# Patient Record
Sex: MALE | Race: WHITE | Employment: OTHER | RURAL
[De-identification: names, ages, dates, MRNs, and addresses within clinical notes are randomized per-mention and may not be internally consistent; named-entity substitution may affect disease eponyms.]

---

## 2017-05-02 ENCOUNTER — OFFICE VISIT (OUTPATIENT)
Dept: FAMILY MEDICINE CLINIC | Age: 62
End: 2017-05-02

## 2017-05-02 VITALS
HEART RATE: 83 BPM | TEMPERATURE: 98.2 F | DIASTOLIC BLOOD PRESSURE: 80 MMHG | RESPIRATION RATE: 16 BRPM | WEIGHT: 227.4 LBS | SYSTOLIC BLOOD PRESSURE: 120 MMHG | BODY MASS INDEX: 32.56 KG/M2 | OXYGEN SATURATION: 97 % | HEIGHT: 70 IN

## 2017-05-02 DIAGNOSIS — I25.83 CORONARY ARTERY DISEASE DUE TO LIPID RICH PLAQUE: Primary | ICD-10-CM

## 2017-05-02 DIAGNOSIS — E78.49 FAMILIAL HYPERLIPIDEMIA, HIGH LDL: ICD-10-CM

## 2017-05-02 DIAGNOSIS — I25.10 CORONARY ARTERY DISEASE DUE TO LIPID RICH PLAQUE: Primary | ICD-10-CM

## 2017-05-02 DIAGNOSIS — I10 ESSENTIAL HYPERTENSION: ICD-10-CM

## 2017-05-02 DIAGNOSIS — E78.00 HYPERCHOLESTEROLEMIA: ICD-10-CM

## 2017-05-02 PROBLEM — I21.4 NON-ST ELEVATION (NSTEMI) MYOCARDIAL INFARCTION (HCC): Status: ACTIVE | Noted: 2017-05-02

## 2017-05-02 RX ORDER — TRIAMCINOLONE ACETONIDE 1 MG/G
CREAM TOPICAL
Qty: 30 G | Refills: 1 | Status: SHIPPED | OUTPATIENT
Start: 2017-05-02 | End: 2018-09-28 | Stop reason: SDUPTHER

## 2017-05-02 RX ORDER — EZETIMIBE 10 MG/1
10 TABLET ORAL DAILY
Qty: 30 TAB | Refills: 6 | Status: SHIPPED | OUTPATIENT
Start: 2017-05-02 | End: 2018-04-13

## 2017-05-02 RX ORDER — FLUVASTATIN 20 MG/1
20 CAPSULE ORAL
Qty: 15 CAP | Refills: 3 | Status: SHIPPED | OUTPATIENT
Start: 2017-05-02 | End: 2017-06-19 | Stop reason: SINTOL

## 2017-05-02 NOTE — PROGRESS NOTES
Garrick Parks is a 64 y.o. male presenting for/with:    Labs (Fasting) and Leg Pain      HPI:  Familial Hyperlipidemia and CHD  On zetia 10mg qd. Sergey well. No myalgias, arthralgias, unusual weakness. Has hx of failing pravastatin 40mg every day in past due to cramps, Prev tried lovastatin, lipitor, zocor, crestor, had bad leg cramps. Never tried lescol, livalo. Lab Results   Component Value Date/Time    Cholesterol, total 582 05/13/2015 08:01 AM    HDL Cholesterol 38 05/13/2015 08:01 AM    LDL, calculated Comment 05/13/2015 08:01 AM    VLDL, calculated Comment 05/13/2015 08:01 AM    Triglyceride 1688 05/13/2015 08:01 AM       Lab Results   Component Value Date/Time    ALT (SGPT) 24 05/13/2015 08:01 AM    AST (SGOT) 28 05/13/2015 08:01 AM    Alk. phosphatase 70 05/13/2015 08:01 AM    Bilirubin, total 0.2 05/13/2015 08:01 AM       Hypertension. Blood pressures have been improving. Management at last visit included changing regimen to boost losartan to 100mg qd with cardio DR. Romero. Current regimen: angiotensin II receptor antagonist and beta-blocker. Symptoms include occ chest pain, dyspnea on exertion in stable angina pattern, same since last fall. Patient denies palpitations, peripheral edema. Lab review:   Lab Results   Component Value Date/Time    Sodium 137 05/13/2015 08:01 AM    Potassium 4.4 05/13/2015 08:01 AM    Chloride 103 05/13/2015 08:01 AM    CO2 17 05/13/2015 08:01 AM    Glucose 109 05/13/2015 08:01 AM    BUN 16 05/13/2015 08:01 AM    Creatinine 0.85 05/13/2015 08:01 AM    BUN/Creatinine ratio 19 05/13/2015 08:01 AM    GFR est  05/13/2015 08:01 AM    GFR est non-AA 95 05/13/2015 08:01 AM    Calcium 9.7 05/13/2015 08:01 AM     PMH, SH, Medications/Allergies: reviewed, on chart.     ROS:  Constitutional: No fever, chills or weight loss  Respiratory: No cough, SOB   CV: No chest pain or Palpitations    Visit Vitals    /80 (BP 1 Location: Left leg, BP Patient Position: Sitting)  Pulse 83    Temp 98.2 °F (36.8 °C) (Oral)    Resp 16    Ht 5' 10\" (1.778 m)    Wt 227 lb 6.4 oz (103.1 kg)    SpO2 97%    BMI 32.63 kg/m2     Wt Readings from Last 3 Encounters:   05/02/17 227 lb 6.4 oz (103.1 kg)   10/20/16 220 lb (99.8 kg)   08/30/16 223 lb (101.2 kg)     Physical Examination: General appearance - alert, well appearing, and in no distress  Mental status - alert, oriented to person, place, and time  Eyes - pupils equal and reactive, extraocular eye movements intact  ENT - bilateral external ears and nose normal. Normal lips  Neck - supple, no significant adenopathy, no thyromegaly or mass  Lymphatics - no palpable lymphadenopathy, no hepatosplenomegaly  Chest - clear to auscultation, no wheezes, rales or rhonchi, symmetric air entry  Heart - normal rate, regular rhythm, normal S1, S2, no murmurs, rubs, clicks or gallops  Extremities - peripheral pulses normal, no pedal edema, no clubbing or cyanosis. Tendon xanthomas palpated to wrist on L side. A/p:  Familial hyperlipidemia with CHD and past MI. Some sx of angina. Reminded to use his SLNG PRN for typical anginal sx. Consistent with heterozygous type given the high levels of total cholesterol and strong FHx of early coronary disease. Didn't adriana 5 statins, but hasn't tried fluvastatin or pitavastatin. Probably will need triple therapy anyway. Doing ok on zetia, con't that. Try adding fluvastatin 20mg every day and start on paperwork for repatha tx. Samples repatha given, 140mg Q2wk. F/U 2 mo/PRN.

## 2017-05-02 NOTE — PATIENT INSTRUCTIONS
We're working on starting a new shot for cholesterol and heart called repatha. Please drop by my office in [place] to get the shot.

## 2017-05-02 NOTE — MR AVS SNAPSHOT
Visit Information Date & Time Provider Department Dept. Phone Encounter #  
 5/2/2017  3:40 PM Debbie Gross, [address] [phone] [id number] Your Appointments 7/11/2017  7:30 AM  
ESTABLISHED PATIENT with Debbie Gross MD  
[address] ([address]) Appt Note: 2 mo F/U  
 [address] [facility] [address]  
[address] [place] [address] [facility] [address] Upcoming Health Maintenance Date Due Hepatitis C Screening 1955 DTaP/Tdap/Td series (1 - Tdap) 6/19/1976 FOBT Q 1 YEAR AGE 50-75 6/19/2005 ZOSTER VACCINE AGE 60> 6/19/2015 INFLUENZA AGE 9 TO ADULT 8/1/2017 Allergies as of 5/2/2017  Review Complete On: 5/2/2017 By: Debbie Gross MD  
  
 Severity Noted Reaction Type Reactions Pcn [Penicillins]  10/20/2016    Other (comments) Therapy ineffective Current Immunizations  Never Reviewed Name Date Influenza Vaccine (Quad) PF 10/20/2016  7:55 PM  
  
 Not reviewed this visit You Were Diagnosed With   
  
 Codes Comments Coronary artery disease due to lipid rich plaque    -  Primary ICD-10-CM: I25.10, I25.83 ICD-9-CM: 414.00, 414.3 Hypercholesterolemia     ICD-10-CM: E78.00 ICD-9-CM: 272.0 Essential hypertension     ICD-10-CM: I10 
ICD-9-CM: 401.9 Familial hyperlipidemia, high LDL     ICD-10-CM: E78.4 ICD-9-CM: 272.4 Vitals BP Pulse Temp Resp Height(growth percentile) Weight(growth percentile) 120/80 (BP 1 Location: Left leg, BP Patient Position: Sitting) 83 98.2 °F (36.8 °C) (Oral) 16 5' 10\" (1.778 m) 227 lb 6.4 oz (103.1 kg) SpO2 BMI Smoking Status 97% 32.63 kg/m2 Former Smoker BMI and BSA Data Body Mass Index Body Surface Area  
 32.63 kg/m 2 2.26 m 2 Preferred Pharmacy Pharmacy Name Phone [facility] [place] [address], [place] - [address] [phone] Your Updated Medication List  
  
   
This list is accurate as of: 17  4:53 PM.  Always use your most recent med list.  
  
  
  
  
 aspirin delayed-release 81 mg tablet Take  by mouth daily. esomeprazole 40 mg capsule Commonly known as:  NexIUM Take 1 Cap by mouth daily. evolocumab pen injection Commonly known as:  REPATHA SURECLICK  
1 ml SQ every 2 wk for cholesterol and heart  Indications: cholesterol and heart  
  
 ezetimibe 10 mg tablet Commonly known as:  Re[place] Floor Take 1 Tab by mouth daily. fluvastatin 20 mg capsule Commonly known as:  LESCOL Take 1 Cap by mouth nightly. Indications: trial RX, can get bigger bottle if tolerates ok. losartan 50 mg tablet Commonly known as:  COZAAR Take  by mouth nightly. metoprolol succinate 25 mg XL tablet Commonly known as:  TOPROL-XL  
TAKE ONE TABLET BY MOUTH EVERY DAY  
  
 nitroglycerin 0.4 mg SL tablet Commonly known as:  NITROSTAT  
1 Tab by SubLINGual route every five (5) minutes as needed for Chest Pain.  
  
 triamcinolone acetonide 0.1 % topical cream  
Commonly known as:  KENALOG  
use thin layer bid prn 10 -14 days. on face Prescriptions Printed Refills  
 evolocumab (REPATHA SURECLICK) pen injection 11 Si ml SQ every 2 wk for cholesterol and heart  Indications: cholesterol and heart Class: Print Prescriptions Sent to Pharmacy Refills  
 fluvastatin (LESCOL) 20 mg capsule 3 Sig: Take 1 Cap by mouth nightly. Indications: trial RX, can get bigger bottle if tolerates ok. Class: Normal  
 Pharmacy: [address] [facility], [address] [place] [address] Alejandro Mena Ph #: [phone] Route: Oral  
 ezetimibe (ZETIA) 10 mg tablet 6 Sig: Take 1 Tab by mouth daily.   
 Class: Normal  
 Pharmacy: [address] [facility], [address] [facility] [place] Ph #: [phone] Route: Oral  
 triamcinolone acetonide (KENALOG) 0.1 % topical cream 1 Sig: use thin layer bid prn 10 -14 days. on face Class: Normal  
 Pharmacy: [address], [address] Ph #: [phone] Introducing [facility]! Catherine Araujo introduces [employer] patient portal. Now you can access parts of your medical record, email your doctor's office, and request medication refills online. 1. In your internet browser, go to [email or web address] 2. Click on the First Time User? Click Here link in the Sign In box. You will see the New Member Sign Up page. 3. Enter your LeCab Access Code exactly as it appears below. You will not need to use this code after youve completed the sign-up process. If you do not sign up before the expiration date, you must request a new code. · [employer] Access Code: [email or web address] Expires: 7/31/2017  4:53 PM 
 
4. Enter the last four digits of your Social Security Number (xxxx) and Date of Birth (mm/dd/yyyy) as indicated and click Submit. You will be taken to the next sign-up page. 5. Create a LeCab ID. This will be your LeCab login ID and cannot be changed, so think of one that is secure and easy to remember. 6. Create a LeCab password. You can change your password at any time. 7. Enter your Password Reset Question and Answer. This can be used at a later time if you forget your password. 8. Enter your e-mail address. You will receive e-mail notification when new information is available in [address] 9. Click Sign Up. You can now view and download portions of your medical record. 10. Click the Download Summary menu link to download a portable copy of your medical information. If you have questions, please visit the Frequently Asked Questions section of the Le[employer] website. Remember, LeC[employer] is NOT to be used for urgent needs. For medical emergencies, dial 911. Now available from your iPhone and Android! Please provide this summary of care documentation to your next provider. Your primary care clinician is listed as Jethro Camarena. If you have any questions after today's visit, please call [phone].

## 2017-05-02 NOTE — Clinical Note
I read your last note and I agree that he has indication for PSK-9. I'm pretty sure repatha is the preferred PSK-9 on anthem, I'll have my nurses work on that. I also tried him on lescol 20mg, I figured we should try _all_ of the statins too, he'll probably benefit from triple therapy if we can manage it.  Sal

## 2017-05-08 ENCOUNTER — TELEPHONE (OUTPATIENT)
Dept: FAMILY MEDICINE CLINIC | Age: 62
End: 2017-05-08

## 2017-05-08 NOTE — TELEPHONE ENCOUNTER
Patient picked up the vials at the [facility] office but needs instruction on how to administer.

## 2017-05-08 NOTE — TELEPHONE ENCOUNTER
Spoke drug rep for product patient will need to call to pre register for a training session. [phone]. I have completed all forms and asked Dr Garth Flores to sign. Zenaida Mullins has been advised.

## 2017-05-16 ENCOUNTER — TELEPHONE (OUTPATIENT)
Dept: FAMILY MEDICINE CLINIC | Age: 62
End: 2017-05-16

## 2017-05-24 ENCOUNTER — TELEPHONE (OUTPATIENT)
Dept: FAMILY MEDICINE CLINIC | Age: 62
End: 2017-05-24

## 2017-05-25 NOTE — TELEPHONE ENCOUNTER
Spoke with Daphnie Moon. Patient is having problem with pain and tingling in feet. I have advised Daphnie Moon orthopedic specialist would probably be best.  She stated patient stated has seen Dr Zachery Leone in past will give his office a call.

## 2017-05-30 ENCOUNTER — TELEPHONE (OUTPATIENT)
Dept: FAMILY MEDICINE CLINIC | Age: 62
End: 2017-05-30

## 2017-05-30 NOTE — TELEPHONE ENCOUNTER
Patient wife called concerning [address],was denied per insurance company. Dr Louann Becerra is aware spoke to patient sent new(Fluvastatin  20 mg ) prescription to Pharmacy .

## 2017-05-30 NOTE — TELEPHONE ENCOUNTER
I only wanted to see him if he was going to be on the repatha, to troubleshoot.  Since he's not, we can just see him in August.

## 2017-05-30 NOTE — TELEPHONE ENCOUNTER
He just needs to stay on the fluvastatin + zetia until 3 months pass, and we'll recheck the cholesterol then, and go from there. May be able to get it covered later if cholesterol still really high.

## 2017-05-30 NOTE — TELEPHONE ENCOUNTER
Pt was denied a medication repatha and wants to know what dr Cornell Clark wants him to do now that is he not getting that med please advise

## 2017-05-30 NOTE — TELEPHONE ENCOUNTER
Patient's wife has been advised of dr Agnes Tran instructions. Per wife has appointment on 07-11-17. Does he need to keep that appointment ?   Per Note needs to be on meds for 3 month so that would be in August.

## 2017-06-16 DIAGNOSIS — I25.83 CORONARY ARTERY DISEASE DUE TO LIPID RICH PLAQUE: Primary | ICD-10-CM

## 2017-06-16 DIAGNOSIS — I25.10 CORONARY ARTERY DISEASE DUE TO LIPID RICH PLAQUE: Primary | ICD-10-CM

## 2017-06-16 NOTE — TELEPHONE ENCOUNTER
Advised wife that provider was out of office today but would send note to him in [facility] practice.

## 2017-06-16 NOTE — TELEPHONE ENCOUNTER
Spoke with patient's wife Abran. They are asking for the name of cardiologist to see for his situation with cholesterol. He has seen Dr Artur Odom in past,but would like a second opinion and wonders who you would recommend ( they were very pleased with the care that was given them by you Dr Shantell Garcia ) and value your opinion on who to see. Saw Dr Basilio Fothergill but was not comfortable while in his care. She also wanted me to advised you patient mentioned today that Lescol is causing muscle cramp in legs.

## 2017-06-16 NOTE — TELEPHONE ENCOUNTER
If the cramps aren't particularly bothersome, I'd keep up the the fluvastatin/lescol. Otherwise, I can try ordering pitavastatin/livalo, it's more gentle than fluvastatin. For cardiology, I recommend Dr. Angely Ely in [place], he's sharp and personable.

## 2017-06-19 NOTE — TELEPHONE ENCOUNTER
Spoke with wife Abran. Advised would be best to see Dr Marcelle Walden per Dr Charmayne Pore recommendation. office phone number given to patient she stated she would give office a call to set up own appointment.

## 2017-06-19 NOTE — TELEPHONE ENCOUNTER
Already been seen by Dr Dillon Garcia. 05/02/17  Which is out office this week. Sol Christine has agreed to call in med.

## 2017-06-19 NOTE — TELEPHONE ENCOUNTER
DR Meliton Llanes : would you mind calling in medication Dr Margarita Bryan suggested Pitavastatin to [facility] in [place]. He did not mention strength so I could not call it in Thanks.

## 2017-08-08 ENCOUNTER — OFFICE VISIT (OUTPATIENT)
Dept: FAMILY MEDICINE CLINIC | Age: 62
End: 2017-08-08

## 2017-08-08 VITALS
RESPIRATION RATE: 16 BRPM | HEART RATE: 79 BPM | TEMPERATURE: 97.5 F | WEIGHT: 224 LBS | OXYGEN SATURATION: 98 % | SYSTOLIC BLOOD PRESSURE: 120 MMHG | BODY MASS INDEX: 32.07 KG/M2 | DIASTOLIC BLOOD PRESSURE: 80 MMHG | HEIGHT: 70 IN

## 2017-08-08 DIAGNOSIS — I25.83 CORONARY ARTERY DISEASE DUE TO LIPID RICH PLAQUE: Primary | ICD-10-CM

## 2017-08-08 DIAGNOSIS — I10 ESSENTIAL HYPERTENSION: ICD-10-CM

## 2017-08-08 DIAGNOSIS — I25.10 CORONARY ARTERY DISEASE DUE TO LIPID RICH PLAQUE: Primary | ICD-10-CM

## 2017-08-08 DIAGNOSIS — E78.49 FAMILIAL HYPERLIPIDEMIA, HIGH LDL: ICD-10-CM

## 2017-08-08 RX ORDER — METOPROLOL SUCCINATE 50 MG/1
50 TABLET, EXTENDED RELEASE ORAL DAILY
Qty: 90 TAB | Refills: 3 | Status: SHIPPED | OUTPATIENT
Start: 2017-08-08 | End: 2018-04-30 | Stop reason: ALTCHOICE

## 2017-08-08 NOTE — PROGRESS NOTES
Gloria Dorsey is a 58 y.o. male presenting for/with:    Cholesterol Problem and Follow-up    HPI:  Familial Hyperlipidemia and CHD  On zetia 10mg every day and fenofibrate. Sergey well. No myalgias, arthralgias, unusual weakness. Has hx of failing pravastatin 40mg, lovastatin, lipitor, zocor, crestor, fluvastatin. Had bad leg cramps. Ins denied repatha last visit, said had to try fluvastatin first. Had spell of angina in June, was wrestling with a large dog, felt chest pain. Went to [facility] ER, had eval, told no heart damage. OIf note, had dramatic improvement in his lipid levels though, as below:    Cholesterol Total  Triglycerides  High Density Lipoprotein  Low Density Lipoprotein (calculation)Cholesterol/HDL Ratio  (From Care everywhere)  6/17 137 585 (H)   389 (H) >1000 (H)   27 (L) 29 (L)   32 see note   5.1 (H) 20.2 (H)            Lab Results   Component Value Date/Time    Cholesterol, total 582 05/13/2015 08:01 AM    HDL Cholesterol 38 05/13/2015 08:01 AM    LDL, calculated Comment 05/13/2015 08:01 AM    VLDL, calculated Comment 05/13/2015 08:01 AM    Triglyceride 1688 05/13/2015 08:01 AM       Lab Results   Component Value Date/Time    ALT (SGPT) 24 05/13/2015 08:01 AM    AST (SGOT) 28 05/13/2015 08:01 AM    Alk. phosphatase 70 05/13/2015 08:01 AM    Bilirubin, total 0.2 05/13/2015 08:01 AM       Hypertension. Blood pressures have been improving. Management at last visit included con't current tx. Current regimen: angiotensin II receptor antagonist and beta-blocker. Symptoms include occ chest pain, dyspnea on exertion in stable angina pattern, same since last fall. Patient denies palpitations, peripheral edema.   Lab review:   Lab Results   Component Value Date/Time    Sodium 137 05/13/2015 08:01 AM    Potassium 4.4 05/13/2015 08:01 AM    Chloride 103 05/13/2015 08:01 AM    CO2 17 05/13/2015 08:01 AM    Glucose 109 05/13/2015 08:01 AM    BUN 16 05/13/2015 08:01 AM    Creatinine 0.85 05/13/2015 08:01 AM BUN/Creatinine ratio 19 05/13/2015 08:01 AM    GFR est  05/13/2015 08:01 AM    GFR est non-AA 95 05/13/2015 08:01 AM    Calcium 9.7 05/13/2015 08:01 AM     PMH, SH, Medications/Allergies: reviewed, on chart. ROS:  Constitutional: No fever, chills or weight loss  Respiratory: No cough, SOB   CV: No chest pain or Palpitations    Visit Vitals    /80    Pulse 79    Temp 97.5 °F (36.4 °C) (Oral)    Resp 16    Ht 5' 10\" (1.778 m)    Wt 224 lb (101.6 kg)    SpO2 98%    BMI 32.14 kg/m2     Wt Readings from Last 3 Encounters:   08/08/17 224 lb (101.6 kg)   05/02/17 227 lb 6.4 oz (103.1 kg)   10/20/16 220 lb (99.8 kg)     BP Readings from Last 3 Encounters:   08/08/17 120/80   05/02/17 120/80   10/20/16 156/83     Physical Examination: General appearance - alert, well appearing, and in no distress  Mental status - alert, oriented to person, place, and time  Eyes - pupils equal and reactive, extraocular eye movements intact  ENT - bilateral external ears and nose normal. Normal lips  Neck - supple, no significant adenopathy, no thyromegaly or mass  Lymphatics - no palpable lymphadenopathy, no hepatosplenomegaly  Chest - clear to auscultation, no wheezes, rales or rhonchi, symmetric air entry  Heart - normal rate, regular rhythm, normal S1, S2, no murmurs, rubs, clicks or gallops  Extremities - peripheral pulses normal, no pedal edema, no clubbing or cyanosis. Tendon xanthomas palpated to wrist on L side. A/p:  Familial hyperlipidemia with CHD and past MI. Had sx of angina. Reminded to use his SLNG PRN for typical anginal sx. Consistent with heterozygous type given the high levels of total cholesterol and strong FHx of early coronary disease. Didn't adriana 6 statins, never got pitavastatin. Doing ok on zetia, con't that. May need to try again to get repatha tx with cardiology. HTN  good control on boosted dose metoprolol. con't. F/U 2 mo/PRN.

## 2017-08-08 NOTE — MR AVS SNAPSHOT
Visit Information Date & Time Provider Department Dept. Phone Encounter #  
 8/8/2017  8:10 AM Sera RamanDave 72 [phone] [id number] Follow-up Instructions Return in about 3 months (around 11/8/2017). Follow-up and Disposition History Upcoming Health Maintenance Date Due Hepatitis C Screening 1955 DTaP/Tdap/Td series (1 - Tdap) 6/19/1976 FOBT Q 1 YEAR AGE 50-75 6/19/2005 ZOSTER VACCINE AGE 60> 4/19/2015 INFLUENZA AGE 9 TO ADULT 8/1/2017 Allergies as of 8/8/2017  Review Complete On: 8/8/2017 By: Sera Raman MD  
  
 Severity Noted Reaction Type Reactions Pcn [Penicillins]  10/20/2016    Other (comments) Therapy ineffective Current Immunizations  Never Reviewed Name Date Influenza Vaccine (Quad) PF 10/20/2016  7:55 PM  
  
 Not reviewed this visit You Were Diagnosed With   
  
 Codes Comments Coronary artery disease due to lipid rich plaque    -  Primary ICD-10-CM: I25.10, I25.83 ICD-9-CM: 414.00, 414.3 Familial hyperlipidemia, high LDL     ICD-10-CM: E78.4 ICD-9-CM: 272.4 Essential hypertension     ICD-10-CM: I10 
ICD-9-CM: 401.9 Vitals BP Pulse Temp Resp Height(growth percentile) Weight(growth percentile) 120/80 79 97.5 °F (36.4 °C) (Oral) 16 5' 10\" (1.778 m) 224 lb (101.6 kg) SpO2 BMI Smoking Status 98% 32.14 kg/m2 Former Smoker BMI and BSA Data Body Mass Index Body Surface Area  
 32.14 kg/m 2 2.24 m 2 Preferred Pharmacy Pharmacy Name Phone [facility] [place] [address] [place] - [address] [phone] Your Updated Medication List  
  
   
This list is accurate as of: 8/8/17  8:50 AM.  Always use your most recent med list.  
  
  
  
  
 aspirin delayed-release 81 mg tablet Take  by mouth daily. esomeprazole 40 mg capsule Commonly known as:  NexIUM Take 1 Cap by mouth daily. evolocumab pen injection Commonly known as:  REPATHA SURECLICK  
1 ml SQ every 2 wk for cholesterol and heart  Indications: cholesterol and heart  
  
 ezetimibe 10 mg tablet Commonly known as:  [place] Take 1 Tab by mouth daily. losartan 50 mg tablet Commonly known as:  COZAAR Take  by mouth nightly. metoprolol succinate 50 mg XL tablet Commonly known as:  TOPROL-XL Take 1 Tab by mouth daily. Indications: heart and pressure, from [facility]  
  
 nitroglycerin 0.4 mg SL tablet Commonly known as:  NITROSTAT  
1 Tab by SubLINGual route every five (5) minutes as needed for Chest Pain.  
  
 pitavastatin calcium 1 mg Tab tablet Commonly known as:  LIVALO Take 1 Tab by mouth daily. triamcinolone acetonide 0.1 % topical cream  
Commonly known as:  KENALOG  
use thin layer bid prn 10 -14 days. on face Prescriptions Sent to Pharmacy Refills  
 pitavastatin calcium (LIVALO) 1 mg tab tablet 3 Sig: Take 1 Tab by mouth daily. Class: Normal  
 Pharmacy: [address] [place] [address] Ph #: [phone] Route: Oral  
 metoprolol succinate (TOPROL-XL) 50 mg XL tablet 3 Sig: Take 1 Tab by mouth daily. Indications: heart and pressure, from [facility] Class: Normal  
 Pharmacy: [address] [place] [address] Ph #: [phone] Route: Oral  
  
Follow-up Instructions Return in about 3 months (around 11/8/2017). To-Do List   
 08/08/2017 Lab:  CBC WITH AUTOMATED DIFF   
  
 08/08/2017 Procedures:  COLLECTION VENOUS BLOOD,VENIPUNCTURE   
  
 08/08/2017 Lab:  LIPID PANEL   
  
 08/08/2017 Lab:  METABOLIC PANEL, COMPREHENSIVE   
  
 08/08/2017 Lab:  TSH RFX ON ABNORMAL TO FREE T4 Introducing [facility]!    
 [facility] introduces [employer] patient portal. Now you can access parts of your medical record, email your doctor's office, and request medication refills online. 1. In your internet browser, go to [email or web address] [place] [email or web address] [place] 2. Click on the First Time User? Click Here link in the Sign In box. You will see the New Member Sign Up page. 3. Enter your prettysecrets Access Code exactly as it appears below. You will not need to use this code after youve completed the sign-up process. If you do not sign up before the expiration date, you must request a new code. · prettysecrets Access Code: [email or web address] Expires: 11/6/2017  8:50 AM 
 
4. Enter the last four digits of your Social Security Number (xxxx) and Date of Birth (mm/dd/yyyy) as indicated and click Submit. You will be taken to the next sign-up page. 5. Create a prettysecrets ID. This will be your prettysecrets login ID and cannot be changed, so think of one that is secure and easy to remember. 6. Create a prettysecrets password. You can change your password at any time. 7. Enter your Password Reset Question and Answer. This can be used at a later time if you forget your password. 8. Enter your e-mail address. You will receive e-mail notification when new information is available in [address] 9. Click Sign Up. You can now view and download portions of your medical record. 10. Click the Download Summary menu link to download a portable copy of your medical information. If you have questions, please visit the Frequently Asked Questions section of the prettysecrets website. Remember, prettysecrets is NOT to be used for urgent needs. For medical emergencies, dial 911. Now available from your iPhone and Android! Please provide this summary of care documentation to your next provider. Your primary care clinician is listed as Melissa Leon. If you have any questions after today's visit, please call [phone].

## 2017-08-09 ENCOUNTER — TELEPHONE (OUTPATIENT)
Dept: FAMILY MEDICINE CLINIC | Age: 62
End: 2017-08-09

## 2017-08-09 NOTE — TELEPHONE ENCOUNTER
Wife advised pharmacy did receive Rx but insurance is not covering med. I have already started prior authorization for it and waiting for a decision which may take 48-72 hours or more.

## 2017-08-09 NOTE — TELEPHONE ENCOUNTER
Mrs Willam Britt says Shantell Reeves did not get his Pitavastatin yesterday. Please call pharmacy and get this straight so it can be picked up today.

## 2017-08-11 ENCOUNTER — TELEPHONE (OUTPATIENT)
Dept: FAMILY MEDICINE CLINIC | Age: 62
End: 2017-08-11

## 2017-08-11 NOTE — TELEPHONE ENCOUNTER
Checked online. Insurance company has denied coverage for Livalo (pitavastatin ). Would you like me to start an appeal ?. Wife advised that coverage was denied. Wife asking about having blood drawn to check the enzymes in blood for a condition that makes patient intolerant to statins. .  Patient sees Dr Edda Mayes for cardio.

## 2017-08-11 NOTE — TELEPHONE ENCOUNTER
Letter typed and faxed to [facility] along with progress note from Dr. Barrington Silva. Copy of letter mailed to patient. Fax confirmation scanned.

## 2017-08-15 ENCOUNTER — TELEPHONE (OUTPATIENT)
Dept: FAMILY MEDICINE CLINIC | Age: 62
End: 2017-08-15

## 2017-09-19 ENCOUNTER — TELEPHONE (OUTPATIENT)
Dept: FAMILY MEDICINE CLINIC | Age: 62
End: 2017-09-19

## 2017-09-19 NOTE — TELEPHONE ENCOUNTER
Dr Alma Ballard:  Mrs Henna Muniz called to say Mr Henna Muniz could not take the Zetia. Dr Padmini Armendariz will write a letter to the insurance company about the [address] [place] [address]. Any suggestions about what he should do?   Edson Lombardo [phone]

## 2017-11-13 ENCOUNTER — TELEPHONE (OUTPATIENT)
Dept: FAMILY MEDICINE CLINIC | Age: 62
End: 2017-11-13

## 2017-11-13 NOTE — TELEPHONE ENCOUNTER
Wife called has questions about his statins,I offered to help but has requested to speak to you cyn,no hurry can wait untiltomorrow.

## 2017-11-24 DIAGNOSIS — I25.83 CORONARY ARTERY DISEASE DUE TO LIPID RICH PLAQUE: Primary | ICD-10-CM

## 2017-11-24 DIAGNOSIS — E78.49 FAMILIAL HYPERLIPIDEMIA, HIGH LDL: ICD-10-CM

## 2017-11-24 DIAGNOSIS — I10 ESSENTIAL HYPERTENSION: ICD-10-CM

## 2017-11-24 DIAGNOSIS — M62.82 NON-TRAUMATIC RHABDOMYOLYSIS: ICD-10-CM

## 2017-11-24 DIAGNOSIS — I25.10 CORONARY ARTERY DISEASE DUE TO LIPID RICH PLAQUE: Primary | ICD-10-CM

## 2018-04-13 ENCOUNTER — OFFICE VISIT (OUTPATIENT)
Dept: FAMILY MEDICINE CLINIC | Age: 63
End: 2018-04-13

## 2018-04-13 VITALS
TEMPERATURE: 98.1 F | WEIGHT: 232 LBS | DIASTOLIC BLOOD PRESSURE: 90 MMHG | RESPIRATION RATE: 22 BRPM | BODY MASS INDEX: 33.21 KG/M2 | SYSTOLIC BLOOD PRESSURE: 145 MMHG | HEIGHT: 70 IN | HEART RATE: 94 BPM | OXYGEN SATURATION: 98 %

## 2018-04-13 DIAGNOSIS — M25.475 SWELLING OF FIRST METATARSOPHALANGEAL (MTP) JOINT OF LEFT FOOT: Primary | ICD-10-CM

## 2018-04-13 RX ORDER — PREDNISONE 10 MG/1
TABLET ORAL
Qty: 21 TAB | Refills: 0 | Status: SHIPPED | OUTPATIENT
Start: 2018-04-13 | End: 2018-09-28 | Stop reason: ALTCHOICE

## 2018-04-13 NOTE — MR AVS SNAPSHOT
[facility] 
 
 
 [address] [address] [phone] Patient: Pauly Zayas MRN: [id number] VAU:[birth year]80 Visit Information Date & Time Provider Department Dept. Phone Encounter #  
 4/13/2018  1:20 PM Nigel Hutchins NP [address] [id number] Your Appointments 4/30/2018  3:40 PM  
New Patient with Christine Sales MD  
[facility] ([address]) Appt Note: dx second opinion [address] [place]  
[address] [address] Upcoming Health Maintenance Date Due Hepatitis C Screening 1955 DTaP/Tdap/Td series (1 - Tdap) 6/19/1976 FOBT Q 1 YEAR AGE 50-75 6/19/2005 ZOSTER VACCINE AGE 60> 4/19/2015 Influenza Age 5 to Adult 8/1/2017 Allergies as of 4/13/2018  Review Complete On: 4/13/2018 By: Nigel Hutchins NP Severity Noted Reaction Type Reactions Pcn [Penicillins]  10/20/2016    Other (comments) Therapy ineffective Current Immunizations  Never Reviewed Name Date Influenza Vaccine (Quad) PF 10/20/2016  7:55 PM  
  
 Not reviewed this visit You Were Diagnosed With   
  
 Codes Comments Swelling of first metatarsophalangeal (MTP) joint of left foot    -  Primary ICD-10-CM: M25.475 ICD-9-CM: 719.07 Vitals BP Pulse Temp Resp Height(growth percentile) Weight(growth percentile) 145/90 (BP 1 Location: Right arm, BP Patient Position: Sitting) 94 98.1 °F (36.7 °C) (Oral) 22 5' 10\" (1.778 m) 232 lb (105.2 kg) SpO2 BMI Smoking Status 98% 33.29 kg/m2 Former Smoker Vitals History BMI and BSA Data Body Mass Index Body Surface Area  
 33.29 kg/m 2 2.28 m 2 Preferred Pharmacy Pharmacy Name Phone [address] [phone] Your Updated Medication List  
  
   
This list is accurate as of 4/13/18  2:00 PM.  Always use your most recent med list.  
  
  
  
  
 aspirin delayed-release 81 mg tablet Take  by mouth daily. esomeprazole 40 mg capsule Commonly known as:  NexIUM Take 1 Cap by mouth daily. losartan 50 mg tablet Commonly known as:  COZAAR Take  by mouth nightly. metoprolol succinate 50 mg XL tablet Commonly known as:  TOPROL-XL Take 1 Tab by mouth daily. Indications: heart and pressure, from [place]  
  
 nitroglycerin 0.4 mg SL tablet Commonly known as:  NITROSTAT  
1 Tab by SubLINGual route every five (5) minutes as needed for Chest Pain.  
  
 pitavastatin calcium 1 mg Tab tablet Commonly known as:  LIVALO Take 1 Tab by mouth daily. predniSONE 10 mg dose pack Commonly known as:  STERAPRED DS See administration instruction per 10mg dose pack  
  
 triamcinolone acetonide 0.1 % topical cream  
Commonly known as:  KENALOG  
use thin layer bid prn 10 -14 days. on face Prescriptions Sent to Pharmacy Refills  
 predniSONE (STERAPRED DS) 10 mg dose pack 0 Sig: See administration instruction per 10mg dose pack Class: Normal  
 Pharmacy: [facility] [address] Ph #: [phone] We Performed the Following C REACTIVE PROTEIN, QT [81827 CPT(R)] CBC WITH AUTOMATED DIFF [54394 CPT(R)] METABOLIC PANEL, COMPREHENSIVE [86199 CPT(R)] SED RATE (ESR) [id number] CPT(R)] URIC ACID [id number] CPT(R)] Introducing [facility]! Maris Ceja introduces "[employer] patient portal. Now you can access parts of your medical record, email your doctor's office, and request medication refills online. 1. In your internet browser, go to [email or web address]/[email or web address] 2. Click on the First Time User? Click Here link in the Sign In box. You will see the New Member Sign Up page. 3. Enter your [employer] Access Code exactly as it appears below. You will not need to use this code after youve completed the sign-up process. If you do not sign up before the expiration date, you must request a new code. · [employer] Access Code: [email or web address] Expires: 7/12/2018  2:00 PM 
 
4. Enter the last four digits of your Social Security Number (xxxx) and Date of Birth (mm/dd/yyyy) as indicated and click Submit. You will be taken to the next sign-up page. 5. Create a [employer]ell ID. This will be your [employer]ell login ID and cannot be changed, so think of one that is secure and easy to remember. 6. Create a [employer] password. You can change your password at any time. 7. Enter your Password Reset Question and Answer. This can be used at a later time if you forget your password. 8. Enter your e-mail address. You will receive e-mail notification when new information is available in [address] 9. Click Sign Up. You can now view and download portions of your medical record. 10. Click the Download Summary menu link to download a portable copy of your medical information. If you have questions, please visit the Frequently Asked Questions section of the [employer] website. Remember, [employer] is NOT to be used for urgent needs. For medical emergencies, dial 911. Now available from your iPhone and Android! Please provide this summary of care documentation to your next provider. Your primary care clinician is listed as Elana Carty. If you have any questions after today's visit, please call [phone].

## 2018-04-13 NOTE — PROGRESS NOTES
Chief Complaint   Patient presents with    Foot Swelling     left foot swelling since last saturday. HPI:      Lj Emery is a 58 y.o. male. New Issues:  He has had left foot swelling near his big toe since Saturday. Denies injury. Has never had gout before. No wounds on foot. Has a history of septic bursitis in the left leg 10 years ago from an unknown cause. Denies fever. Denies ETOH use or recent shellfish in diet. Allergies   Allergen Reactions    Pcn [Penicillins] Other (comments)     Therapy ineffective         Current Outpatient Prescriptions   Medication Sig    pitavastatin calcium (LIVALO) 1 mg tab tablet Take 1 Tab by mouth daily.  metoprolol succinate (TOPROL-XL) 50 mg XL tablet Take 1 Tab by mouth daily. Indications: heart and pressure, from [place]    triamcinolone acetonide (KENALOG) 0.1 % topical cream use thin layer bid prn 10 -14 days. on face    aspirin delayed-release 81 mg tablet Take  by mouth daily.  esomeprazole (NEXIUM) 40 mg capsule Take 1 Cap by mouth daily. (Patient taking differently: Take 40 mg by mouth as needed.)    losartan (COZAAR) 50 mg tablet Take  by mouth nightly.  nitroglycerin (NITROSTAT) 0.4 mg SL tablet 1 Tab by SubLINGual route every five (5) minutes as needed for Chest Pain. No current facility-administered medications for this visit.         Past Medical History:   Diagnosis Date    CAD (coronary artery disease)     GERD (gastroesophageal reflux disease)     Hypercholesterolemia     Hypertension     Ill-defined condition     Deisi       Past Surgical History:   Procedure Laterality Date    HX COLONOSCOPY  2012    est, neg    HX HEART CATHETERIZATION  2012    X2, no stents       Social History     Social History    Marital status:      Spouse name: N/A    Number of children: N/A    Years of education: N/A     Social History Main Topics    Smoking status: Former Smoker    Smokeless tobacco: Never Used    Alcohol use Yes    Drug use: No    Sexual activity: Not Asked     Other Topics Concern    None     Social History Narrative       Family History   Problem Relation Age of Onset    Heart Disease Father     Hypertension Father     Elevated Lipids Father     Hypertension Mother     Cancer Maternal Grandfather      Throat    Liver Disease Paternal Grandmother     Heart Disease Paternal Grandmother     Heart Disease Paternal Grandfather        Above history reviewed. ROS:  Denies fever, chills, cough, chest pain, SOB,  nausea, vomiting, or diarrhea. Denies wt loss, wt gain, hemoptysis, hematochezia or melena. Physical Examination:    /90 (BP 1 Location: Right arm, BP Patient Position: Sitting)  Pulse 94  Temp 98.1 °F (36.7 °C) (Oral)   Resp 22  Ht 5' 10\" (1.778 m)  Wt 232 lb (105.2 kg)  SpO2 98%  BMI 33.29 kg/m2    General: Alert and Ox3, Fluent speech  Neck:  Supple, no adenopathy, JVD, mass or bruit  Chest:  Clear to Ausculation, without wheezes, rales, rubs or ronchi  Cardiac: RRR  Extremities:  Right 1st MTP with erythema, swelling and tenderness. Neurologic:  Ambulatory without assist, CN 2-12 grossly intact. Moves all extremities. Lymphadenopathy: no cervical or supraclavicular nodes    ASSESSMENT AND PLAN:     1. Swelling of first metatarsophalangeal (MTP) joint of left foot  Gout vs cellulitis  - URIC ACID  - CBC WITH AUTOMATED DIFF  - METABOLIC PANEL, COMPREHENSIVE  - SED RATE (ESR)  - C REACTIVE PROTEIN, QT  - predniSONE (STERAPRED DS) 10 mg dose pack; See administration instruction per 10mg dose pack  Dispense: 21 Tab;  Refill: 0     RTC PRN    Shun Arias NP

## 2018-04-14 LAB
ALBUMIN SERPL-MCNC: 4.4 G/DL (ref 3.6–4.8)
ALBUMIN/GLOB SERPL: 1.6 {RATIO} (ref 1.2–2.2)
ALP SERPL-CCNC: 80 IU/L (ref 39–117)
ALT SERPL-CCNC: 22 IU/L (ref 0–44)
AST SERPL-CCNC: 20 IU/L (ref 0–40)
BASOPHILS # BLD AUTO: 0 X10E3/UL (ref 0–0.2)
BASOPHILS NFR BLD AUTO: 0 %
BILIRUB SERPL-MCNC: 0.3 MG/DL (ref 0–1.2)
BUN SERPL-MCNC: 15 MG/DL (ref 8–27)
BUN/CREAT SERPL: 16 (ref 10–24)
CALCIUM SERPL-MCNC: 9.1 MG/DL (ref 8.6–10.2)
CHLORIDE SERPL-SCNC: 101 MMOL/L (ref 96–106)
CO2 SERPL-SCNC: 20 MMOL/L (ref 18–29)
CREAT SERPL-MCNC: 0.91 MG/DL (ref 0.76–1.27)
CRP SERPL-MCNC: 21 MG/L (ref 0–4.9)
EOSINOPHIL # BLD AUTO: 0.2 X10E3/UL (ref 0–0.4)
EOSINOPHIL NFR BLD AUTO: 2 %
ERYTHROCYTE [DISTWIDTH] IN BLOOD BY AUTOMATED COUNT: 14.7 % (ref 12.3–15.4)
ERYTHROCYTE [SEDIMENTATION RATE] IN BLOOD BY WESTERGREN METHOD: 33 MM/HR (ref 0–30)
GFR SERPLBLD CREATININE-BSD FMLA CKD-EPI: 104 ML/MIN/1.73
GFR SERPLBLD CREATININE-BSD FMLA CKD-EPI: 90 ML/MIN/1.73
GLOBULIN SER CALC-MCNC: 2.7 G/DL (ref 1.5–4.5)
GLUCOSE SERPL-MCNC: 108 MG/DL (ref 65–99)
HCT VFR BLD AUTO: 38.4 % (ref 37.5–51)
HGB BLD-MCNC: 13.4 G/DL (ref 13–17.7)
IMM GRANULOCYTES # BLD: 0 X10E3/UL (ref 0–0.1)
IMM GRANULOCYTES NFR BLD: 0 %
LYMPHOCYTES # BLD AUTO: 2.2 X10E3/UL (ref 0.7–3.1)
LYMPHOCYTES NFR BLD AUTO: 22 %
MCH RBC QN AUTO: 29.6 PG (ref 26.6–33)
MCHC RBC AUTO-ENTMCNC: 34.9 G/DL (ref 31.5–35.7)
MCV RBC AUTO: 85 FL (ref 79–97)
MONOCYTES # BLD AUTO: 0.7 X10E3/UL (ref 0.1–0.9)
MONOCYTES NFR BLD AUTO: 7 %
NEUTROPHILS # BLD AUTO: 6.8 X10E3/UL (ref 1.4–7)
NEUTROPHILS NFR BLD AUTO: 69 %
PLATELET # BLD AUTO: 259 X10E3/UL (ref 150–379)
POTASSIUM SERPL-SCNC: 3.9 MMOL/L (ref 3.5–5.2)
PROT SERPL-MCNC: 7.1 G/DL (ref 6–8.5)
RBC # BLD AUTO: 4.53 X10E6/UL (ref 4.14–5.8)
SODIUM SERPL-SCNC: 139 MMOL/L (ref 134–144)
URATE SERPL-MCNC: 6.9 MG/DL (ref 3.7–8.6)
WBC # BLD AUTO: 9.9 X10E3/UL (ref 3.4–10.8)

## 2018-04-16 ENCOUNTER — TELEPHONE (OUTPATIENT)
Dept: FAMILY MEDICINE CLINIC | Age: 63
End: 2018-04-16

## 2018-04-16 NOTE — TELEPHONE ENCOUNTER
----- Message from Jorden Bazan NP sent at 4/16/2018  9:05 AM EDT -----  Will you ask how he is feeling? Uric acid level is only mildly out of goal range for gout. However, your inflammatory factors are elevated which could indicate RA. We should check a CCP and DAVIS to work this up (I will add them to the last blood work that we checked. Blood count is normal, no sign of infection. Liver, kidneys and electrolytes look OK.

## 2018-04-16 NOTE — PROGRESS NOTES
Will you ask how he is feeling? Uric acid level is only mildly out of goal range for gout. However, your inflammatory factors are elevated which could indicate RA. We should check a CCP and DAVIS to work this up (I will add them to the last blood work that we checked. Blood count is normal, no sign of infection. Liver, kidneys and electrolytes look OK.

## 2018-04-18 LAB
ANA SER QL: NEGATIVE
CCP IGA+IGG SERPL IA-ACNC: 11 UNITS (ref 0–19)
SPECIMEN STATUS REPORT, ROLRST: NORMAL

## 2018-04-19 NOTE — TELEPHONE ENCOUNTER
----- Message from Jed Crooks NP sent at 4/19/2018 12:31 PM EDT -----  CCP and DAVIS are normal (tests for RA)

## 2018-04-23 ENCOUNTER — TELEPHONE (OUTPATIENT)
Dept: FAMILY MEDICINE CLINIC | Age: 63
End: 2018-04-23

## 2018-04-23 DIAGNOSIS — M79.674 PAIN AND SWELLING OF TOE OF RIGHT FOOT: Primary | ICD-10-CM

## 2018-04-23 DIAGNOSIS — M79.89 PAIN AND SWELLING OF TOE OF RIGHT FOOT: Primary | ICD-10-CM

## 2018-04-23 NOTE — TELEPHONE ENCOUNTER
Let's get a x-ray of the foot. I will send order to [facility]. Bring him back in at the end of the week.

## 2018-04-30 ENCOUNTER — OFFICE VISIT (OUTPATIENT)
Dept: CARDIOLOGY CLINIC | Age: 63
End: 2018-04-30

## 2018-04-30 VITALS
WEIGHT: 226.2 LBS | HEART RATE: 88 BPM | HEIGHT: 70 IN | BODY MASS INDEX: 32.38 KG/M2 | SYSTOLIC BLOOD PRESSURE: 140 MMHG | DIASTOLIC BLOOD PRESSURE: 80 MMHG | RESPIRATION RATE: 16 BRPM

## 2018-04-30 DIAGNOSIS — I24.0 RCA OCCLUSION (HCC): Primary | ICD-10-CM

## 2018-04-30 DIAGNOSIS — E78.49 FAMILIAL HYPERLIPIDEMIA, HIGH LDL: ICD-10-CM

## 2018-04-30 DIAGNOSIS — I25.83 CORONARY ARTERY DISEASE DUE TO LIPID RICH PLAQUE: ICD-10-CM

## 2018-04-30 DIAGNOSIS — I25.10 CORONARY ARTERY DISEASE DUE TO LIPID RICH PLAQUE: ICD-10-CM

## 2018-04-30 DIAGNOSIS — I21.4 NON-ST ELEVATION (NSTEMI) MYOCARDIAL INFARCTION (HCC): ICD-10-CM

## 2018-04-30 DIAGNOSIS — I10 ESSENTIAL HYPERTENSION: ICD-10-CM

## 2018-04-30 RX ORDER — NEBIVOLOL 10 MG/1
10 TABLET ORAL DAILY
Qty: 14 TAB | Refills: 0 | Status: SHIPPED | COMMUNITY
Start: 2018-04-30 | End: 2018-05-09 | Stop reason: SDUPTHER

## 2018-04-30 RX ORDER — CHOLECALCIFEROL (VITAMIN D3) 125 MCG
440 CAPSULE ORAL AS NEEDED
COMMUNITY
End: 2018-10-25

## 2018-04-30 RX ORDER — NEBIVOLOL 10 MG/1
TABLET ORAL DAILY
COMMUNITY
End: 2018-04-30 | Stop reason: SDUPTHER

## 2018-04-30 NOTE — PROGRESS NOTES
[facility]: Matias Dumont  [phone]      HPI: Rodrigo Reyes, a 58y.o. year-old who presents for evaluation of CAD with  RCA. Previously treated with dilt and imdur. Then Toprol XL, decision for medical management. Recently with angina, hr elevation, trial of toprol and Ranexa. Cant take ntg due to headaches. He did have a sharp pain going to the left arm and he did not go to the hospital at that time. He got the repatha and was doing very well and actually felt better on it but insurance refused to pay. He is a mechanical contractor, working hot and cold all the time but with exertion pushing himself, lifting, etc he has chest pain and shortness of breath. Stops 1-2 minutes and is ok then goes back to it. Short of breath climbing the steps. Also has a lot of of leg cramps and claudication. No recent testing. WILD ok in 3/15. Discussed ongoing symptoms in the setting of CTA. Treatment failure with imdur and ranexa. Also has foot swelling leg swelling, etc. Thought he had gout then thought it was infection but no diagnosis was found. Has hypoglycemia. He had an episode of thinking he had a CVA- could not speak, could not move, etc. Reversed with aspirin. Also feels very tired and wiped out. Will trial Bystolic 83YJ at night in place of metoprolol. Discussed his current excellent cad mgmt by Dr. Branden Reynoso. Also discussed minor tweaks that could help fatigue- ie change to bystolic. Versus possible benefit of  intervention based on new research. He will consider his options. Assessment/Plan:  1. CAD with angina, fatigue- change meds as above. Discussed chronic angina. Research around  intervention, etc. He will consider his options and how he would like to proceed. 2. HTN at goal today on metoprolol and losartan  3.  Dyslipidemia- with tg as high as 792 in past,   -has tried zetia, niacin, statins, livalo, fenofibrate  -failed pravastatin and simvastatin and crestor, fluvastatin see records scanned from pharmacy regarding trials of meds. -on repath LDL 32 and TG down to 389  -not at goal now without repatha, will resubmit appeal as he should be treated with this medication and een felt better while taking it. Repeat labs now to support application. 5. Foot pain- ?gout pseudogout? 6. Demetrius- betamethasone to nose/forehead    Cath 10/16 LM ok LAD ok D1 ok D2 ok LCx mod 40-50% diseae prox. Lg RCA with large PDA/PBL filling form L-R collaterals  Cath 6/11 LM ok LAD 10% LCx 10% RCA mod size mid occlusion, with L-R collaterals  Stress 10/09 EF 43% inferior infarct, ischemia at [facility]  Soc no tob no etoh  FHX hx cad, htn dad with Mi at 36 and had homozygous FH, mom with RA. He  has a past medical history of CAD (coronary artery disease); GERD (gastroesophageal reflux disease); Hypercholesterolemia; Hypertension; and Ill-defined condition. Cardiovascular ROS: positive for - chest pain and dyspnea on exertion  Respiratory ROS: positive for - shortness of breath  Neurological ROS: no TIA or stroke symptoms  All other systems negative except as above. PE  Vitals:    04/30/18 1458   BP: 140/80   Pulse: 88   Resp: 16   Weight: 226 lb 3.2 oz (102.6 kg)   Height: 5' 10\" (1.778 m)    Body mass index is 32.46 kg/(m^2).    General appearance - alert, well appearing, and in no distress  Mental status - affect appropriate to mood  Eyes - sclera anicteric, moist mucous membranes  Neck - supple, no significant adenopathy  Lymphatics - no  lymphadenopathy  Chest - clear to auscultation, no wheezes, rales or rhonchi  Heart - normal rate, regular rhythm, normal S1, S2, no murmurs, rubs, clicks or gallops  Abdomen - soft, nontender, nondistended, no masses or organomegaly  Back exam - full range of motion, no tenderness  Neurological - cranial nerves II through XII grossly intact, no focal deficit  Musculoskeletal - no muscular tenderness noted, normal strength  Extremities - peripheral pulses normal, no pedal edema  Skin - normal coloration  no rashes    Recent Labs:  Lab Results   Component Value Date/Time    Cholesterol, total 582 (H) 05/13/2015 08:01 AM    HDL Cholesterol 38 (L) 05/13/2015 08:01 AM    LDL, calculated Comment 05/13/2015 08:01 AM    Triglyceride 1688 (HH) 05/13/2015 08:01 AM     Lab Results   Component Value Date/Time    Creatinine 0.91 04/13/2018 01:55 PM     Lab Results   Component Value Date/Time    BUN 15 04/13/2018 01:55 PM     Lab Results   Component Value Date/Time    Potassium 3.9 04/13/2018 01:55 PM     Lab Results   Component Value Date/Time    Hemoglobin A1c 5.9 (H) 10/25/2016 08:10 AM     Lab Results   Component Value Date/Time    HGB 13.4 04/13/2018 01:55 PM     Lab Results   Component Value Date/Time    PLATELET 051 30/77/2233 01:55 PM       Reviewed:  Past Medical History:   Diagnosis Date    CAD (coronary artery disease)     GERD (gastroesophageal reflux disease)     Hypercholesterolemia     Hypertension     Ill-defined condition     [place]     History   Smoking Status    Former Smoker   Smokeless Tobacco    Never Used     History   Alcohol Use    Yes     Comment: occasional     Allergies   Allergen Reactions    Pcn [Penicillins] Other (comments)     Therapy ineffective         Current Outpatient Prescriptions   Medication Sig    naproxen sodium (ALEVE) 220 mg cap Take 440 mg by mouth as needed.  metoprolol succinate (TOPROL-XL) 50 mg XL tablet Take 1 Tab by mouth daily. Indications: heart and pressure, from [place]    nitroglycerin (NITROSTAT) 0.4 mg SL tablet 1 Tab by SubLINGual route every five (5) minutes as needed for Chest Pain.  aspirin delayed-release 81 mg tablet Take  by mouth daily.  esomeprazole (NEXIUM) 40 mg capsule Take 1 Cap by mouth daily. (Patient taking differently: Take 40 mg by mouth as needed.)    losartan (COZAAR) 50 mg tablet Take  by mouth nightly.     predniSONE (STERAPRED DS) 10 mg dose pack See administration instruction per 10mg dose pack    pitavastatin calcium (LIVALO) 1 mg tab tablet Take 1 Tab by mouth daily.  triamcinolone acetonide (KENALOG) 0.1 % topical cream use thin layer bid prn 10 -14 days. on face     No current facility-administered medications for this visit.         Aidan Campuzano MD  [facility] [place]  [address] [facility] [address]  [facility], [address]

## 2018-04-30 NOTE — MR AVS SNAPSHOT
[address] [facility] [address] [place] 
[phone] Patient: Carly Prado MRN: [id number] PNS:6/46/7550 Visit Information Date & Time Provider Department Dept. Phone Encounter #  
 4/30/2018  3:40 PM Kayode Adamson MD [facility] Teysha Larsen [phone] [id number] Your Appointments 6/11/2018  4:00 PM  
ESTABLISHED PATIENT with Kayode Adamson MD  
[facility] ([address] [facility]) Appt Note: 4-6 wk f/u per Dr. Argenis Fitzpatrick; 4-6 wk f/u per Dr. Gay Case pt rsd 6/14 to 6/11 [address]  [address] [place]  
[address] [address] Upcoming Health Maintenance Date Due Hepatitis C Screening 1955 DTaP/Tdap/Td series (1 - Tdap) 6/19/1976 FOBT Q 1 YEAR AGE 50-75 6/19/2005 ZOSTER VACCINE AGE 60> 4/19/2015 Influenza Age 5 to Adult 8/1/2018 Allergies as of 4/30/2018  Review Complete On: 4/30/2018 By: Britt Spencer Severity Noted Reaction Type Reactions Pcn [Penicillins]  10/20/2016    Other (comments) Therapy ineffective Current Immunizations  Never Reviewed Name Date Influenza Vaccine (Quad) PF 10/20/2016  7:55 PM  
  
 Not reviewed this visit You Were Diagnosed With   
  
 Codes Comments Non-ST elevation (NSTEMI) myocardial infarction [facility])    -  Primary ICD-10-CM: I21.4 ICD-9-CM: 410.70 Familial hyperlipidemia, high LDL     ICD-10-CM: E78.4 ICD-9-CM: 272.4 Essential hypertension     ICD-10-CM: I10 
ICD-9-CM: 401.9 Coronary artery disease due to lipid rich plaque     ICD-10-CM: I25.10, I25.83 ICD-9-CM: 414.00, 414.3 Vitals BP Pulse Resp Height(growth percentile) Weight(growth percentile) BMI  
 140/80 (BP 1 Location: Left arm, BP Patient Position: Sitting) 88 16 5' 10\" (1.778 m) 226 lb 3.2 oz (102.6 kg) 32.46 kg/m2 Smoking Status Former Smoker Vitals History BMI and BSA Data Body Mass Index Body Surface Area  
 32.46 kg/m 2 2.25 m 2 Preferred Pharmacy Pharmacy Name Phone [address] [phone] Your Updated Medication List  
  
   
This list is accurate as of 4/30/18  4:37 PM.  Always use your most recent med list.  
  
  
  
  
 ALEVE 220 mg Cap Generic drug:  naproxen sodium Take 440 mg by mouth as needed. aspirin delayed-release 81 mg tablet Take  by mouth daily. esomeprazole 40 mg capsule Commonly known as:  NexIUM Take 1 Cap by mouth daily. losartan 50 mg tablet Commonly known as:  COZAAR Take  by mouth nightly. nebivolol 10 mg tablet Commonly known as:  BYSTOLIC Take 1 Tab by mouth daily. nitroglycerin 0.4 mg SL tablet Commonly known as:  NITROSTAT  
1 Tab by SubLINGual route every five (5) minutes as needed for Chest Pain.  
  
 pitavastatin calcium 1 mg Tab tablet Commonly known as:  LIVALO Take 1 Tab by mouth daily. predniSONE 10 mg dose pack Commonly known as:  STERAPRED DS See administration instruction per 10mg dose pack  
  
 triamcinolone acetonide 0.1 % topical cream  
Commonly known as:  KENALOG  
use thin layer bid prn 10 -14 days. on face We Performed the Following AMB POC EKG ROUTINE W/ 12 LEADS, INTER & REP [55545 CPT(R)] LIPID PANEL [44437 CPT(R)] MAGNESIUM [id number] CPT(R)] VITAMIN D, 25 HYDROXY [id number] CPT(R)] Introducing [facility]! Tanya Quinn introduces [employer] patient portal. Now you can access parts of your medical record, email your doctor's office, and request medication refills online. 1. In your internet browser, go to [email or web address] 2. Click on the First Time User? Click Here link in the Sign In box. You will see the New Member Sign Up page. 3. Enter your NXE Access Code exactly as it appears below. You will not need to use this code after youve completed the sign-up process. If you do not sign up before the expiration date, you must request a new code. · [employer]E Access Code: [email or web address] Expires: 7/12/2018  2:00 PM 
 
4. Enter the last four digits of your Social Security Number (xxxx) and Date of Birth (mm/dd/yyyy) as indicated and click Submit. You will be taken to the next sign-up page. 5. Create a NXE ID. This will be your NXE login ID and cannot be changed, so think of one that is secure and easy to remember. 6. Create a NXE password. You can change your password at any time. 7. Enter your Password Reset Question and Answer. This can be used at a later time if you forget your password. 8. Enter your e-mail address. You will receive e-mail notification when new information is available in [address] 9. Click Sign Up. You can now view and download portions of your medical record. 10. Click the Download Summary menu link to download a portable copy of your medical information. If you have questions, please visit the Frequently Asked Questions section of the NXE website. Remember, N[employer]E is NOT to be used for urgent needs. For medical emergencies, dial 911. Now available from your iPhone and Android! Please provide this summary of care documentation to your next provider. Your primary care clinician is listed as Ortega Brown. If you have any questions after today's visit, please call [phone].

## 2018-04-30 NOTE — LETTER
5/7/2018 4:59 PM 
 
Mr. Shavonne Torres [address] [address] Dear Shavonne Torres: 
 
Please find your most recent results below. Resulted Orders LIPID PANEL Result Value Ref Range Cholesterol, total 414 (H) 100 - 199 mg/dL Triglyceride 1195 (HH) 0 - 149 mg/dL Comment:  
   Results confirmed on 
dilution. HDL Cholesterol 22 (L) >39 mg/dL VLDL, calculated Comment 5 - 40 mg/dL Comment:  
   The calculation for the VLDL cholesterol is not valid when 
triglyceride level is >400 mg/dL. LDL, calculated Comment 0 - 99 mg/dL Comment:  
   Triglyceride result indicated is too high for an accurate LDL 
cholesterol estimation. Narrative Performed at:  [address]  [id number] : Baldomero Nogueira MD, Phone:  [phone] MAGNESIUM Result Value Ref Range Magnesium 2.1 1.6 - 2.3 mg/dL Narrative Performed at:  [address]  [id number] : Baldomero Nogueira MD, Phone:  [phone] VITAMIN D, 25 HYDROXY Result Value Ref Range VITAMIN D, 25-HYDROXY 15.0 (L) 30.0 - 100.0 ng/mL Comment:  
   Vitamin D deficiency has been defined by the [address] [place] [address] practice guideline as a 
level of serum 25-OH vitamin D less than 20 ng/mL (1,2). The Endocrine Society went on to further define vitamin D 
insufficiency as a level between 21 and 29 ng/mL (2). 1. IOM ([place] of [employer]). 2010. Dietary reference 
   intakes for calcium and D. [facility]: The 
   StudyBlue. 2. Chasity MF, Cheri NC, Yeison ENGLISH, et al. 
   Evaluation, treatment, and prevention of vitamin D 
   deficiency: an Endocrine Society clinical practice 
   guideline. JCEM. 2011 Jul; 96(7):1911-30. Narrative Performed at:  [address]  [id number] : Nik Kong MD, Phone:  [phone] SPECIMEN STATUS Result Value Ref Range WBC WILL FOLLOW   
 RBC WILL FOLLOW   
 HGB WILL FOLLOW   
 HCT WILL FOLLOW MCV WILL FOLLOW   
 MCH WILL FOLLOW   
 MCHC WILL FOLLOW   
 RDW WILL FOLLOW PLATELET WILL FOLLOW   
 NEUTROPHILS WILL FOLLOW Lymphocytes WILL FOLLOW MONOCYTES WILL FOLLOW   
 EOSINOPHILS WILL FOLLOW   
 BASOPHILS WILL FOLLOW   
 ABS. NEUTROPHILS WILL FOLLOW Abs Lymphocytes WILL FOLLOW   
 ABS. MONOCYTES WILL FOLLOW   
 ABS. EOSINOPHILS WILL FOLLOW   
 ABS. BASOPHILS WILL FOLLOW   
 IMMATURE GRANULOCYTES WILL FOLLOW   
 ABS. IMM. GRANS. WILL FOLLOW Narrative Performed at:  [address]  [id number] : Nik Kong MD, Phone:  [phone] CVD REPORT Result Value Ref Range INTERPRETATION Note Comment:  
   Medical Director's Note: A CV Risk Assessment Report was not 
sent because both LDL cholesterol and non-HDL cholesterol 
results are required to generate a report. A ArthroCAD interpretive report has not been generated 
since ordered tests are not currently relevant to the 
program. 
  
 PDF IMAGE Not applicable Narrative Performed at:  [address] [place] [address]  [id number] : Rahul Moise MD, Phone:  [phone] SPECIMEN STATUS REPORT Result Value Ref Range SPECIMEN STATUS REPORT COMMENT Comment:  
   No Test Indicated A lavender top tube was received with no test indicated Dear Doctor, The requisition we received for the above patient has no test 
indicated on the request form for one or more of the specimens 
submitted. The [place] Code of [employer] requires a 
written and signed request be forwarded to the testing laboratory 
following the verbal order of a laboratory test. 
Date:_________________________________ ICD-9/10 Diagnosis Code(s):___________________________________________ Physician or Authorized Designee Signature: 
______________________________________________________________________ Your signature confirms your order of the test(s) listed Required test name(s):________________________________________________ Required test number(s):______________________________________________ Please provide requested information and fax to [phone] 
to expedite testing. Narrative Performed at:  [address]  [id number] : Tracee Duran MD, Phone:  [phone] RECOMMENDATIONS: 
{RECOMMENDATION LIST:10297::\"None. Keep up the good work! \"} Please call me if you have any questions: [phone] Sincerely, Margaret Livingston MD

## 2018-05-02 ENCOUNTER — TELEPHONE (OUTPATIENT)
Dept: CARDIOLOGY CLINIC | Age: 63
End: 2018-05-02

## 2018-05-02 ENCOUNTER — CLINICAL SUPPORT (OUTPATIENT)
Dept: FAMILY MEDICINE CLINIC | Age: 63
End: 2018-05-02

## 2018-05-02 DIAGNOSIS — I10 HYPERTENSION, UNSPECIFIED TYPE: Primary | ICD-10-CM

## 2018-05-02 PROBLEM — I24.0 RCA OCCLUSION (HCC): Status: ACTIVE | Noted: 2018-05-02

## 2018-05-02 NOTE — TELEPHONE ENCOUNTER
Patients wife Spencer Bush) said she contacted repatha and they told her to let us know they will begin the appeal process.  She also wanted to let you know he had his labs drawn this morning   Phone: [phone]

## 2018-05-03 ENCOUNTER — TELEPHONE (OUTPATIENT)
Dept: CARDIOLOGY CLINIC | Age: 63
End: 2018-05-03

## 2018-05-03 DIAGNOSIS — I25.10 CORONARY ARTERY DISEASE DUE TO LIPID RICH PLAQUE: ICD-10-CM

## 2018-05-03 DIAGNOSIS — I25.83 CORONARY ARTERY DISEASE DUE TO LIPID RICH PLAQUE: ICD-10-CM

## 2018-05-03 DIAGNOSIS — E78.49 FAMILIAL HYPERLIPIDEMIA: ICD-10-CM

## 2018-05-03 DIAGNOSIS — I24.0 RCA OCCLUSION (HCC): Primary | ICD-10-CM

## 2018-05-03 DIAGNOSIS — E78.49 FAMILIAL HYPERLIPIDEMIA, HIGH LDL: ICD-10-CM

## 2018-05-03 LAB
25(OH)D3+25(OH)D2 SERPL-MCNC: 15 NG/ML (ref 30–100)
BASOPHILS # BLD AUTO: NORMAL 10*3/UL
BASOPHILS NFR BLD AUTO: NORMAL %
CHOLEST SERPL-MCNC: 414 MG/DL (ref 100–199)
EOSINOPHIL # BLD AUTO: NORMAL 10*3/UL
EOSINOPHIL NFR BLD AUTO: NORMAL %
ERYTHROCYTE [DISTWIDTH] IN BLOOD BY AUTOMATED COUNT: NORMAL %
HCT VFR BLD AUTO: NORMAL %
HDLC SERPL-MCNC: 22 MG/DL
HGB BLD-MCNC: NORMAL G/DL
IMM GRANULOCYTES # BLD: NORMAL 10*3/UL
IMM GRANULOCYTES NFR BLD: NORMAL %
INTERPRETATION, 910389: NORMAL
LDLC SERPL CALC-MCNC: ABNORMAL MG/DL (ref 0–99)
LYMPHOCYTES # BLD AUTO: NORMAL 10*3/UL
LYMPHOCYTES NFR BLD AUTO: NORMAL %
MAGNESIUM SERPL-MCNC: 2.1 MG/DL (ref 1.6–2.3)
MCH RBC QN AUTO: NORMAL PG
MCHC RBC AUTO-ENTMCNC: NORMAL G/DL
MCV RBC AUTO: NORMAL FL
MONOCYTES # BLD AUTO: NORMAL 10*3/UL
MONOCYTES NFR BLD AUTO: NORMAL %
NEUTROPHILS # BLD AUTO: NORMAL 10*3/UL
NEUTROPHILS NFR BLD AUTO: NORMAL %
PDF IMAGE, 910387: NORMAL
PLATELET # BLD AUTO: NORMAL 10*3/UL
RBC # BLD AUTO: NORMAL 10*6/UL
SPECIMEN STATUS REPORT, ROLRST: NORMAL
TRIGL SERPL-MCNC: 1195 MG/DL (ref 0–149)
VLDLC SERPL CALC-MCNC: ABNORMAL MG/DL (ref 5–40)
WBC # BLD AUTO: NORMAL 10*3/UL

## 2018-05-07 RX ORDER — ERGOCALCIFEROL 1.25 MG/1
50000 CAPSULE ORAL
Qty: 4 CAP | Refills: 1 | Status: SHIPPED | OUTPATIENT
Start: 2018-05-07 | End: 2018-09-28 | Stop reason: ALTCHOICE

## 2018-05-07 RX ORDER — ERGOCALCIFEROL 1.25 MG/1
50000 CAPSULE ORAL
COMMUNITY
End: 2018-05-07 | Stop reason: SDUPTHER

## 2018-05-07 NOTE — TELEPHONE ENCOUNTER
----- Message from Dee Pagan RN sent at 5/4/2018  5:04 PM EDT -----      ----- Message -----     From: Tamiko Padgett MD     Sent: 5/3/2018   4:24 PM       To: Dee Pagan, RN, Rohitchato Pola [place]    This may be a duplicate, but needs a direct LDL tested for his [facility] and repatha application thanks! Also need to start Vitamin D 50,000 units 1 tab every week for 8 weeks. Then once completed start Vitamin D 2000 units 1 tab daily (OTC)      Requested Prescriptions     Signed Prescriptions Disp Refills    ergocalciferol (ERGOCALCIFEROL) 50,000 unit capsule 4 Cap 1     Sig: Take 1 Cap by mouth every seven (7) days. Authorizing Provider: Claus Vinson     Ordering User: Peyton Hercules     Per orders    I spoke with spouse and lab results given.    New lab slip and Vitamin D Changes faxed to patient's home fax # [phone].  2 pt identifiers used

## 2018-05-08 ENCOUNTER — TELEPHONE (OUTPATIENT)
Dept: CARDIOLOGY CLINIC | Age: 63
End: 2018-05-08

## 2018-05-08 NOTE — TELEPHONE ENCOUNTER
Returned spouse call, 2 pt identifiers used  She advised she did received lab orders for LDL direct. As well as instructions for Vitamin D medication. Nothing further.

## 2018-05-08 NOTE — TELEPHONE ENCOUNTER
Marysol Murphy calling on behalf of the pt's lab work that needs to be drawn for the pt's [employer] application. She can be reached @ [phone] or [phone]. Thanks!

## 2018-05-09 ENCOUNTER — TELEPHONE (OUTPATIENT)
Dept: CARDIOLOGY CLINIC | Age: 63
End: 2018-05-09

## 2018-05-09 RX ORDER — NEBIVOLOL 10 MG/1
10 TABLET ORAL DAILY
Qty: 90 TAB | Refills: 3 | Status: SHIPPED | OUTPATIENT
Start: 2018-05-09 | End: 2018-06-11 | Stop reason: SDUPTHER

## 2018-05-09 NOTE — TELEPHONE ENCOUNTER
Patient's wife Quentin Siddiqui called in and needs some clarification on her 's medications: Bystolic and Vitamin D-patient's wife stated that they only prescribed the Vitamin D for 4 weeks.   Phone [phone]  Cell# [phone] [place]

## 2018-05-09 NOTE — TELEPHONE ENCOUNTER
Returned spouse call, 2 pt identifiers used    Clarified the Vitamin D script has 1 refill so patient is taking for a total of 8 weeks. Sent new script for Bystolic 10 mg to pharmacy on file.

## 2018-05-14 ENCOUNTER — LAB ONLY (OUTPATIENT)
Dept: FAMILY MEDICINE CLINIC | Age: 63
End: 2018-05-14

## 2018-05-14 DIAGNOSIS — E78.49 FAMILIAL HYPERLIPIDEMIA, HIGH LDL: Primary | ICD-10-CM

## 2018-05-14 NOTE — MR AVS SNAPSHOT
[address] [place] [address] [facility]  
[phone] Patient: Paresh Mo MRN: [id number] LTL:[id number] Visit Information Date & Time Provider Department Dept. Phone Encounter #  
 5/14/2018  7:15 AM [address] [place] [address] Road [phone] [id number] Your Appointments 6/11/2018  4:00 PM  
ESTABLISHED PATIENT with Michelle Euceda MD  
[facility] Appt Note: 4-6 wk f/u per Dr. Aleat Jones; 4-6 wk f/u per Dr. Nohemy Leavitt pt rsd 6/14 to 6/11 [address]  [address] [place] [address]  
[address] [address] Upcoming Health Maintenance Date Due Hepatitis C Screening 1955 DTaP/Tdap/Td series (1 - Tdap) 6/19/1976 FOBT Q 1 YEAR AGE 50-75 6/19/2005 ZOSTER VACCINE AGE 60> 4/19/2015 Influenza Age 5 to Adult 8/1/2018 Allergies as of 5/14/2018  Review Complete On: 5/2/2018 By: Jorden Serra Severity Noted Reaction Type Reactions Pcn [Penicillins]  10/20/2016    Other (comments) Therapy ineffective Current Immunizations  Never Reviewed Name Date Influenza Vaccine (Quad) PF 10/20/2016  7:55 PM  
  
 Not reviewed this visit Vitals Smoking Status Former Smoker Preferred Pharmacy Pharmacy Name Phone [address] [facility], [address] [address] [facility] [phone] Your Updated Medication List  
  
   
This list is accurate as of 5/14/18  7:37 AM.  Always use your most recent med list.  
  
  
  
  
 ALEVE 220 mg Cap Generic drug:  naproxen sodium Take 440 mg by mouth as needed. aspirin delayed-release 81 mg tablet Take  by mouth daily. ergocalciferol 50,000 unit capsule Commonly known as:  ERGOCALCIFEROL Take 1 Cap by mouth every seven (7) days. esomeprazole 40 mg capsule Commonly known as:  NexIUM Take 1 Cap by mouth daily. losartan 50 mg tablet Commonly known as:  COZAAR Take  by mouth nightly. nebivolol 10 mg tablet Commonly known as:  BYSTOLIC Take 1 Tab by mouth daily. nitroglycerin 0.4 mg SL tablet Commonly known as:  NITROSTAT  
1 Tab by SubLINGual route every five (5) minutes as needed for Chest Pain.  
  
 pitavastatin calcium 1 mg Tab tablet Commonly known as:  LIVALO Take 1 Tab by mouth daily. predniSONE 10 mg dose pack Commonly known as:  STERAPRED DS See administration instruction per 10mg dose pack  
  
 triamcinolone acetonide 0.1 % topical cream  
Commonly known as:  KENALOG  
use thin layer bid prn 10 -14 days. on face Introducing [facility] SERVICES! Everett Francis introduces [employer] patient portal. Now you can access parts of your medical record, email your doctor's office, and request medication refills online. 1. In your internet browser, go to [email or web address] 2. Click on the First Time User? Click Here link in the Sign In box. You will see the New Member Sign Up page. 3. Enter your [employer] Access Code exactly as it appears below. You will not need to use this code after youve completed the sign-up process. If you do not sign up before the expiration date, you must request a new code. · [employer] Access Code: [email or web address] Expires: 7/12/2018  2:00 PM 
 
4. Enter the last four digits of your Social Security Number (xxxx) and Date of Birth (mm/dd/yyyy) as indicated and click Submit. You will be taken to the next sign-up page. 5. Create a In[employer] ID. This will be your [employer] login ID and cannot be changed, so think of one that is secure and easy to remember. 6. Create a [employer] password. You can change your password at any time. 7. Enter your Password Reset Question and Answer. This can be used at a later time if you forget your password. 8. Enter your e-mail address. You will receive e-mail notification when new information is available in [address] 9. Click Sign Up. You can now view and download portions of your medical record. 10. Click the Download Summary menu link to download a portable copy of your medical information. If you have questions, please visit the Frequently Asked Questions section of the [email or web address].It website. Remember, [email or web address] is NOT to be used for urgent needs. For medical emergencies, dial 911. Now available from your iPhone and Android! Please provide this summary of care documentation to your next provider. Your primary care clinician is listed as Annemarie Ewing. If you have any questions after today's visit, please call [phone].

## 2018-05-15 LAB — LDLC SERPL DIRECT ASSAY-MCNC: 78 MG/DL (ref 0–99)

## 2018-05-29 ENCOUNTER — TELEPHONE (OUTPATIENT)
Dept: CARDIOLOGY CLINIC | Age: 63
End: 2018-05-29

## 2018-05-29 RX ORDER — ICOSAPENT ETHYL 1000 MG/1
2 CAPSULE ORAL 2 TIMES DAILY WITH MEALS
Qty: 120 CAP | Refills: 6 | Status: SHIPPED | OUTPATIENT
Start: 2018-05-29 | End: 2018-06-11 | Stop reason: SDUPTHER

## 2018-05-29 RX ORDER — ICOSAPENT ETHYL 1000 MG/1
2 CAPSULE ORAL 2 TIMES DAILY WITH MEALS
COMMUNITY
End: 2018-05-29 | Stop reason: SDUPTHER

## 2018-05-29 NOTE — TELEPHONE ENCOUNTER
----- Message from Rachel Gordon NP sent at 5/22/2018 12:42 PM EDT -----  LDL is 78 now so Dr. Matias Dumont does not think he'll get repatha at this point. Please recommend that he begin vascepa 2000mg BID in addition to livalo and then he'll need lipid panel checked with LDL direct in 3 months. Above lab results given to patient's spouse. 2 pt identifiers used. Spouse seemed to think the LDL results is incorrect and requested it be tested again. Advised to discuss with provider during upcoming appointment. Medication sent to pharmacy. Requested Prescriptions     Signed Prescriptions Disp Refills    icosapent ethyl (VASCEPA) 1 gram capsule 120 Cap 6     Sig: Take 2 Caps by mouth two (2) times daily (with meals).      Authorizing Provider: Meliton Sahni     Ordering User: Leidy Cannon       Future Appointments  Date Time Provider Dutch George   6/11/2018 4:00 PM Garcia Purcell  [address]

## 2018-06-04 ENCOUNTER — TELEPHONE (OUTPATIENT)
Dept: CARDIOLOGY CLINIC | Age: 63
End: 2018-06-04

## 2018-06-04 NOTE — TELEPHONE ENCOUNTER
Spoke to patient's wife. Identifiers x 2. States patient with complaints of fatigue, cramping in calves, stiffness of feet. Inquired to scheduling appt with PCP. Does not have PCP. Scheduled to see Dr. Juvencio Schmitt on 6-11-18. Informed her that she could move appt to earlier date to see Sean Sam NP. Discussed the need to have PCP to address non cardiac symptoms. Regarding recent labs. Patient's wife would like to have repeated. Not taking livalo or vascepa.

## 2018-06-04 NOTE — TELEPHONE ENCOUNTER
Patients wife Nando Rather called stating patient is experiencing pain in his calves. Please call her at [phone].  Thank you

## 2018-06-05 ENCOUNTER — TELEPHONE (OUTPATIENT)
Dept: FAMILY MEDICINE CLINIC | Age: 63
End: 2018-06-05

## 2018-06-05 NOTE — TELEPHONE ENCOUNTER
I did call the [address] [address] office for any available opening today with Dr. Michael Hightower. Per Justina Gongora, Call [facility], there is no available openings today.

## 2018-06-05 NOTE — TELEPHONE ENCOUNTER
Please ask patient to address further labs tests and symptoms with Dr. Victor Hugo Brink at his follow up visit next week. He does need to establish care with a primary care physician as well but Dr. Victor Hugo Brink will address his lipid therapy with him at his visit next Monday. He does not need to come in earlier than Monday to discuss with me.

## 2018-06-05 NOTE — TELEPHONE ENCOUNTER
Shiv Gallo to confirm the message regarding Mr. Jarrett Ventura and she stated to route Darlin Gil LPN a note to go ahead and COLUMBA Knutson to give a time for Mr. Jarrett Ventura to be seen on Thursday in the [place] office.

## 2018-06-06 ENCOUNTER — TELEPHONE (OUTPATIENT)
Dept: CARDIOLOGY CLINIC | Age: 63
End: 2018-06-06

## 2018-06-06 NOTE — TELEPHONE ENCOUNTER
Returned call spoke with spouse, 2 pt identifiers used  Advised spouse her letter had been received. Provider out of the office this week and patient needs to discuss further during upcoming appointment.         Future Appointments  Date Time Provider Dutch George   6/7/2018 2:20 PM Becki Kang MD [facility]   6/11/2018 4:00 PM Yuriy Leiva  [address]

## 2018-06-06 NOTE — TELEPHONE ENCOUNTER
Pt's wife calling in regards to getting another blood test done prior to his appointment on Monday. She can be reached @ [phone].      Thanks

## 2018-06-07 ENCOUNTER — TELEPHONE (OUTPATIENT)
Dept: CARDIOLOGY CLINIC | Age: 63
End: 2018-06-07

## 2018-06-07 NOTE — TELEPHONE ENCOUNTER
Identifiers x 2. Per Jerilyn Mckeon NP. Will address need for repeat lipid panel at 6-11-18 appt with Dr. Matias Dumont. Patient encouraged to consult with Dr. Matias Dumont regarding any PCP recommendations. Verbalized understanding.

## 2018-06-11 ENCOUNTER — OFFICE VISIT (OUTPATIENT)
Dept: CARDIOLOGY CLINIC | Age: 63
End: 2018-06-11

## 2018-06-11 VITALS
HEART RATE: 61 BPM | OXYGEN SATURATION: 99 % | DIASTOLIC BLOOD PRESSURE: 70 MMHG | WEIGHT: 227 LBS | BODY MASS INDEX: 32.5 KG/M2 | RESPIRATION RATE: 16 BRPM | HEIGHT: 70 IN | SYSTOLIC BLOOD PRESSURE: 110 MMHG

## 2018-06-11 DIAGNOSIS — E78.49 FAMILIAL HYPERLIPIDEMIA, HIGH LDL: ICD-10-CM

## 2018-06-11 DIAGNOSIS — I10 HYPERTENSION, UNSPECIFIED TYPE: ICD-10-CM

## 2018-06-11 DIAGNOSIS — E78.1 HYPERTRIGLYCERIDEMIA: ICD-10-CM

## 2018-06-11 DIAGNOSIS — I25.83 CORONARY ARTERY DISEASE DUE TO LIPID RICH PLAQUE: Primary | ICD-10-CM

## 2018-06-11 DIAGNOSIS — I21.4 NON-ST ELEVATION (NSTEMI) MYOCARDIAL INFARCTION (HCC): ICD-10-CM

## 2018-06-11 DIAGNOSIS — I25.10 CORONARY ARTERY DISEASE DUE TO LIPID RICH PLAQUE: Primary | ICD-10-CM

## 2018-06-11 DIAGNOSIS — I24.0 RCA OCCLUSION (HCC): ICD-10-CM

## 2018-06-11 RX ORDER — ICOSAPENT ETHYL 1000 MG/1
2 CAPSULE ORAL 2 TIMES DAILY WITH MEALS
Qty: 24 CAP | Refills: 0 | Status: SHIPPED | COMMUNITY
Start: 2018-06-11 | End: 2018-06-11 | Stop reason: SDUPTHER

## 2018-06-11 RX ORDER — ICOSAPENT ETHYL 1000 MG/1
2 CAPSULE ORAL 2 TIMES DAILY WITH MEALS
Qty: 120 CAP | Refills: 6 | Status: SHIPPED | OUTPATIENT
Start: 2018-06-11 | End: 2018-06-11 | Stop reason: SDUPTHER

## 2018-06-11 RX ORDER — NEBIVOLOL 5 MG/1
10 TABLET ORAL DAILY
Qty: 90 TAB | Refills: 3 | Status: SHIPPED | OUTPATIENT
Start: 2018-06-11 | End: 2019-05-16

## 2018-06-11 RX ORDER — LOSARTAN POTASSIUM 100 MG/1
TABLET ORAL
COMMUNITY
Start: 2018-05-10 | End: 2019-02-28 | Stop reason: SDUPTHER

## 2018-06-11 RX ORDER — ICOSAPENT ETHYL 1000 MG/1
2 CAPSULE ORAL 2 TIMES DAILY WITH MEALS
Qty: 360 CAP | Refills: 3 | Status: SHIPPED | OUTPATIENT
Start: 2018-06-11 | End: 2018-09-28 | Stop reason: ALTCHOICE

## 2018-06-11 NOTE — PROGRESS NOTES
[facility]: Alvingretta Quinnwill  [phone]      HPI: Alyx Adams, a 58y.o. year-old who presents for evaluation of CAD with  RCA. Today having some more chest pain last week. Then this week swelling but not as bad as last time. A bit today but overall better. Sounds short of breath. Hurts in the bones of the leg at night. They called repatha and talked about patient assistance and I will fill that out for them today. Could not take livalo due to nausea and severe leg cramps. The Bystolic may be making his fatigue better. Still with leg cramps at night that are bad. Previously treated with dilt and imdur. Then Toprol XL, decision for medical management. Recently with angina, hr elevation, trial of toprol and Ranexa. Cant take ntg due to headaches. He did have a sharp pain going to the left arm and he did not go to the hospital at that time. He got the repatha and was doing very well and actually felt better on it but insurance refused to pay. He is a mechanical contractor, working hot and cold all the time but with exertion pushing himself, lifting, etc he has chest pain and shortness of breath. Stops 1-2 minutes and is ok then goes back to it. Short of breath climbing the steps. Also has a lot of of leg cramps and claudication. No recent testing. WILD ok in 3/15. Discussed ongoing symptoms in the setting of CTA. Treatment failure with imdur and ranexa. Also has foot swelling leg swelling, etc. Thought he had gout then thought it was infection but no diagnosis was found. Has hypoglycemia. He had an episode of thinking he had a CVA- could not speak, could not move, etc. Reversed with aspirin. Also feels very tired and wiped out. .     Discussed his current excellent cad mgmt by Dr. Aundrea Travis. Also discussed minor tweaks that could help fatigue- ie change to bystolic. Versus possible benefit of  intervention based on new research. He will consider his options. Assessment/Plan:  1. CAD with angina, fatigue- change meds as above. Discussed chronic angina. Research around  intervention, etc. He will consider his options and how he would like to proceed. 2. HTN at goal today on bystolic and losartan  3. Dyslipidemia- with tg as high as 792 in past, , see last >1000  -has tried zetia, niacin, statins, livalo, fenofibrate  -failed pravastatin and simvastatin and crestor, fluvastatin see records scanned from pharmacy regarding trials of meds. -on repatha LDL 32 and TG down to 389  -not at goal now without repatha, will resubmit appeal as he should be treated with this medication and een felt better while taking it. Repeat labs now to support application. 5. Foot pain- ?gout pseudogout? 6. Demetrius- betamethasone to nose/forehead    Cath 10/16 LM ok LAD ok D1 ok D2 ok LCx mod 40-50% diseae prox. Lg RCA with large PDA/PBL filling form L-R collaterals  Cath 6/11 LM ok LAD 10% LCx 10% RCA mod size mid occlusion, with L-R collaterals  Stress 10/09 EF 43% inferior infarct, ischemia at [facility]  Soc no tob no etoh  FHX hx cad, htn dad with Mi at 36 and had homozygous FH, mom with RA. He  has a past medical history of CAD (coronary artery disease); GERD (gastroesophageal reflux disease); Hypercholesterolemia; Hypertension; and Ill-defined condition. Cardiovascular ROS: positive for - chest pain and dyspnea on exertion  Respiratory ROS: positive for - shortness of breath  Neurological ROS: no TIA or stroke symptoms  All other systems negative except as above. PE  Vitals:    06/11/18 1559 06/11/18 1603   BP: 100/60 110/70   Pulse: 61    Resp: 16    SpO2: 99%    Weight: 227 lb (103 kg)    Height: 5' 10\" (1.778 m)     Body mass index is 32.57 kg/(m^2).    General appearance - alert, well appearing, and in no distress  Mental status - affect appropriate to mood  Eyes - sclera anicteric, moist mucous membranes  Neck - supple, no significant adenopathy  Lymphatics - no lymphadenopathy  Chest - clear to auscultation, no wheezes, rales or rhonchi  Heart - normal rate, regular rhythm, normal S1, S2, no murmurs, rubs, clicks or gallops  Abdomen - soft, nontender, nondistended, no masses or organomegaly  Back exam - full range of motion, no tenderness  Neurological - cranial nerves II through XII grossly intact, no focal deficit  Musculoskeletal - no muscular tenderness noted, normal strength  Extremities - peripheral pulses normal, no pedal edema  Skin - normal coloration  no rashes    Recent Labs:  Lab Results   Component Value Date/Time    Cholesterol, total 414 (H) 05/02/2018 12:00 AM    HDL Cholesterol 22 (L) 05/02/2018 12:00 AM    LDL,Direct 78 05/14/2018 12:00 AM    LDL, calculated Comment 05/02/2018 12:00 AM    Triglyceride 1195 (HH) 05/02/2018 12:00 AM     Lab Results   Component Value Date/Time    Creatinine 0.91 04/13/2018 01:55 PM     Lab Results   Component Value Date/Time    BUN 15 04/13/2018 01:55 PM     Lab Results   Component Value Date/Time    Potassium 3.9 04/13/2018 01:55 PM     Lab Results   Component Value Date/Time    Hemoglobin A1c 5.9 (H) 10/25/2016 08:10 AM     Lab Results   Component Value Date/Time    HGB WILL FOLLOW 05/02/2018 12:00 AM     Lab Results   Component Value Date/Time    PLATELET WILL FOLLOW 05/02/2018 12:00 AM       Reviewed:  Past Medical History:   Diagnosis Date    CAD (coronary artery disease)     GERD (gastroesophageal reflux disease)     Hypercholesterolemia     Hypertension     Ill-defined condition     [place]     History   Smoking Status    Former Smoker   Smokeless Tobacco    Never Used     History   Alcohol Use    Yes     Comment: occasional     Allergies   Allergen Reactions    Pcn [Penicillins] Other (comments)     Therapy ineffective         Current Outpatient Prescriptions   Medication Sig    losartan (COZAAR) 100 mg tablet     nebivolol (BYSTOLIC) 10 mg tablet Take 1 Tab by mouth daily.     ergocalciferol (ERGOCALCIFEROL) 50,000 unit capsule Take 1 Cap by mouth every seven (7) days.  naproxen sodium (ALEVE) 220 mg cap Take 440 mg by mouth as needed.  triamcinolone acetonide (KENALOG) 0.1 % topical cream use thin layer bid prn 10 -14 days. on face    nitroglycerin (NITROSTAT) 0.4 mg SL tablet 1 Tab by SubLINGual route every five (5) minutes as needed for Chest Pain.  aspirin delayed-release 81 mg tablet Take  by mouth daily.  esomeprazole (NEXIUM) 40 mg capsule Take 1 Cap by mouth daily. (Patient taking differently: Take 40 mg by mouth as needed.)    icosapent ethyl (VASCEPA) 1 gram capsule Take 2 Caps by mouth two (2) times daily (with meals).  predniSONE (STERAPRED DS) 10 mg dose pack See administration instruction per 10mg dose pack    pitavastatin calcium (LIVALO) 1 mg tab tablet Take 1 Tab by mouth daily.  losartan (COZAAR) 50 mg tablet Take  by mouth nightly. No current facility-administered medications for this visit.         Prashant Rowell MD  [facility] [place]  [address] [facility]  [address]

## 2018-06-20 ENCOUNTER — TELEPHONE (OUTPATIENT)
Dept: CARDIOLOGY CLINIC | Age: 63
End: 2018-06-20

## 2018-06-21 ENCOUNTER — TELEPHONE (OUTPATIENT)
Dept: CARDIOLOGY CLINIC | Age: 63
End: 2018-06-21

## 2018-06-21 ENCOUNTER — LAB ONLY (OUTPATIENT)
Dept: FAMILY MEDICINE CLINIC | Age: 63
End: 2018-06-21

## 2018-06-21 DIAGNOSIS — I21.4 NON-ST ELEVATION (NSTEMI) MYOCARDIAL INFARCTION (HCC): Primary | ICD-10-CM

## 2018-06-21 DIAGNOSIS — I25.10 CORONARY ARTERY DISEASE INVOLVING NATIVE CORONARY ARTERY OF NATIVE HEART WITHOUT ANGINA PECTORIS: ICD-10-CM

## 2018-06-21 DIAGNOSIS — I24.0 RCA OCCLUSION (HCC): ICD-10-CM

## 2018-06-22 LAB
BASOPHILS # BLD AUTO: NORMAL 10*3/UL
BASOPHILS NFR BLD AUTO: NORMAL %
EOSINOPHIL # BLD AUTO: NORMAL 10*3/UL
EOSINOPHIL NFR BLD AUTO: NORMAL %
ERYTHROCYTE [DISTWIDTH] IN BLOOD BY AUTOMATED COUNT: NORMAL %
HCT VFR BLD AUTO: NORMAL %
HGB BLD-MCNC: NORMAL G/DL
IMM GRANULOCYTES # BLD: NORMAL 10*3/UL
IMM GRANULOCYTES NFR BLD: NORMAL %
LYMPHOCYTES # BLD AUTO: NORMAL 10*3/UL
LYMPHOCYTES NFR BLD AUTO: NORMAL %
MCH RBC QN AUTO: NORMAL PG
MCHC RBC AUTO-ENTMCNC: NORMAL G/DL
MCV RBC AUTO: NORMAL FL
MONOCYTES # BLD AUTO: NORMAL 10*3/UL
MONOCYTES NFR BLD AUTO: NORMAL %
NEUTROPHILS # BLD AUTO: NORMAL 10*3/UL
NEUTROPHILS NFR BLD AUTO: NORMAL %
PLATELET # BLD AUTO: NORMAL 10*3/UL
RBC # BLD AUTO: NORMAL 10*6/UL
WBC # BLD AUTO: NORMAL 10*3/UL

## 2018-06-23 LAB
AMYLASE SERPL-CCNC: 59 U/L (ref 31–124)
APPEARANCE UR: CLEAR
BILIRUB UR QL STRIP: NEGATIVE
CHOLEST SERPL-MCNC: 486 MG/DL (ref 100–199)
CHOLEST SERPL-MCNC: 486 MG/DL (ref 100–199)
COLOR UR: YELLOW
CRP SERPL-MCNC: 5.2 MG/L (ref 0–4.9)
GLUCOSE UR QL: NEGATIVE
HCYS SERPL-SCNC: 11.3 UMOL/L (ref 0–15)
HDL SERPL-SCNC: 20.1 UMOL/L
HDLC SERPL-MCNC: 26 MG/DL
HDLC SERPL-MCNC: 27 MG/DL
HGB UR QL STRIP: NEGATIVE
INTERPRETATION, 910389: NORMAL
KETONES UR QL STRIP: NEGATIVE
LDL SERPL QN: 19.5 NM
LDL SERPL-SCNC: 2082 NMOL/L
LDL SMALL SERPL-SCNC: 1159 NMOL/L
LDLC SERPL CALC-MCNC: ABNORMAL MG/DL (ref 0–99)
LDLC SERPL CALC-MCNC: ABNORMAL MG/DL (ref 0–99)
LDLC SERPL DIRECT ASSAY-MCNC: 113 MG/DL (ref 0–99)
LEUKOCYTE ESTERASE UR QL STRIP: NEGATIVE
LIPASE SERPL-CCNC: 43 U/L (ref 13–78)
LP-IR SCORE SERPL: 91
MICRO URNS: NORMAL
NITRITE UR QL STRIP: NEGATIVE
PH UR STRIP: 5 [PH] (ref 5–7.5)
PROT UR QL STRIP: NEGATIVE
SP GR UR: 1.01 (ref 1–1.03)
SPECIMEN STATUS REPORT, ROLRST: NORMAL
T4 SERPL-MCNC: 6.9 UG/DL (ref 4.5–12)
TESTOST FREE SERPL-MCNC: 12.8 PG/ML (ref 6.6–18.1)
TESTOST SERPL-MCNC: 174 NG/DL (ref 264–916)
TRIGL SERPL-MCNC: 1046 MG/DL (ref 0–149)
TRIGL SERPL-MCNC: 1053 MG/DL (ref 0–149)
TSH SERPL DL<=0.005 MIU/L-ACNC: 2.17 UIU/ML (ref 0.45–4.5)
UROBILINOGEN UR STRIP-MCNC: 0.2 MG/DL (ref 0.2–1)
VLDLC SERPL CALC-MCNC: ABNORMAL MG/DL (ref 5–40)

## 2018-07-20 ENCOUNTER — TELEPHONE (OUTPATIENT)
Dept: CARDIOLOGY CLINIC | Age: 63
End: 2018-07-20

## 2018-07-20 NOTE — TELEPHONE ENCOUNTER
Returned spouse call, 2 pt identifiers used  Advised spouse all paperwork had been completed and faxed for Repatha application. Advised a denial had already been received.  We would attempt an appeal.

## 2018-07-20 NOTE — TELEPHONE ENCOUNTER
Benjamín Queen from [address] calling to get a prior authorization for [address]. It was denied and the prescription needs to be appealed  She can be reached @ [phone].      Thanks

## 2018-07-20 NOTE — TELEPHONE ENCOUNTER
Pt wife calling to ensure that you received the documents she faxed to you. Patient hung up before I asked for phone number.       Phone: [phone]

## 2018-07-20 NOTE — TELEPHONE ENCOUNTER
Patients wife Quentin Joseph) called, she said [facility] said to send over the PA from the insurance company   Phone: [phone] (pt)  Fax(per wife): [phone]

## 2018-07-30 ENCOUNTER — TELEPHONE (OUTPATIENT)
Dept: CARDIOLOGY CLINIC | Age: 63
End: 2018-07-30

## 2018-08-02 ENCOUNTER — TELEPHONE (OUTPATIENT)
Dept: CARDIOLOGY CLINIC | Age: 63
End: 2018-08-02

## 2018-08-02 NOTE — TELEPHONE ENCOUNTER
Pt wife, Mookie Rayo, is calling to ask you about the status of the insurance request for [address]. She wants to know if there is anything she needs to follow up on.    Phone: [phone]

## 2018-08-03 NOTE — TELEPHONE ENCOUNTER
Returned spouse call, 2 pt identifiers used  Advised spouse an appeal letter had been faxed on 7/21/18 but have not heard back as of today.

## 2018-08-15 NOTE — TELEPHONE ENCOUNTER
Pt wife called to to see if nurse Araceli Camp received a response from [facility] regarding [address]. Pt wife also wants to discuss getting Dr. Jonathan Gan to refer he to someone to help relieve her leg pain.   Phone #[phone]  Thanks

## 2018-08-17 NOTE — TELEPHONE ENCOUNTER
LVM for patient to return call at earliest convenience. Returned call to patient's spouse: the following message given per Kelvin Hunter Flakito Buckley:  leopoldo gan ([facility])  Phone# [phone]  Mrs Flakito Buckley spoke with [place] and the appeal is still being worked on. She stated that they said it will take 60 days to go through. Due to submission being July 31st 60 days after submission.      2 pt identifiers used

## 2018-08-27 ENCOUNTER — TELEPHONE (OUTPATIENT)
Dept: CARDIOLOGY CLINIC | Age: 63
End: 2018-08-27

## 2018-08-27 NOTE — TELEPHONE ENCOUNTER
leopoldo gan ([facility])  Phone# [phone]  Mrs Casillas spoke with [place] and the appeal is still being worked on. She stated that they said it will take 60 days to go through. Due to submission being July 31st 60 days after submission.

## 2018-08-28 NOTE — TELEPHONE ENCOUNTER
Returned call to Varinder Curiel, 2 pt identifiers used  She states she spoke with insurance company and they have received appeal information but no answer yet. She will keep me updated. Patient aware.

## 2018-09-25 ENCOUNTER — TELEPHONE (OUTPATIENT)
Dept: CARDIOLOGY CLINIC | Age: 63
End: 2018-09-25

## 2018-09-25 NOTE — TELEPHONE ENCOUNTER
Appeal letter originially faxed on 7/30/18. Call placed to Luz today [phone] regarding appeal. I was told no information available. I would need to re -fax to appeals dept asking for an update. I have done this today and confirmation received. Called spouse and made her aware, unfortunately I have no news approval or denial regarding Benita Martinesshorn.     2 pt identifiers used

## 2018-09-25 NOTE — TELEPHONE ENCOUNTER
Pt wife called to discuss status update on authorization on repatha approval/denial.  Phone # [phone]  Thanks

## 2018-10-18 ENCOUNTER — TELEPHONE (OUTPATIENT)
Dept: CARDIOLOGY CLINIC | Age: 63
End: 2018-10-18

## 2018-10-18 NOTE — TELEPHONE ENCOUNTER
Pt's wife is calling to follow up on the paper work submitted to [facility] for the pt's medication Repatha. Pt's wife can be reached @ [phone].      Thanks

## 2018-10-30 NOTE — TELEPHONE ENCOUNTER
I spoke to spouse regarding an appeal for Repatha. She states she did receive a letter from [employer] stating they are working on patient's appeal. They should let her know a decision within 30 days.    2 pt identifiers used

## 2018-11-02 ENCOUNTER — TELEPHONE (OUTPATIENT)
Dept: CARDIOLOGY CLINIC | Age: 63
End: 2018-11-02

## 2018-11-02 NOTE — TELEPHONE ENCOUNTER
Pt wife called to discuss another communication she had with Repatha with nurse.   Phone # [phone]  Thanks

## 2018-11-07 NOTE — TELEPHONE ENCOUNTER
Pt's wife is calling to follow up on the appeal for the pt's medication, repatha. She can be reached @ [phone].      Thanks

## 2018-11-07 NOTE — TELEPHONE ENCOUNTER
Returned spouse call, 2 pt identifiers used    She states the appeal for Nolon Daft has been denied. I have not received anything but she will fax me a copy of her report.      Future Appointments   Date Time Provider Dutch George   11/28/2018  7:30 AM Autumn Gay NP [facility]   12/10/2018  2:00 PM Mihaela Fitzpatrick  [address] [place] [address]

## 2019-01-03 ENCOUNTER — TELEPHONE (OUTPATIENT)
Dept: CARDIOLOGY CLINIC | Age: 64
End: 2019-01-03

## 2019-01-03 DIAGNOSIS — I25.10 CORONARY ARTERY DISEASE INVOLVING NATIVE CORONARY ARTERY OF NATIVE HEART WITHOUT ANGINA PECTORIS: Primary | ICD-10-CM

## 2019-01-03 DIAGNOSIS — E78.49 FAMILIAL HYPERLIPIDEMIA, HIGH LDL: ICD-10-CM

## 2019-01-03 DIAGNOSIS — E78.1 HYPERTRIGLYCERIDEMIA: ICD-10-CM

## 2019-01-03 DIAGNOSIS — I10 ESSENTIAL HYPERTENSION: ICD-10-CM

## 2019-01-03 NOTE — TELEPHONE ENCOUNTER
Returned spouse call, 2 pt identifiers used    Discussed patient has now been approved for Melanee Found. Last set of labs were in June 2018.  Order placed in [address] [place] [address] for patient to have labs drawn at PCP office or Lab [facility].     Future Appointments   Date Time Provider Dtuch George   1/14/2019  3:00 PM Dilshad Hernandez, [address] [place] [address]   2/28/2019  7:30 AM Caitlin Love NP [address]

## 2019-01-03 NOTE — TELEPHONE ENCOUNTER
Pt wife called stated that the pt was approved and they will receive it on Tuesday.   Phone #[phone]  Thanks

## 2019-01-09 ENCOUNTER — TELEPHONE (OUTPATIENT)
Dept: CARDIOLOGY CLINIC | Age: 64
End: 2019-01-09

## 2019-01-09 LAB
CHOLEST SERPL-MCNC: 393 MG/DL (ref 100–199)
HDLC SERPL-MCNC: 35 MG/DL
INTERPRETATION, 910389: NORMAL
LDLC SERPL CALC-MCNC: ABNORMAL MG/DL (ref 0–99)
LDLC SERPL DIRECT ASSAY-MCNC: 118 MG/DL (ref 0–99)
TRIGL SERPL-MCNC: 706 MG/DL (ref 0–149)
VLDLC SERPL CALC-MCNC: ABNORMAL MG/DL (ref 5–40)

## 2019-01-09 NOTE — TELEPHONE ENCOUNTER
Pt' wife calling to advise they did receive the Repatha and do the pt continue to take his other meds. She also wanted to know if she needed to fax to paperwork to our office. She can be reached at [phone].     [place]

## 2019-02-06 ENCOUNTER — TELEPHONE (OUTPATIENT)
Dept: CARDIOLOGY CLINIC | Age: 64
End: 2019-02-06

## 2019-02-06 NOTE — TELEPHONE ENCOUNTER
Pt's wife called stating they have started the injections but she had a few questions. She also stated she is faxing over a report that she typed up with details.   Phone #[phone]  Thanks

## 2019-02-07 NOTE — TELEPHONE ENCOUNTER
Returned spouse call, 2 pt identifiers used  Letter she faxed reviewed by Dr. Ray Moreno. Patient to continue with Shay Patel and will recheck labs in 3 months. F/u as scheduled.       Future Appointments   Date Time Provider Dutch George   2/28/2019  7:30 AM Kate Rosado NP [facility] [facility]   5/16/2019 10:20 AM Patience Fernandez  [address]

## 2019-02-28 ENCOUNTER — TELEPHONE (OUTPATIENT)
Dept: CARDIOLOGY CLINIC | Age: 64
End: 2019-02-28

## 2019-02-28 RX ORDER — LOSARTAN POTASSIUM 100 MG/1
100 TABLET ORAL DAILY
Qty: 90 TAB | Refills: 1 | Status: SHIPPED | OUTPATIENT
Start: 2019-02-28 | End: 2019-07-10

## 2019-02-28 NOTE — TELEPHONE ENCOUNTER
Pt's wife called stating she wanted to let you know that he has tested positive for the flu for about a week now. She also stated if there are any questions to just give her a call.   Phone #[phone]  Thanks

## 2019-02-28 NOTE — TELEPHONE ENCOUNTER
Spoke to patient's wife, Quentin Siddiqui. Name noted on permission to release information. Identifiers x 2. Inquired regarding losartan. Taking 100 mg daily. Previously prescribed by Dr. Khoi Uriarte office. States both she and patient have been diagnosed with flu. Called and spoke to pharmacist, Yolanda Aguilar at [facility]. Confirmed that he is taking losartan 100 mg daily. Requested Prescriptions     Signed Prescriptions Disp Refills    losartan (COZAAR) 100 mg tablet 90 Tab 1     Sig: Take 1 Tab by mouth daily. Authorizing Provider: Luana Barnett     Ordering User: [employer] Covarrubias     Verbal order per Dr. Janie Simon. Follow up scheduled on 5-16-19.

## 2019-03-25 ENCOUNTER — TELEPHONE (OUTPATIENT)
Dept: CARDIOLOGY CLINIC | Age: 64
End: 2019-03-25

## 2019-03-25 DIAGNOSIS — E78.1 HYPERTRIGLYCERIDEMIA: Primary | ICD-10-CM

## 2019-03-25 NOTE — TELEPHONE ENCOUNTER
Patient's wife, Duke Espinoza, states he has completed three months of Valencia Henry and believes it is time for a blood test to re-evaluate.      Phone: [phone]

## 2019-03-25 NOTE — TELEPHONE ENCOUNTER
Spoke to patient's wife, Angeline Pedraza. Name noted on permission to release information. Identifiers x 2. Informed patient's wife that labs were obtained on 1-8-19 and do not need to be repeated until 4-8-19. Can obtain between 4-8-19 and follow up appointment on 5-16-19. Verbalized understanding. Patient's wife concerned regarding patient's glucose. To contact PCP office regarding any labs that their office would like to order.

## 2019-04-03 ENCOUNTER — TELEPHONE (OUTPATIENT)
Dept: CARDIOLOGY CLINIC | Age: 64
End: 2019-04-03

## 2019-04-03 NOTE — TELEPHONE ENCOUNTER
Returned spouse's call, 2 pt identifiers used    Spouse requesting HGA1C lab test. Patient has a f/u appt in 2 weeks with primary car. Ask her to request lab from PCP during appt. Spouse agreed.

## 2019-04-03 NOTE — TELEPHONE ENCOUNTER
Pt's wife called stating that she would need a Blood test ordered but for insurance purposes it will have to be requested as a hemoglobin A1C test.  Phone #[phone]  Thanks

## 2019-04-17 ENCOUNTER — TELEPHONE (OUTPATIENT)
Dept: CARDIOLOGY CLINIC | Age: 64
End: 2019-04-17

## 2019-04-17 NOTE — TELEPHONE ENCOUNTER
Returned call to spouse, 2 pt identifiers used    Discussed with patient her PCP is a [facility] physician and they can see the order for a lipid panel. Spouse will discuss with PCP.     Future Appointments   Date Time Provider Dutch George   5/16/2019 10:20 AM Jerolyn Hamman,  [address]

## 2019-04-17 NOTE — TELEPHONE ENCOUNTER
Pt's spouse called stating he lost the paperwork for his blood test and he would like another one through fax to his doctor.   Fax #[phone]  Phone #[phone]  Thanks

## 2019-05-16 ENCOUNTER — OFFICE VISIT (OUTPATIENT)
Dept: CARDIOLOGY CLINIC | Age: 64
End: 2019-05-16

## 2019-05-16 VITALS
DIASTOLIC BLOOD PRESSURE: 80 MMHG | OXYGEN SATURATION: 98 % | HEART RATE: 60 BPM | WEIGHT: 228.2 LBS | BODY MASS INDEX: 32.67 KG/M2 | RESPIRATION RATE: 16 BRPM | HEIGHT: 70 IN | SYSTOLIC BLOOD PRESSURE: 130 MMHG

## 2019-05-16 DIAGNOSIS — E78.1 HYPERTRIGLYCERIDEMIA: ICD-10-CM

## 2019-05-16 DIAGNOSIS — E78.5 DYSLIPIDEMIA: ICD-10-CM

## 2019-05-16 DIAGNOSIS — I25.10 CORONARY ARTERY DISEASE INVOLVING NATIVE CORONARY ARTERY OF NATIVE HEART WITHOUT ANGINA PECTORIS: Primary | ICD-10-CM

## 2019-05-16 DIAGNOSIS — R07.9 CHEST PAIN, UNSPECIFIED TYPE: ICD-10-CM

## 2019-05-16 DIAGNOSIS — R06.02 SHORTNESS OF BREATH: ICD-10-CM

## 2019-05-16 DIAGNOSIS — I10 ESSENTIAL HYPERTENSION: ICD-10-CM

## 2019-05-16 DIAGNOSIS — E55.9 VITAMIN D DEFICIENCY: ICD-10-CM

## 2019-05-16 DIAGNOSIS — R60.9 SWELLING: ICD-10-CM

## 2019-05-16 RX ORDER — ESOMEPRAZOLE MAGNESIUM 40 MG/1
40 CAPSULE, DELAYED RELEASE ORAL
COMMUNITY

## 2019-05-16 RX ORDER — NEBIVOLOL 5 MG/1
5 TABLET ORAL DAILY
COMMUNITY
End: 2019-05-16 | Stop reason: DRUGHIGH

## 2019-05-16 RX ORDER — AMLODIPINE BESYLATE 2.5 MG/1
2.5 TABLET ORAL DAILY
Qty: 90 TAB | Refills: 3 | Status: SHIPPED | OUTPATIENT
Start: 2019-05-16 | End: 2019-11-18 | Stop reason: SDUPTHER

## 2019-05-16 RX ORDER — NEBIVOLOL 5 MG/1
2.5 TABLET ORAL
COMMUNITY
End: 2019-05-30 | Stop reason: SDUPTHER

## 2019-05-16 NOTE — PROGRESS NOTES
[place]: Coletta Krabbe  [phone]  Seen by Dr. Luiz Alatorre in the past     HPI: Pau Fisher, a 61y.o. year-old who presents for follow up on CAD with  RCA. He continues to have chest pain with activity, it is worse than it was before  Feels like a dull, aching pain, feels heavy, associated with dyspnea  -very concerning for progressive angina. Has tried multiple antianginals. Not taking TG SL tabs PRN chest pain, doesn't like to take it because it gives him a headache  He has not taken it in the last year  Having dyspnea with exertion too, worse than before  Feeling tired, no energy  Reviewed medical management strategies tried in the past   He has been having more swelling  Hx of an episode of thinking he had a CVA- could not speak, could not move, reversed with aspirin. He is a mechanical contractor, working hot and cold all the time      Assessment/Plan:  1. CAD with worsening angina/SPRING - reviewed medical management strategies tried in the past fatigue  -treatment failure with Ranexa, had headaches with Imdur, previously treated with diltiazem, insufficient relief, fatigue  -changed from Toprol XL to bystolic for fatigue, will reduce bystolic to 1.0PU daily today for fatigue and begin amlodipine 2.5mg daily for angina  -he is not taking L-arginine   -had groin bleeding post cath years ago  -will refer to Dr. Dora Martins to potentially open his , pre-cath labs ordered today  -he will follow up with me in 3 months  2. HTN - at goal on current regimen   3. Dyslipidemia- Lipids 1/19 - , , LDL direct 118, HDL 35, not at goal  -continue repatha, will check fasting lipids now  -TG > 1000 in the past, previously improved with repatha, if not getting to goal will add vascepa  -has tried zetia, niacin, statins, fenofibrate  -failed pravastatin, simvastatin, rosuvastatin, fluvastatin - see records scanned from pharmacy regarding trials of meds.    -could not take livalo due to nausea and severe leg cramps. 4. Demetrius- betamethasone to nose/forehead  5. Vitamin D deficiency - will check Vitamin D level     Cath 10/16 LM ok LAD ok D1 ok D2 ok LCx mod 40-50% diseae prox. Lg RCA with large PDA/PBL filling form L-R collaterals  WILD ok in 2015  Cath 6/11 LM ok LAD 10% LCx 10% RCA mod size mid occlusion, with L-R collaterals  Stress 10/09 EF 43% inferior infarct, ischemia at [facility]    Soc no tob no etoh  FHX hx cad, htn dad with Mi at [address] and had homozygous FH, mom with RA. He  has a past medical history of CAD (coronary artery disease), GERD (gastroesophageal reflux disease), Hypercholesterolemia, Hypertension, and Ill-defined condition. Cardiovascular ROS: positive for - chest pain and dyspnea on exertion  Respiratory ROS: positive for - shortness of breath  Neurological ROS: no TIA or stroke symptoms  All other systems negative except as above. PE  Vitals:    05/16/19 1040   BP: 130/80   Pulse: 60   Resp: 16   SpO2: 98%   Weight: 228 lb 3.2 oz (103.5 kg)   Height: 5' 10\" (1.778 m)    Body mass index is 32.74 kg/m².    General appearance - alert, well appearing, and in no distress  Mental status - affect appropriate to mood  Eyes - sclera anicteric, moist mucous membranes  Neck - supple, no carotid bruits   Lymphatics - not assessed   Chest - clear to auscultation, no wheezes, rales or rhonchi  Heart - normal rate, regular rhythm, normal S1, S2, no murmurs, rubs, clicks or gallops  Abdomen - soft, nontender, nondistended  Back exam - full range of motion, no tenderness  Neurological - cranial nerves II through XII grossly intact, no focal deficit  Musculoskeletal - no muscular tenderness noted, normal strength  Extremities - peripheral pulses normal, no pedal edema  Skin - normal coloration  no rashes    Recent Labs:  Lab Results   Component Value Date/Time    Cholesterol, total 393 (H) 01/08/2019 12:00 AM    HDL Cholesterol 35 (L) 01/08/2019 12:00 AM    LDL,Direct 118 (H) 01/08/2019 12:00 AM LDL, calculated Comment 01/08/2019 12:00 AM    Triglyceride 706 (HH) 01/08/2019 12:00 AM     Lab Results   Component Value Date/Time    Creatinine 0.84 11/28/2018 08:41 AM     Lab Results   Component Value Date/Time    BUN 17 11/28/2018 08:41 AM     Lab Results   Component Value Date/Time    Potassium 4.5 11/28/2018 08:41 AM     Lab Results   Component Value Date/Time    Hemoglobin A1c 5.9 (H) 10/25/2016 08:10 AM     Lab Results   Component Value Date/Time    HGB WILL FOLLOW 06/21/2018 12:00 AM     Lab Results   Component Value Date/Time    PLATELET WILL FOLLOW 06/21/2018 12:00 AM       Reviewed:  Past Medical History:   Diagnosis Date    CAD (coronary artery disease)     GERD (gastroesophageal reflux disease)     Hypercholesterolemia     Hypertension     Ill-defined condition     Reynauds     Social History     Tobacco Use   Smoking Status Former Smoker   Smokeless Tobacco Never Used     Social History     Substance and Sexual Activity   Alcohol Use Yes    Comment: occasional     Allergies   Allergen Reactions    Pcn [Penicillins] Other (comments)     Therapy ineffective         Current Outpatient Medications   Medication Sig    esomeprazole (NEXIUM) 40 mg capsule Take 40 mg by mouth daily as needed.  evolocumab (REPATHA SYRINGE) syringe by SubCUTAneous route every fourteen (14) days.  nebivolol (BYSTOLIC) 5 mg tablet Take 2.5 mg by mouth nightly.  amLODIPine (NORVASC) 2.5 mg tablet Take 1 Tab by mouth daily.  losartan (COZAAR) 100 mg tablet Take 1 Tab by mouth daily.  lansoprazole (PREVACID) 15 mg capsule Take 1 Cap by mouth Daily (before breakfast).  clotrimazole-betamethasone (LOTRISONE) topical cream Apply to affected area daily as needed    nitroglycerin (NITROSTAT) 0.4 mg SL tablet 1 Tab by SubLINGual route every five (5) minutes as needed for Chest Pain.  aspirin delayed-release 81 mg tablet Take  by mouth daily. No current facility-administered medications for this visit. Casandra Schultz MD  [facility] heart and [facility] [place]  [address] [facility] [address]  [address]

## 2019-05-29 RX ORDER — NEBIVOLOL HYDROCHLORIDE 10 MG/1
TABLET ORAL
Qty: 90 TAB | Refills: 3 | OUTPATIENT
Start: 2019-05-29

## 2019-05-30 RX ORDER — NEBIVOLOL 2.5 MG/1
2.5 TABLET ORAL
Qty: 90 TAB | Refills: 3 | Status: SHIPPED | OUTPATIENT
Start: 2019-05-30 | End: 2020-01-15

## 2019-05-30 NOTE — TELEPHONE ENCOUNTER
Patient would like a 90 day supply   They would like to know if you can put in for automatic refills so hey dont have to call in every time   Pharmacy confirmed   Phone: [phone]

## 2019-05-30 NOTE — TELEPHONE ENCOUNTER
Requested Prescriptions     Signed Prescriptions Disp Refills    nebivolol (BYSTOLIC) 2.5 mg tablet 90 Tab 3     Sig: Take 1 Tab by mouth nightly.      Authorizing Provider: Osvaldo Hughes     Ordering User: Ludmila Arias     Refused Prescriptions Disp Refills    BYSTOLIC 10 mg tablet [Pharmacy Med Name: BYSTOLIC 37OM       TAB] 90 Tab 3     Sig: TAKE 1 TABLET BY MOUTH  DAILY     Refused By: Osvaldo Hughes     Reason for Refusal: Request already responded to by other means (e.g. phone or fax)     Per verbal orders

## 2019-06-10 ENCOUNTER — TELEPHONE (OUTPATIENT)
Dept: CARDIOLOGY CLINIC | Age: 64
End: 2019-06-10

## 2019-06-10 ENCOUNTER — OFFICE VISIT (OUTPATIENT)
Dept: CARDIOLOGY CLINIC | Age: 64
End: 2019-06-10

## 2019-06-10 VITALS
OXYGEN SATURATION: 96 % | HEART RATE: 82 BPM | RESPIRATION RATE: 16 BRPM | SYSTOLIC BLOOD PRESSURE: 160 MMHG | DIASTOLIC BLOOD PRESSURE: 92 MMHG | HEIGHT: 70 IN | WEIGHT: 229.2 LBS | BODY MASS INDEX: 32.81 KG/M2

## 2019-06-10 DIAGNOSIS — I24.0 RCA OCCLUSION (HCC): ICD-10-CM

## 2019-06-10 DIAGNOSIS — I25.118 CORONARY ARTERY DISEASE OF NATIVE ARTERY OF NATIVE HEART WITH STABLE ANGINA PECTORIS (HCC): Primary | ICD-10-CM

## 2019-06-10 RX ORDER — CHOLECALCIFEROL (VITAMIN D3) 125 MCG
1-2 CAPSULE ORAL AS NEEDED
COMMUNITY

## 2019-06-10 RX ORDER — CLOPIDOGREL 300 MG/1
600 TABLET, FILM COATED ORAL ONCE
Qty: 2 TAB | Refills: 0 | Status: SHIPPED | OUTPATIENT
Start: 2019-06-10 | End: 2019-06-10

## 2019-06-10 NOTE — PROGRESS NOTES
[address] [place] [address] [address] [facility]  [phone]     Subjective:      Miriam Trammell is a 61 y.o. male is here NP visit. Known  RCA dating back to 2011. 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 SPRING all the time as well as fatigue.  has never been attempted. Cath from 2016 shows flush occlusion with prox cap ambiguity, 2 septals that connect and appear to be negotiable, and a negatively remodeled dominant RCA. LV fxn normal indicating viability of the inferior wall.     Patient Active Problem List    Diagnosis Date Noted    Hypertriglyceridemia 06/11/2018    RCA occlusion ([facility] 05/02/2018    Non-ST elevation (NSTEMI) myocardial infarction ([facility] 05/02/2017    Familial hyperlipidemia, high LDL 05/02/2017    CAD (coronary artery disease)     Hypertension       Rafael Bee NP  Past Medical History:   Diagnosis Date    CAD (coronary artery disease)     GERD (gastroesophageal reflux disease)     Hypercholesterolemia     Hypertension     Ill-defined condition     Reynauds      Past Surgical History:   Procedure Laterality Date    HX COLONOSCOPY  2012    est, neg    HX HEART CATHETERIZATION  2012    X2, no stents     Allergies   Allergen Reactions    Tetanus-Diphtheria Toxoids-Td Hives     Other reaction(s): red spot at injection site    Pcn [Penicillins] Other (comments)     Therapy ineffective        Family History   Problem Relation Age of Onset    Heart Disease Father     Hypertension Father     Elevated Lipids Father     Hypertension Mother     Cancer Maternal Grandfather         Throat    Liver Disease Paternal Grandmother     Heart Disease Paternal Grandmother     Heart Disease Paternal Grandfather       Social History     Socioeconomic History    Marital status:      Spouse name: Not on file    Number of children: Not on file    Years of education: Not on file    Highest education level: Not on file   Occupational History    Not on file   Social Needs    Financial resource strain: Not on file    Food insecurity:     Worry: Not on file     Inability: Not on file    Transportation needs:     Medical: Not on file     Non-medical: Not on file   Tobacco Use    Smoking status: Former Smoker    Smokeless tobacco: Never Used   Substance and Sexual Activity    Alcohol use: Yes     Comment: occasional    Drug use: No    Sexual activity: Not on file   Lifestyle    Physical activity:     Days per week: Not on file     Minutes per session: Not on file    Stress: Not on file   Relationships    Social connections:     Talks on phone: Not on file     Gets together: Not on file     Attends [employer] service: Not on file     Active member of club or organization: Not on file     Attends meetings of clubs or organizations: Not on file     Relationship status: Not on file    Intimate partner violence:     Fear of current or ex partner: Not on file     Emotionally abused: Not on file     Physically abused: Not on file     Forced sexual activity: Not on file   Other Topics Concern    Not on file   Social History Narrative    Not on file      Current Outpatient Medications   Medication Sig    naproxen sodium (ALEVE) 220 mg cap Take 1-2 Caps by mouth as needed.  clopidogrel (PLAVIX) 300 mg tab tab Take 2 Tabs by mouth once for 1 dose. Day prior to     nebivolol (BYSTOLIC) 2.5 mg tablet Take 1 Tab by mouth nightly.  esomeprazole (NEXIUM) 40 mg capsule Take 40 mg by mouth daily as needed.  evolocumab (REPATHA SYRINGE) syringe by SubCUTAneous route every fourteen (14) days.  lansoprazole (PREVACID) 15 mg capsule Take 1 Cap by mouth Daily (before breakfast). (Patient taking differently: Take 15 mg by mouth as needed.)    clotrimazole-betamethasone (LOTRISONE) topical cream Apply to affected area daily as needed    nitroglycerin (NITROSTAT) 0.4 mg SL tablet 1 Tab by SubLINGual route every five (5) minutes as needed for Chest Pain.     aspirin delayed-release 81 mg tablet Take  by mouth daily.  amLODIPine (NORVASC) 2.5 mg tablet Take 1 Tab by mouth daily.  losartan (COZAAR) 100 mg tablet Take 1 Tab by mouth daily. No current facility-administered medications for this visit. Review of Symptoms:  11 systems reviewed, negative other than as stated in the HPI    Physical ExamPhysical Exam:    Vitals:    06/10/19 1407 06/10/19 1418   BP: 152/90 (!) 160/92   Pulse: 82    Resp: 16    SpO2: 96%    Weight: 229 lb 3.2 oz (104 kg)    Height: 5' 10\" (1.778 m)      Body mass index is 32.89 kg/m². General PE   Gen:  NAD  Mental Status - Alert. General Appearance - Not in acute distress. Chest and Lung Exam   Inspection: Accessory muscles - No use of accessory muscles in breathing. Auscultation:   Breath sounds: - Normal.   Cardiovascular   Inspection: Jugular vein - Bilateral - Inspection Normal.   Palpation/Percussion:   Apical Impulse: - Normal.   Auscultation: Rhythm - Regular. Heart Sounds - S1 WNL and S2 WNL. No S3 or S4. Murmurs & Other Heart Sounds: Auscultation of the heart reveals - No Murmurs. Peripheral Vascular   Upper Extremity: Inspection - Bilateral - No Cyanotic nailbeds or Digital clubbing. Lower Extremity:   Palpation: Edema - Bilateral - No edema. Abdomen:   Soft, non-tender, bowel sounds are active.   Neuro: A&O times 3, CN and motor grossly WNL    Labs:   Lab Results   Component Value Date/Time    Cholesterol, total 393 (H) 01/08/2019 12:00 AM    Cholesterol, total 486 (H) 06/21/2018 12:00 AM    Cholesterol, total 414 (H) 05/02/2018 12:00 AM    Cholesterol, total 582 (H) 05/13/2015 08:01 AM    Cholesterol, Total 486 (H) 06/21/2018 12:00 AM    HDL Cholesterol 35 (L) 01/08/2019 12:00 AM    HDL Cholesterol 26 (L) 06/21/2018 12:00 AM    HDL Cholesterol 22 (L) 05/02/2018 12:00 AM    HDL Cholesterol 38 (L) 05/13/2015 08:01 AM    LDL,Direct 118 (H) 01/08/2019 12:00 AM    LDL,Direct 113 (H) 06/21/2018 12:00 AM LDL,Direct 78 05/14/2018 12:00 AM    LDL, calculated Comment 01/08/2019 12:00 AM    LDL, calculated Comment 06/21/2018 12:00 AM    LDL, calculated Comment 05/02/2018 12:00 AM    LDL, calculated Comment 05/13/2015 08:01 AM    Triglyceride 706 () 01/08/2019 12:00 AM    Triglyceride 1,046 () 06/21/2018 12:00 AM    Triglyceride 1,195 () 05/02/2018 12:00 AM    Triglyceride 1,688 ([facility]) 05/13/2015 08:01 AM     No results found for: CPK, CPKX, CPX  Lab Results   Component Value Date/Time    Sodium 138 11/28/2018 08:41 AM    Potassium 4.5 11/28/2018 08:41 AM    Chloride 102 11/28/2018 08:41 AM    CO2 21 11/28/2018 08:41 AM    Glucose 94 11/28/2018 08:41 AM    BUN 17 11/28/2018 08:41 AM    Creatinine 0.84 11/28/2018 08:41 AM    BUN/Creatinine ratio 20 11/28/2018 08:41 AM    GFR est  11/28/2018 08:41 AM    GFR est non-AA 93 11/28/2018 08:41 AM    Calcium 9.7 11/28/2018 08:41 AM    Bilirubin, total <0.2 11/28/2018 08:41 AM    AST (SGOT) 24 11/28/2018 08:41 AM    Alk. phosphatase 80 11/28/2018 08:41 AM    Protein, total 7.2 11/28/2018 08:41 AM    Albumin 4.4 11/28/2018 08:41 AM    A-G Ratio 1.6 11/28/2018 08:41 AM    ALT (SGPT) 26 11/28/2018 08:41 AM       EKG:  NSR normal     Assessment:     Assessment:      1. Coronary artery disease of native artery of native heart with stable angina pectoris ([address]    2. RCA occlusion ([facility] [address]        Orders Placed This Encounter    CBC WITH AUTOMATED DIFF    METABOLIC PANEL, COMPREHENSIVE    PROTHROMBIN TIME + INR    AMB POC EKG ROUTINE W/ 12 LEADS, INTER & REP     Order Specific Question:   Reason for Exam:     Answer:   ROUTINE    naproxen sodium (ALEVE) 220 mg cap     Sig: Take 1-2 Caps by mouth as needed.  clopidogrel (PLAVIX) 300 mg tab tab     Sig: Take 2 Tabs by mouth once for 1 dose. Day prior to      Dispense:  2 Tab     Refill:  0        Plan:      RCA symptomatic with SPRING, fatigue and CP interfering with his occupation.   Based on 2016 cath, will need retrograde approach due to prox cap ambiguity. Sx worse over the past 2-3 years; there is a possibility that there is new disease in the other vessels. Procedure and risks explained.     Serena Kim MD

## 2019-06-10 NOTE — TELEPHONE ENCOUNTER
clopidogrel (PLAVIX) 300 mg tab tab.  This medication was sent to [facility] as 300mg, The pharmacy called saying they do not have it in stock like this, can the prescription be changed to 75mg instead, Please give them a call back regarding this situation    thanks

## 2019-06-11 LAB
ALBUMIN SERPL-MCNC: 4.9 G/DL (ref 3.6–4.8)
ALBUMIN/GLOB SERPL: 1.9 {RATIO} (ref 1.2–2.2)
ALP SERPL-CCNC: 81 IU/L (ref 39–117)
ALT SERPL-CCNC: 27 IU/L (ref 0–44)
AST SERPL-CCNC: 25 IU/L (ref 0–40)
BASOPHILS # BLD AUTO: 0 X10E3/UL (ref 0–0.2)
BASOPHILS NFR BLD AUTO: 0 %
BILIRUB SERPL-MCNC: 0.3 MG/DL (ref 0–1.2)
BUN SERPL-MCNC: 13 MG/DL (ref 8–27)
BUN/CREAT SERPL: 12 (ref 10–24)
CALCIUM SERPL-MCNC: 9.8 MG/DL (ref 8.6–10.2)
CHLORIDE SERPL-SCNC: 104 MMOL/L (ref 96–106)
CO2 SERPL-SCNC: 21 MMOL/L (ref 20–29)
CREAT SERPL-MCNC: 1.1 MG/DL (ref 0.76–1.27)
EOSINOPHIL # BLD AUTO: 0.2 X10E3/UL (ref 0–0.4)
EOSINOPHIL NFR BLD AUTO: 1 %
ERYTHROCYTE [DISTWIDTH] IN BLOOD BY AUTOMATED COUNT: 14.3 % (ref 12.3–15.4)
GLOBULIN SER CALC-MCNC: 2.6 G/DL (ref 1.5–4.5)
GLUCOSE SERPL-MCNC: 86 MG/DL (ref 65–99)
HCT VFR BLD AUTO: 42.8 % (ref 37.5–51)
HGB BLD-MCNC: 14.6 G/DL (ref 13–17.7)
IMM GRANULOCYTES # BLD AUTO: 0.1 X10E3/UL (ref 0–0.1)
IMM GRANULOCYTES NFR BLD AUTO: 1 %
INR PPP: 1 (ref 0.8–1.2)
LYMPHOCYTES # BLD AUTO: 2.1 X10E3/UL (ref 0.7–3.1)
LYMPHOCYTES NFR BLD AUTO: 19 %
MCH RBC QN AUTO: 29.9 PG (ref 26.6–33)
MCHC RBC AUTO-ENTMCNC: 34.1 G/DL (ref 31.5–35.7)
MCV RBC AUTO: 88 FL (ref 79–97)
MONOCYTES # BLD AUTO: 0.6 X10E3/UL (ref 0.1–0.9)
MONOCYTES NFR BLD AUTO: 5 %
NEUTROPHILS # BLD AUTO: 8.2 X10E3/UL (ref 1.4–7)
NEUTROPHILS NFR BLD AUTO: 74 %
PLATELET # BLD AUTO: 263 X10E3/UL (ref 150–450)
POTASSIUM SERPL-SCNC: 4.5 MMOL/L (ref 3.5–5.2)
PROT SERPL-MCNC: 7.5 G/DL (ref 6–8.5)
PROTHROMBIN TIME: 10.4 SEC (ref 9.1–12)
RBC # BLD AUTO: 4.88 X10E6/UL (ref 4.14–5.8)
SODIUM SERPL-SCNC: 143 MMOL/L (ref 134–144)
WBC # BLD AUTO: 11.3 X10E3/UL (ref 3.4–10.8)

## 2019-06-11 RX ORDER — CLOPIDOGREL BISULFATE 75 MG/1
600 TABLET ORAL ONCE
COMMUNITY
End: 2019-06-11 | Stop reason: SDUPTHER

## 2019-06-11 RX ORDER — CLOPIDOGREL BISULFATE 75 MG/1
600 TABLET ORAL ONCE
Qty: 8 TAB | Refills: 0 | Status: SHIPPED | OUTPATIENT
Start: 2019-06-11 | End: 2019-06-11

## 2019-06-11 NOTE — TELEPHONE ENCOUNTER
Patient calling to see about plavix and also questions on the medication, please call pharmacy to straighten out mg and call patient for instructions on how to take,prior to procdure Thanks

## 2019-06-11 NOTE — TELEPHONE ENCOUNTER
Returned call to pharmacy  Confirmed pt to take Plavix 600mg once. Pharmacy states doesn't have 300 mg tablets . Pt will need to take 75 mg , take 8 tablets once.

## 2019-06-12 ENCOUNTER — TELEPHONE (OUTPATIENT)
Dept: CARDIOLOGY CLINIC | Age: 64
End: 2019-06-12

## 2019-06-12 DIAGNOSIS — I25.118 CORONARY ARTERY DISEASE OF NATIVE ARTERY OF NATIVE HEART WITH STABLE ANGINA PECTORIS (HCC): Primary | ICD-10-CM

## 2019-06-12 NOTE — PROGRESS NOTES
Cath with  declined by insurance after peer to peer. Will cancel cath scheduled for 6/13/19  Obtain lexiscan stress test and then reassess from there. Patient notified  Plan to f/u in 1-2 weeks with Dr. Alyssa Mendiola to discuss results.

## 2019-06-12 NOTE — TELEPHONE ENCOUNTER
cancelled . Stress test has been scheduled and patient to f/u in 1 week with Dr Darylene Richard per Umair Winter NP. Patient was made aware.   Cath Lab made aware

## 2019-06-19 ENCOUNTER — TELEPHONE (OUTPATIENT)
Dept: CARDIOLOGY CLINIC | Age: 64
End: 2019-06-19

## 2019-06-19 NOTE — TELEPHONE ENCOUNTER
Patients wife is calling requesting that you call in a refill for repatha to: [phone]  Phone: [phone]

## 2019-06-19 NOTE — TELEPHONE ENCOUNTER
I spoke with Abby Moon [place] [address] for [address]. Verbal prescription provided to pharmacist.  2 pt identifiers used      Requested Prescriptions     Signed Prescriptions Disp Refills    evolocumab (REPATHA SYRINGE) syringe 6 Syringe 3     Si mL by SubCUTAneous route every fourteen (14) days.      Authorizing Provider: Sara Navas     Ordering User: Michelle Houser     Per verbal orders

## 2019-06-24 ENCOUNTER — TELEPHONE (OUTPATIENT)
Dept: CARDIOLOGY CLINIC | Age: 64
End: 2019-06-24

## 2019-06-24 NOTE — TELEPHONE ENCOUNTER
----- Message from Patricia Flores sent at 6/24/2019  9:11 AM EDT -----  Regarding: nuc results requested by pt and pt spouse  please call  Narciso Parham has me setting him up for a  on Thursday---thanks please call his cell [phone]--thanks Lucas Ricks

## 2019-06-25 ENCOUNTER — TELEPHONE (OUTPATIENT)
Dept: CARDIOLOGY CLINIC | Age: 64
End: 2019-06-25

## 2019-06-25 NOTE — TELEPHONE ENCOUNTER
Pt is calling regarding stress test results, the pt has an apt tomorrow 6/26 to discuss results, and the pt has a hospital procedure scheduled for 6/27 w/ Dr. Julee Zuñiga, saw a note in [facility] where you called in reference to authorization for procedure, the pt wants to know was it approved and can results be discussed over the phone b/c they live 2 hours away and do not want to travel back to back      thanks

## 2019-06-25 NOTE — TELEPHONE ENCOUNTER
WALE Yanes A 2 hours ago (12:58 PM)      His stress test result is normal -- it doesn't show any concerns for blockages Algona Taft that he is still symptomatic, we still want to do this catheterization as sometimes a normal stress test can be a false negative (meaning it says it's normal, but really it's abnormal). Sukhjinder Hedges the fact that he is still symptomatic, the lexiscan could be a false negative, and cardiac catheterization would be the best way to assess that. I would keep his appointment with us tomorrow so we can discuss this in detail. I am going to try and figure out whether or not his procedure scheduled for Thursday has been approved -- should be able to tell him this tomorrow.      Thanks,   Elgin Lyle    Routing comment        Deuce Valdez routed conversation to Kiana Krishnan NP 4 hours ago (11:45 AM)      Danica Mijares [phone]  Travis SOTO 4 hours ago (11:45 AM)      Travis SOTO 4 hours ago (11:43 AM)         Pt is calling regarding stress test results, the pt has an apt tomorrow 6/26 to discuss results, and the pt has a hospital procedure scheduled for 6/27 w/ Dr. Carroll Farley, saw a note in [facility] where you called in reference to authorization for procedure, the pt wants to know was it approved and can results be discussed over the phone b/c they live 2 hours away and do not want to travel back to back             Spoke with patient's spouse(verified HIPPA)  Verified patient with 2 patient identifiers  Informed per Elgin Lyle NP need to be seen to discuss stress test.  Spouse  verbalized understanding, confirmed f/u tomorrow 1130 am

## 2019-07-10 ENCOUNTER — OFFICE VISIT (OUTPATIENT)
Dept: CARDIOLOGY CLINIC | Age: 64
End: 2019-07-10

## 2019-07-10 VITALS
SYSTOLIC BLOOD PRESSURE: 160 MMHG | OXYGEN SATURATION: 94 % | BODY MASS INDEX: 32.35 KG/M2 | RESPIRATION RATE: 18 BRPM | WEIGHT: 226 LBS | HEART RATE: 64 BPM | HEIGHT: 70 IN | DIASTOLIC BLOOD PRESSURE: 92 MMHG

## 2019-07-10 DIAGNOSIS — I24.0 RCA OCCLUSION (HCC): ICD-10-CM

## 2019-07-10 DIAGNOSIS — E78.49 FAMILIAL HYPERLIPIDEMIA, HIGH LDL: ICD-10-CM

## 2019-07-10 DIAGNOSIS — I10 ESSENTIAL HYPERTENSION: ICD-10-CM

## 2019-07-10 DIAGNOSIS — I25.118 CORONARY ARTERY DISEASE OF NATIVE ARTERY OF NATIVE HEART WITH STABLE ANGINA PECTORIS (HCC): Primary | ICD-10-CM

## 2019-07-10 RX ORDER — CLOPIDOGREL BISULFATE 75 MG/1
600 TABLET ORAL ONCE
Qty: 8 TAB | Refills: 0 | Status: SHIPPED | OUTPATIENT
Start: 2019-07-10 | End: 2019-07-10

## 2019-07-10 NOTE — PROGRESS NOTES
Brooks Bates, [facility]    Subjective/HPI:     Mr. Annel Franco is a 59 y.o. male is here for f/u for stress testing. He has a PMHx of CAD, HTN, HLD, Reynaud's and GERD. He had lexiscan stress test to evaluate for ischemia in RCA territory for ischemia. He continues to have chest pain symptoms and shortness of breath which is limiting his lifestyle and work. He has to stop every few minutes for symptoms. He also notes leg fatigue and heaviness. He can only stand for a short period of time before his legs start to feel heavy and weak. He denies complaints of leg pains with walking. He has leg cramps. He notes leg edema by the end of the day.          PCP Provider  Yaneth Goode NP  Past Medical History:   Diagnosis Date    CAD (coronary artery disease)     GERD (gastroesophageal reflux disease)     Hypercholesterolemia     Hypertension     Ill-defined condition     Ishmaels      Past Surgical History:   Procedure Laterality Date    HX COLONOSCOPY  2012    est, neg    HX HEART CATHETERIZATION  2012    X2, no stents     Family History   Problem Relation Age of Onset    Heart Disease Father     Hypertension Father     Elevated Lipids Father     Hypertension Mother     Cancer Maternal Grandfather         Throat    Liver Disease Paternal Grandmother     Heart Disease Paternal Grandmother     Heart Disease Paternal Grandfather      Social History     Socioeconomic History    Marital status:      Spouse name: Not on file    Number of children: Not on file    Years of education: Not on file    Highest education level: Not on file   Occupational History    Not on file   Social Needs    Financial resource strain: Not on file    Food insecurity:     Worry: Not on file     Inability: Not on file    Transportation needs:     Medical: Not on file     Non-medical: Not on file   Tobacco Use    Smoking status: Former Smoker    Smokeless tobacco: Never Used   Substance and Sexual Activity    Alcohol use: Yes     Comment: occasional    Drug use: No    Sexual activity: Not on file   Lifestyle    Physical activity:     Days per week: Not on file     Minutes per session: Not on file    Stress: Not on file   Relationships    Social connections:     Talks on phone: Not on file     Gets together: Not on file     Attends [employer] service: Not on file     Active member of club or organization: Not on file     Attends meetings of clubs or organizations: Not on file     Relationship status: Not on file    Intimate partner violence:     Fear of current or ex partner: Not on file     Emotionally abused: Not on file     Physically abused: Not on file     Forced sexual activity: Not on file   Other Topics Concern    Not on file   Social History Narrative    Not on file       Allergies   Allergen Reactions    Tetanus-Diphtheria Toxoids-Td Hives     Other reaction(s): red spot at injection site    Pcn [Penicillins] Other (comments)     Therapy ineffective          Current Outpatient Medications   Medication Sig    evolocumab (REPATHA SYRINGE) syringe 1 mL by SubCUTAneous route every fourteen (14) days.  naproxen sodium (ALEVE) 220 mg cap Take 1-2 Caps by mouth as needed.  nebivolol (BYSTOLIC) 2.5 mg tablet Take 1 Tab by mouth nightly.  esomeprazole (NEXIUM) 40 mg capsule Take 40 mg by mouth daily as needed.  amLODIPine (NORVASC) 2.5 mg tablet Take 1 Tab by mouth daily.  lansoprazole (PREVACID) 15 mg capsule Take 1 Cap by mouth Daily (before breakfast). (Patient taking differently: Take 15 mg by mouth as needed.)    clotrimazole-betamethasone (LOTRISONE) topical cream Apply to affected area daily as needed    nitroglycerin (NITROSTAT) 0.4 mg SL tablet 1 Tab by SubLINGual route every five (5) minutes as needed for Chest Pain.  aspirin delayed-release 81 mg tablet Take  by mouth daily. No current facility-administered medications for this visit.          I have reviewed the problem list, allergy list, medical history, family, social history and medications. Review of Symptoms:    Review of Systems   Constitutional: Positive for malaise/fatigue. Negative for chills, fever and weight loss. HENT: Negative for nosebleeds. Eyes: Negative for blurred vision and double vision. Respiratory: Positive for shortness of breath. Negative for cough and wheezing. Cardiovascular: Positive for chest pain and leg swelling. Negative for palpitations, orthopnea and PND. Gastrointestinal: Negative for abdominal pain, blood in stool, diarrhea, nausea and vomiting. Musculoskeletal: Negative for joint pain. Skin: Negative for rash. Neurological: Negative for dizziness, tingling and loss of consciousness. Endo/Heme/Allergies: Does not bruise/bleed easily. Physical Exam:      General: Well developed, in no acute distress, cooperative and alert  HEENT: No carotid bruits, no JVD, trach is midline. Neck Supple, PEERL, EOM intact. Heart:  reg rate and rhythm; normal S1/S2; no murmurs, gallops or rubs. Respiratory: Clear bilaterally x 4, no wheezing or rales  Abdomen:   Soft, non-tender, no distention, no masses. + BS. Extremities:  Normal cap refill, no cyanosis, atraumatic. No edema. Neuro: A&Ox3, speech clear, gait stable. Skin: Skin color is normal. No rashes or lesions. Non diaphoretic  Vascular: 2+ pulses symmetric in all extremities    Vitals:    07/10/19 1137 07/10/19 1142   BP: (!) 160/92 (!) 160/92   Pulse: 64    Resp: 18    SpO2: 94%    Weight: 226 lb (102.5 kg)    Height: 5' 10\" (1.778 m)        Cardiographics    ECG: sinus rhythm  No results found for this or any previous visit.     Cardiology Labs:  Lab Results   Component Value Date/Time    Cholesterol, total 393 (H) 01/08/2019 12:00 AM    HDL Cholesterol 35 (L) 01/08/2019 12:00 AM    LDL,Direct 118 (H) 01/08/2019 12:00 AM    LDL, calculated Comment 01/08/2019 12:00 AM    Triglyceride 706 (HH) 01/08/2019 12:00 AM       Lab Results   Component Value Date/Time    Sodium 143 06/10/2019 03:40 PM    Potassium 4.5 06/10/2019 03:40 PM    Chloride 104 06/10/2019 03:40 PM    CO2 21 06/10/2019 03:40 PM    Glucose 86 06/10/2019 03:40 PM    BUN 13 06/10/2019 03:40 PM    Creatinine 1.10 06/10/2019 03:40 PM    BUN/Creatinine ratio 12 06/10/2019 03:40 PM    GFR est AA 82 06/10/2019 03:40 PM    GFR est non-AA 71 06/10/2019 03:40 PM    Calcium 9.8 06/10/2019 03:40 PM    Bilirubin, total 0.3 06/10/2019 03:40 PM    AST (SGOT) 25 06/10/2019 03:40 PM    Alk. phosphatase 81 06/10/2019 03:40 PM    Protein, total 7.5 06/10/2019 03:40 PM    Albumin 4.9 (H) 06/10/2019 03:40 PM    A-G Ratio 1.9 06/10/2019 03:40 PM    ALT (SGPT) 27 06/10/2019 03:40 PM           Assessment:     Assessment:       ICD-10-CM ICD-9-CM    1. Coronary artery disease of native artery of native heart with stable angina pectoris (HCC) I25.118 414.01 AMB POC EKG ROUTINE W/ 12 LEADS, INTER & REP     413.9 CASE REQUEST CATH LAB      METABOLIC PANEL, COMPREHENSIVE      CBC W/O DIFF      PROTHROMBIN TIME + INR      clopidogrel (PLAVIX) 75 mg tab   2. RCA occlusion ([facility]) I24.0 410.90 CASE REQUEST CATH LAB      METABOLIC PANEL, COMPREHENSIVE      CBC W/O DIFF      PROTHROMBIN TIME + INR      clopidogrel (PLAVIX) 75 mg tab   3. Essential hypertension I10 401.9    4. Familial hyperlipidemia, high LDL E78.49 272.4         Plan:     1. Coronary artery disease of native artery of native heart with stable angina pectoris ([address]  Cath in 10/2016 with 40-50% OM1 stenosis with RCA  with left to right collaterals  Lexiscan in 6/2019 was negative for ischemia  He has persistent symptoms despite optimal medical management -- could not tolerate Ranexa or Imdur due to side effects  Discussed cardiac catheterization -- he is agreeable to proceed.   Plan for bi-radial approach with right radial and possible left snuff box approach  Load with Plavix 600 mg ONCE day before the procedure    2. Essential hypertension  Resume Amlodipine daily  Continue low sodium diet    3. Familial hyperlipidemia, high LDL  Continue Repatha -- lipids by primary cardiologist, Dr. Alan Martin. Encouraged low fat, low cholesterol diet    4. Leg heaviness  Consider venous insufficiency testing after cardiac testing is complete.         Sudhakar Herrera MD

## 2019-07-10 NOTE — PROGRESS NOTES
Verified patient with 2 identifiers   Reviewed record in preparation for visit and obtained necessary documentation. Chief Complaint   Patient presents with    Results     Here to discuss stress test and future card cath     Chest Pain     off and on     Leg Swelling     sherron lower legs and feet      1. Have you been to the ER, urgent care clinic since your last visit? Hospitalized since your last visit? No     2. Have you seen or consulted any other health care providers outside of the [address] [place] [address] since your last visit? Include any pap smears or colon screening.   No

## 2019-07-24 LAB
ALBUMIN SERPL-MCNC: 4.2 G/DL (ref 3.6–4.8)
ALBUMIN/GLOB SERPL: 1.5 {RATIO} (ref 1.2–2.2)
ALP SERPL-CCNC: 78 IU/L (ref 39–117)
ALT SERPL-CCNC: 29 IU/L (ref 0–44)
AST SERPL-CCNC: 25 IU/L (ref 0–40)
BILIRUB SERPL-MCNC: <0.2 MG/DL (ref 0–1.2)
BUN SERPL-MCNC: 16 MG/DL (ref 8–27)
BUN/CREAT SERPL: 13 (ref 10–24)
CALCIUM SERPL-MCNC: 9.2 MG/DL (ref 8.6–10.2)
CHLORIDE SERPL-SCNC: 105 MMOL/L (ref 96–106)
CO2 SERPL-SCNC: 22 MMOL/L (ref 20–29)
CREAT SERPL-MCNC: 1.27 MG/DL (ref 0.76–1.27)
ERYTHROCYTE [DISTWIDTH] IN BLOOD BY AUTOMATED COUNT: 14.4 % (ref 12.3–15.4)
GLOBULIN SER CALC-MCNC: 2.8 G/DL (ref 1.5–4.5)
GLUCOSE SERPL-MCNC: 104 MG/DL (ref 65–99)
HCT VFR BLD AUTO: 43.1 % (ref 37.5–51)
HGB BLD-MCNC: 14 G/DL (ref 13–17.7)
INR PPP: 1 (ref 0.8–1.2)
INTERPRETATION: NORMAL
MCH RBC QN AUTO: 29.8 PG (ref 26.6–33)
MCHC RBC AUTO-ENTMCNC: 32.5 G/DL (ref 31.5–35.7)
MCV RBC AUTO: 92 FL (ref 79–97)
PLATELET # BLD AUTO: 274 X10E3/UL (ref 150–450)
POTASSIUM SERPL-SCNC: 4.4 MMOL/L (ref 3.5–5.2)
PROT SERPL-MCNC: 7 G/DL (ref 6–8.5)
PROTHROMBIN TIME: 10.4 SEC (ref 9.1–12)
RBC # BLD AUTO: 4.7 X10E6/UL (ref 4.14–5.8)
SODIUM SERPL-SCNC: 140 MMOL/L (ref 134–144)
WBC # BLD AUTO: 7.3 X10E3/UL (ref 3.4–10.8)

## 2019-08-01 ENCOUNTER — HOSPITAL ENCOUNTER (OUTPATIENT)
Age: 64
Discharge: HOME | End: 2019-08-01
Attending: INTERNAL MEDICINE | Admitting: INTERNAL MEDICINE
Payer: COMMERCIAL

## 2019-08-01 VITALS
SYSTOLIC BLOOD PRESSURE: 133 MMHG | RESPIRATION RATE: 16 BRPM | TEMPERATURE: 97.5 F | DIASTOLIC BLOOD PRESSURE: 87 MMHG | BODY MASS INDEX: 33.04 KG/M2 | WEIGHT: 236 LBS | HEIGHT: 71 IN | HEART RATE: 63 BPM | OXYGEN SATURATION: 98 %

## 2019-08-01 DIAGNOSIS — R07.9 CHEST PAIN, UNSPECIFIED TYPE: ICD-10-CM

## 2019-08-01 PROBLEM — Z98.890 S/P CARDIAC CATH: Status: ACTIVE | Noted: 2019-08-01

## 2019-08-01 LAB
ACT BLD: 230 SECS (ref 79–138)
ATRIAL RATE: 59 BPM
CALCULATED P AXIS, ECG09: 70 DEGREES
CALCULATED R AXIS, ECG10: 63 DEGREES
CALCULATED T AXIS, ECG11: 45 DEGREES
DIAGNOSIS, 93000: NORMAL
P-R INTERVAL, ECG05: 210 MS
Q-T INTERVAL, ECG07: 406 MS
QRS DURATION, ECG06: 96 MS
QTC CALCULATION (BEZET), ECG08: 401 MS
VENTRICULAR RATE, ECG03: 59 BPM

## 2019-08-01 PROCEDURE — 77030015766: Performed by: INTERNAL MEDICINE

## 2019-08-01 PROCEDURE — 77030019697 HC SYR ANGI INFL MRTM -B: Performed by: INTERNAL MEDICINE

## 2019-08-01 PROCEDURE — 74011000258 HC RX REV CODE- 258: Performed by: INTERNAL MEDICINE

## 2019-08-01 PROCEDURE — 74011250636 HC RX REV CODE- 250/636: Performed by: INTERNAL MEDICINE

## 2019-08-01 PROCEDURE — 93005 ELECTROCARDIOGRAM TRACING: CPT

## 2019-08-01 PROCEDURE — 93454 CORONARY ARTERY ANGIO S&I: CPT | Performed by: INTERNAL MEDICINE

## 2019-08-01 PROCEDURE — 85347 COAGULATION TIME ACTIVATED: CPT

## 2019-08-01 PROCEDURE — 74011000250 HC RX REV CODE- 250: Performed by: INTERNAL MEDICINE

## 2019-08-01 PROCEDURE — 74011250637 HC RX REV CODE- 250/637: Performed by: INTERNAL MEDICINE

## 2019-08-01 PROCEDURE — 99153 MOD SED SAME PHYS/QHP EA: CPT | Performed by: INTERNAL MEDICINE

## 2019-08-01 PROCEDURE — 99152 MOD SED SAME PHYS/QHP 5/>YRS: CPT | Performed by: INTERNAL MEDICINE

## 2019-08-01 PROCEDURE — 93571 IV DOP VEL&/PRESS C FLO 1ST: CPT | Performed by: INTERNAL MEDICINE

## 2019-08-01 PROCEDURE — 74011250636 HC RX REV CODE- 250/636

## 2019-08-01 PROCEDURE — C1894 INTRO/SHEATH, NON-LASER: HCPCS | Performed by: INTERNAL MEDICINE

## 2019-08-01 PROCEDURE — 77030008591 HC TRAP FNGR HK CNMD -B: Performed by: INTERNAL MEDICINE

## 2019-08-01 PROCEDURE — 74011636320 HC RX REV CODE- 636/320: Performed by: INTERNAL MEDICINE

## 2019-08-01 PROCEDURE — C1769 GUIDE WIRE: HCPCS | Performed by: INTERNAL MEDICINE

## 2019-08-01 PROCEDURE — C1874 STENT, COATED/COV W/DEL SYS: HCPCS | Performed by: INTERNAL MEDICINE

## 2019-08-01 PROCEDURE — 92928 PRQ TCAT PLMT NTRAC ST 1 LES: CPT | Performed by: INTERNAL MEDICINE

## 2019-08-01 PROCEDURE — C1887 CATHETER, GUIDING: HCPCS | Performed by: INTERNAL MEDICINE

## 2019-08-01 PROCEDURE — C1725 CATH, TRANSLUMIN NON-LASER: HCPCS | Performed by: INTERNAL MEDICINE

## 2019-08-01 PROCEDURE — 77030030195 HC CATH ANGI DX PRF4 MRTM -A: Performed by: INTERNAL MEDICINE

## 2019-08-01 PROCEDURE — 77030019569 HC BND COMPR RAD TERU -B: Performed by: INTERNAL MEDICINE

## 2019-08-01 DEVICE — XIENCE SIERRA™ EVEROLIMUS ELUTING CORONARY STENT SYSTEM 2.75 MM X 15 MM / RAPID-EXCHANGE
Type: IMPLANTABLE DEVICE | Status: FUNCTIONAL
Brand: XIENCE SIERRA™

## 2019-08-01 RX ORDER — CLOPIDOGREL BISULFATE 75 MG/1
75 TABLET ORAL DAILY
Qty: 30 TAB | Refills: 11 | Status: SHIPPED | OUTPATIENT
Start: 2019-08-01 | End: 2020-08-07 | Stop reason: SDUPTHER

## 2019-08-01 RX ORDER — HEPARIN SODIUM 1000 [USP'U]/ML
INJECTION, SOLUTION INTRAVENOUS; SUBCUTANEOUS AS NEEDED
Status: DISCONTINUED | OUTPATIENT
Start: 2019-08-01 | End: 2019-08-01 | Stop reason: HOSPADM

## 2019-08-01 RX ORDER — CLOPIDOGREL BISULFATE 75 MG/1
TABLET ORAL AS NEEDED
Status: DISCONTINUED | OUTPATIENT
Start: 2019-08-01 | End: 2019-08-01 | Stop reason: HOSPADM

## 2019-08-01 RX ORDER — HEPARIN SODIUM 200 [USP'U]/100ML
INJECTION, SOLUTION INTRAVENOUS
Status: COMPLETED | OUTPATIENT
Start: 2019-08-01 | End: 2019-08-01

## 2019-08-01 RX ORDER — LIDOCAINE HYDROCHLORIDE 10 MG/ML
INJECTION, SOLUTION EPIDURAL; INFILTRATION; INTRACAUDAL; PERINEURAL AS NEEDED
Status: DISCONTINUED | OUTPATIENT
Start: 2019-08-01 | End: 2019-08-01 | Stop reason: HOSPADM

## 2019-08-01 RX ORDER — VERAPAMIL HYDROCHLORIDE 2.5 MG/ML
INJECTION, SOLUTION INTRAVENOUS AS NEEDED
Status: DISCONTINUED | OUTPATIENT
Start: 2019-08-01 | End: 2019-08-01 | Stop reason: HOSPADM

## 2019-08-01 RX ORDER — MIDAZOLAM HYDROCHLORIDE 1 MG/ML
INJECTION, SOLUTION INTRAMUSCULAR; INTRAVENOUS AS NEEDED
Status: DISCONTINUED | OUTPATIENT
Start: 2019-08-01 | End: 2019-08-01 | Stop reason: HOSPADM

## 2019-08-01 RX ORDER — FENTANYL CITRATE 50 UG/ML
INJECTION, SOLUTION INTRAMUSCULAR; INTRAVENOUS AS NEEDED
Status: DISCONTINUED | OUTPATIENT
Start: 2019-08-01 | End: 2019-08-01 | Stop reason: HOSPADM

## 2019-08-01 NOTE — PROGRESS NOTES
Spiritual Care Assessment/Progress Note  [facility]      NAME: Lashae Martinez      MRN: [id number]  AGE: 59 y.o. SEX: male  Sabianism Affiliation: Spiritism   Language: English     8/1/2019     Total Time (in minutes): 12     Spiritual Assessment begun in [facility]ATH [facility] through conversation with:         []Patient        [] Family    [] Friend(s)        Reason for Consult: Advance medical directive consult     Spiritual beliefs: (Please include comment if needed)     [] Identifies with a dre tradition:         [] Supported by a dre community:            [] Claims no spiritual orientation:           [] Seeking spiritual identity:                [] Adheres to an individual form of spirituality:           [x] Not able to assess:                           Identified resources for coping:      [] Prayer                               [] Music                  [] Guided Imagery     [] Family/friends                 [] Pet visits     [] Devotional reading                         [x] Unknown     [] Other:                                            Interventions offered during this visit: (See comments for more details)    Patient Interventions: Initial visit           Plan of Care:     [x] Support spiritual and/or cultural needs    [x] Support AMD and/or advance care planning process      [] Support grieving process   [] Coordinate Rites and/or Rituals    [] Coordination with community clergy   [] No spiritual needs identified at this time   [] Detailed Plan of Care below (See Comments)  [] Make referral to Music Therapy  [] Make referral to Pet Therapy     [] Make referral to Addiction services  [] Make referral to [facility]  [] Make referral to Spiritual Care Partner  [] No future visits requested        [x] Follow up visits as needed     Comments:  Attempted visit on [facility] for advance medical directive consult. Patient was not in room; no visitors. Spoke with nurse.   Patient is off floor for a procedure and is likely to be discharged today. Will attempt to follow up later, as able.      JOEY Hernandes, [facility], [facility] [address]    [address] [place] [address] Road Paging Service  [phone])

## 2019-08-01 NOTE — ACP (ADVANCE CARE PLANNING)
Attempted visit on IVCU for advance medical directive consult. Patient was not in room; no visitors. Spoke with nurse. Patient is off floor for a procedure and is likely to be discharged today. Will attempt to follow up later, as able.      JOEY Brown, [facility], [address] [place] [address] Road Paging Service  [phone])

## 2019-08-01 NOTE — Clinical Note
Lesion: Located in the Ostium OM 1. Stent deployed. Single technique used. First inflation pressure = 16 david; inflation time: 20 sec.

## 2019-08-01 NOTE — INTERVAL H&P NOTE
H&P Update:  Lashae Martinez was seen and examined. History and physical has been reviewed. The patient has been examined.  There have been no significant clinical changes since the completion of the originally dated History and Physical.

## 2019-08-01 NOTE — Clinical Note
Lesion located in the Ostium OM 1. Balloon inflated using multiple inflations inflation technique. Pressure = 8 david; Duration = 8 sec. Inflation 2: Pressure: 8 david; Duration: 16 sec.

## 2019-08-01 NOTE — CARDIO/PULMONARY
Cardiac Rehab Note: chart review       Smoking history assessed. Patient is a former smoker. EF 52%  on 6/18/19 per nuclear stress test    CAD education folder, with heart heathy diet, warning signs, heart facts, catheterization brochure, and out patient cardiac rehab program provided to Karuna Garcia on 8/1/19                                              Educated using teach back method. Reviewed CAD diagnosis definition and purpose of intervention. Discussed risk factors for CAD to include the following: family history, elevated BMI, hyperlipidemia, hypertension, diabetes, and stress. Discussed Heart Healthy/Low Sodium (less than 2000 mg) diet. Reviewed the importance of medication compliance, follow up appointments with cardiologist, signs and symptoms of angina, and what to report to physician after discharge. Emphasized the value of cardiac rehab. Discussed Cardiac Rehab Program format, benefits, and encouraged enrollment to assist with risk modification and management. Patient lives in [place], [place]. Has knee issues. Encouraged patient to swim or use other gym equipment for exercise. He stated he will check out the local Y. Karuna Robertollei verbalized understanding with questions answered.   Gerard Aragon RN

## 2019-08-01 NOTE — PROGRESS NOTES
Vilma Inman is recovering post-procedure. R radial site dressing is CDI with small amount of swelling and light bruising. Swelling has not increased in several hours, per RN. VSS. Rhythm SR. Vilma Inman denies complaints at this time. If recovery continues to progress without complication, discharge is planned for later today. Discharged on new plavix rx. ASA, BB continued. Patient not on a statin - chart review indicates statin intolerance.          Nannette Paige NP  DNP, RN, AGACNP-BC

## 2019-08-01 NOTE — DISCHARGE INSTRUCTIONS
[address] [place] [address]  [phone]        Patient ID:  Lashae Martinez  [id number]  38 y.o.  1955    Admit Date: 8/1/2019    Discharge Date: 8/1/2019     Admitting Physician: Yuniel Meraz MD     Discharge Physician: Sangita Yap NP    Admission Diagnoses:   Chest pain, unspecified type [R07.9]    Discharge Diagnoses: Active Problems:    S/P cardiac cath (8/1/2019)      Overview: 8/1/19: PCI Cx        Discharge Condition: Good    Cardiology Procedures this Admission:  Left heart catheterization with PCI    Disposition: home    Reference discharge instructions provided by nursing for diet and activity. Signed:  Sangita Yap NP  8/1/2019  4:32 PM      Radial Cardiac Catheterization/Angiography Discharge Instructions    It is normal to feel tired the first couple days. Take it easy and follow the physicians instructions. CHECK THE CATHETER INSERTION SITE DAILY:    Remove the wrist dressing 24 hours after the procedure. You may shower 24 hours after the procedure. Wash with soap and water and pat dry. Gentle cleaning of the site with soap and water is sufficient, cover with a dry clean dressing or bandage. Do not apply creams or powders to the area. No soaking the wrist for 3 days. Leave the puncture site open to air after 24 hours post-procedure. CALL THE PHYSICIANS:     If the site becomes red, swollen or feels warm to the touch  If there is bleeding or drainage or if there is unusual pain at the radial site. If there is any minor oozing, you may apply a band-aid and remove after 12 hours. If the bleeding continues, hold pressure with the middle finger against the puncture site and the thumb against the back of the wrist,call 911 to be transported to the hospital.  DO NOT DRIVE YOURSELF, [facility]45.     ACTIVITY:   For the first 24 hours do not manipulate the wrist.  No lifting, pushing or pulling over 3-5 pounds with the affected wrist for 7 daysand no straining the insertion site. Do not life grocery bags or the garbage can, do not run the vacuum  or  for 7 days. Start with short walks as in the hospital and gradually increase as tolerated each day. It is recommended to walk 30 minutes 5-7 days per week. Follow your physicians instructions on activity. Avoid walking outside in extremes of heat or cold. Walk inside when it is cold and windy or hot and humid. Things to keep in mind:  No driving for at least 24 hours, or as designated by your physician. Limit the number of times you go up and down the stairs  Take rests and pace yourself with activity. Be careful and do not strain with bowel movements. MEDICATIONS:    Take all medications as prescribed  Call your physician if you have any questions  Keep an updated list of your medications with you at all times and give a list to your physician and pharmacist    SIGNS AND SYMPTOMS:   Be cautious of symptoms of angina or recurrent symptoms such as chest discomfort, unusual shortness of breath or fatigue. These could be symptoms of restenosis, a new blockage or a heart attack. If your symptoms are relieved with rest it is still recommended that you notify your physician of recurrent chest pain or discomfort. For CHEST PAIN or symptoms of angina not relieved with rest:  If the discomfort is not relieved with rest, and you have been prescribed Nitroglycerin, take as directed (taken under the tongue, one at a time 5 minutes apart for a total of 3 doses). If the discomfort is not relieved after the 3rd nitroglycerin, call 911. If you have not been prescribed Nitroglycerin  and your chest discomfort is not relieved with rest, call 911. AFTER CARE:   Follow up with your physician as instructed.   Follow a heart healthy diet with proper portion control, daily stress management, daily exercise, blood pressure and cholesterol control , and smoking cessation.

## 2019-08-01 NOTE — Clinical Note
TRANSFER - OUT REPORT:  
 
Verbal report given to: Muriel Bailey RN. Report consisted of patient's Situation, Background, Assessment and  
Recommendations(SBAR). Opportunity for questions and clarification was provided. Patient transported to: IVCU.

## 2019-08-21 ENCOUNTER — OFFICE VISIT (OUTPATIENT)
Dept: CARDIOLOGY CLINIC | Age: 64
End: 2019-08-21

## 2019-08-21 VITALS
DIASTOLIC BLOOD PRESSURE: 84 MMHG | OXYGEN SATURATION: 98 % | RESPIRATION RATE: 18 BRPM | SYSTOLIC BLOOD PRESSURE: 144 MMHG | HEIGHT: 71 IN | WEIGHT: 229 LBS | HEART RATE: 62 BPM | BODY MASS INDEX: 32.06 KG/M2

## 2019-08-21 DIAGNOSIS — E78.49 FAMILIAL HYPERLIPIDEMIA, HIGH LDL: ICD-10-CM

## 2019-08-21 DIAGNOSIS — I24.0 RCA OCCLUSION (HCC): ICD-10-CM

## 2019-08-21 DIAGNOSIS — E78.1 HYPERTRIGLYCERIDEMIA: ICD-10-CM

## 2019-08-21 DIAGNOSIS — Z98.890 S/P CARDIAC CATH: ICD-10-CM

## 2019-08-21 DIAGNOSIS — I25.118 CORONARY ARTERY DISEASE OF NATIVE ARTERY OF NATIVE HEART WITH STABLE ANGINA PECTORIS (HCC): Primary | ICD-10-CM

## 2019-08-21 NOTE — H&P (VIEW-ONLY)
Tawanna Conde MD          NAME:  Gerry Gama   :   1955   MRN:   [id number]   PCP:  Av Jackson NP           Subjective: The patient is a 59y.o. year old male  who returns for a routine follow-up. PCI of the circumflex did not help his Sx at all. He is still limited by SPRING and CP at work. Past Medical History:   Diagnosis Date    CAD (coronary artery disease)     GERD (gastroesophageal reflux disease)     Hypercholesterolemia     Hypertension     Ill-defined condition     Re[place]auds        ICD-10-CM ICD-9-CM    1. Coronary artery disease of native artery of native heart with stable angina pectoris (HCC) E92.652 716.86 METABOLIC PANEL, COMPREHENSIVE     413.9 PROTHROMBIN TIME + INR      CBC WITH AUTOMATED DIFF   2. Familial hyperlipidemia, high LDL E78.49 272.4 AMB POC EKG ROUTINE W/ 12 LEADS, INTER & REP      METABOLIC PANEL, COMPREHENSIVE      PROTHROMBIN TIME + INR      CBC WITH AUTOMATED DIFF   3. Hypertriglyceridemia E78.1 272.1 AMB POC EKG ROUTINE W/ 12 LEADS, INTER & REP      METABOLIC PANEL, COMPREHENSIVE      PROTHROMBIN TIME + INR      CBC WITH AUTOMATED DIFF   4. S/P cardiac cath Z98.890 V45.89 AMB POC EKG ROUTINE W/ 12 LEADS, INTER & REP      METABOLIC PANEL, COMPREHENSIVE      PROTHROMBIN TIME + INR      CBC WITH AUTOMATED DIFF   5.  RCA occlusion ([facility]) Z01.8 384.85 METABOLIC PANEL, COMPREHENSIVE      PROTHROMBIN TIME + INR      CBC WITH AUTOMATED DIFF      Social History     Tobacco Use    Smoking status: Former Smoker     Types: Cigarettes    Smokeless tobacco: Never Used    Tobacco comment: quit 40 years ago   Substance Use Topics    Alcohol use: Yes     Comment: occasional      Family History   Problem Relation Age of Onset    Heart Disease Father     Hypertension Father     Elevated Lipids Father     Hypertension Mother     Cancer Maternal Grandfather         Throat    Liver Disease Paternal Grandmother     Heart Disease Paternal Grandmother     Heart Disease Paternal Grandfather         Review of Systems  Cardiovascular: Negative except as noted in HPI      Objective:       Vitals:    08/21/19 0940 08/21/19 0954   BP: 144/90 144/84   Pulse: 62    Resp: 18    SpO2: 98%    Weight: 229 lb (103.9 kg)    Height: 5' 11\" (1.803 m)     Body mass index is 31.94 kg/m². General PE  Mental Status - Alert. General Appearance - Not in acute distress. Chest and Lung Exam   Inspection: Accessory muscles - No use of accessory muscles in breathing. Auscultation:   Breath sounds: - Normal.    Cardiovascular   Inspection: Jugular vein - Bilateral - Inspection Normal.  Palpation/Percussion:   Apical Impulse: - Normal.  Auscultation: Rhythm - Regular. Heart Sounds - S1 WNL and S2 WNL. No S3 or S4. Murmurs & Other Heart Sounds: Auscultation of the heart reveals - No Murmurs. Peripheral Vascular   Upper Extremity: Inspection - Bilateral - No Cyanotic nailbeds or Digital clubbing. Lower Extremity:   Palpation: Edema - Bilateral - No edema. Data Review:     EKG -  EKG: normal EKG, normal sinus rhythm, unchanged from previous tracings. LABS- @brieflabs@      Allergies reviewed  Allergies   Allergen Reactions    Tetanus-Diphtheria Toxoids-Td Hives     Other reaction(s): red spot at injection site    Pcn [Penicillins] Other (comments)     Therapy ineffective         Medications reviewed  Current Outpatient Medications   Medication Sig    clopidogrel (PLAVIX) 75 mg tab Take 1 Tab by mouth daily. Indications: blood clot prevention following percutaneous coronary intervention    evolocumab (REPATHA SYRINGE) syringe 1 mL by SubCUTAneous route every fourteen (14) days.  naproxen sodium (ALEVE) 220 mg cap Take 1-2 Caps by mouth as needed.  nebivolol (BYSTOLIC) 2.5 mg tablet Take 1 Tab by mouth nightly.  esomeprazole (NEXIUM) 40 mg capsule Take 40 mg by mouth daily as needed.  amLODIPine (NORVASC) 2.5 mg tablet Take 1 Tab by mouth daily.     lansoprazole (PREVACID) 15 mg capsule Take 1 Cap by mouth Daily (before breakfast). (Patient taking differently: Take 15 mg by mouth as needed.)    clotrimazole-betamethasone (LOTRISONE) topical cream Apply to affected area daily as needed    nitroglycerin (NITROSTAT) 0.4 mg SL tablet 1 Tab by SubLINGual route every five (5) minutes as needed for Chest Pain.  aspirin delayed-release 81 mg tablet Take  by mouth daily. No current facility-administered medications for this visit. Assessment:       ICD-10-CM ICD-9-CM    1. Coronary artery disease of native artery of native heart with stable angina pectoris ([facility]) L15.201 420.89 METABOLIC PANEL, COMPREHENSIVE     413.9 PROTHROMBIN TIME + INR      CBC WITH AUTOMATED DIFF   2. Familial hyperlipidemia, high LDL E78.49 272.4 AMB POC EKG ROUTINE W/ 12 LEADS, INTER & REP      METABOLIC PANEL, COMPREHENSIVE      PROTHROMBIN TIME + INR      CBC WITH AUTOMATED DIFF   3. Hypertriglyceridemia E78.1 272.1 AMB POC EKG ROUTINE W/ 12 LEADS, INTER & REP      METABOLIC PANEL, COMPREHENSIVE      PROTHROMBIN TIME + INR      CBC WITH AUTOMATED DIFF   4. S/P cardiac cath Z98.890 V45.89 AMB POC EKG ROUTINE W/ 12 LEADS, INTER & REP      METABOLIC PANEL, COMPREHENSIVE      PROTHROMBIN TIME + INR      CBC WITH AUTOMATED DIFF   5. RCA occlusion ([facility]) T76.3 720.92 METABOLIC PANEL, COMPREHENSIVE      PROTHROMBIN TIME + INR      CBC WITH AUTOMATED DIFF        Orders Placed This Encounter    METABOLIC PANEL, COMPREHENSIVE    PROTHROMBIN TIME + INR    CBC WITH AUTOMATED DIFF    AMB POC EKG ROUTINE W/ 12 LEADS, INTER & REP     Order Specific Question:   Reason for Exam:     Answer:   routine       Plan:     Remains symptomatic with angina and SPRING on two antianginals. .  Plan  PCI RCA.   CPT [address] [place] [address] MD Marcos

## 2019-08-21 NOTE — PROGRESS NOTES
Justus Roche MD          NAME:  Kendrick Taylor   :   1955   MRN:   [id number]   PCP:  Aisha Osman NP           Subjective: The patient is a 59y.o. year old male  who returns for a routine follow-up. PCI of the circumflex did not help his Sx at all. He is still limited by SPRING and CP at work. Past Medical History:   Diagnosis Date    CAD (coronary artery disease)     GERD (gastroesophageal reflux disease)     Hypercholesterolemia     Hypertension     Ill-defined condition     Re[place]auds        ICD-10-CM ICD-9-CM    1. Coronary artery disease of native artery of native heart with stable angina pectoris ([facility]) N79.955 207.81 METABOLIC PANEL, COMPREHENSIVE     413.9 PROTHROMBIN TIME + INR      CBC WITH AUTOMATED DIFF   2. Familial hyperlipidemia, high LDL E78.49 272.4 AMB POC EKG ROUTINE W/ 12 LEADS, INTER & REP      METABOLIC PANEL, COMPREHENSIVE      PROTHROMBIN TIME + INR      CBC WITH AUTOMATED DIFF   3. Hypertriglyceridemia E78.1 272.1 AMB POC EKG ROUTINE W/ 12 LEADS, INTER & REP      METABOLIC PANEL, COMPREHENSIVE      PROTHROMBIN TIME + INR      CBC WITH AUTOMATED DIFF   4. S/P cardiac cath Z98.890 V45.89 AMB POC EKG ROUTINE W/ 12 LEADS, INTER & REP      METABOLIC PANEL, COMPREHENSIVE      PROTHROMBIN TIME + INR      CBC WITH AUTOMATED DIFF   5.  RCA occlusion ([facility]) T57.8 221.73 METABOLIC PANEL, COMPREHENSIVE      PROTHROMBIN TIME + INR      CBC WITH AUTOMATED DIFF      Social History     Tobacco Use    Smoking status: Former Smoker     Types: Cigarettes    Smokeless tobacco: Never Used    Tobacco comment: quit 40 years ago   Substance Use Topics    Alcohol use: Yes     Comment: occasional      Family History   Problem Relation Age of Onset    Heart Disease Father     Hypertension Father     Elevated Lipids Father     Hypertension Mother     Cancer Maternal Grandfather         Throat    Liver Disease Paternal Grandmother     Heart Disease Paternal Grandmother     Heart Disease Paternal Grandfather         Review of Systems  Cardiovascular: Negative except as noted in HPI      Objective:       Vitals:    08/21/19 0940 08/21/19 0954   BP: 144/90 144/84   Pulse: 62    Resp: 18    SpO2: 98%    Weight: 229 lb (103.9 kg)    Height: 5' 11\" (1.803 m)     Body mass index is 31.94 kg/m². General PE  Mental Status - Alert. General Appearance - Not in acute distress. Chest and Lung Exam   Inspection: Accessory muscles - No use of accessory muscles in breathing. Auscultation:   Breath sounds: - Normal.    Cardiovascular   Inspection: Jugular vein - Bilateral - Inspection Normal.  Palpation/Percussion:   Apical Impulse: - Normal.  Auscultation: Rhythm - Regular. Heart Sounds - S1 WNL and S2 WNL. No S3 or S4. Murmurs & Other Heart Sounds: Auscultation of the heart reveals - No Murmurs. Peripheral Vascular   Upper Extremity: Inspection - Bilateral - No Cyanotic nailbeds or Digital clubbing. Lower Extremity:   Palpation: Edema - Bilateral - No edema. Data Review:     EKG -  EKG: normal EKG, normal sinus rhythm, unchanged from previous tracings. LABS- @brieflabs@      Allergies reviewed  Allergies   Allergen Reactions    Tetanus-Diphtheria Toxoids-Td Hives     Other reaction(s): red spot at injection site    Pcn [Penicillins] Other (comments)     Therapy ineffective         Medications reviewed  Current Outpatient Medications   Medication Sig    clopidogrel (PLAVIX) 75 mg tab Take 1 Tab by mouth daily. Indications: blood clot prevention following percutaneous coronary intervention    evolocumab (REPATHA SYRINGE) syringe 1 mL by SubCUTAneous route every fourteen (14) days.  naproxen sodium (ALEVE) 220 mg cap Take 1-2 Caps by mouth as needed.  nebivolol (BYSTOLIC) 2.5 mg tablet Take 1 Tab by mouth nightly.  esomeprazole (NEXIUM) 40 mg capsule Take 40 mg by mouth daily as needed.  amLODIPine (NORVASC) 2.5 mg tablet Take 1 Tab by mouth daily.     lansoprazole (PREVACID) 15 mg capsule Take 1 Cap by mouth Daily (before breakfast). (Patient taking differently: Take 15 mg by mouth as needed.)    clotrimazole-betamethasone (LOTRISONE) topical cream Apply to affected area daily as needed    nitroglycerin (NITROSTAT) 0.4 mg SL tablet 1 Tab by SubLINGual route every five (5) minutes as needed for Chest Pain.  aspirin delayed-release 81 mg tablet Take  by mouth daily. No current facility-administered medications for this visit. Assessment:       ICD-10-CM ICD-9-CM    1. Coronary artery disease of native artery of native heart with stable angina pectoris ([facility]) O73.432 922.10 METABOLIC PANEL, COMPREHENSIVE     413.9 PROTHROMBIN TIME + INR      CBC WITH AUTOMATED DIFF   2. Familial hyperlipidemia, high LDL E78.49 272.4 AMB POC EKG ROUTINE W/ 12 LEADS, INTER & REP      METABOLIC PANEL, COMPREHENSIVE      PROTHROMBIN TIME + INR      CBC WITH AUTOMATED DIFF   3. Hypertriglyceridemia E78.1 272.1 AMB POC EKG ROUTINE W/ 12 LEADS, INTER & REP      METABOLIC PANEL, COMPREHENSIVE      PROTHROMBIN TIME + INR      CBC WITH AUTOMATED DIFF   4. S/P cardiac cath Z98.890 V45.89 AMB POC EKG ROUTINE W/ 12 LEADS, INTER & REP      METABOLIC PANEL, COMPREHENSIVE      PROTHROMBIN TIME + INR      CBC WITH AUTOMATED DIFF   5. RCA occlusion ([facility]) A57.8 342.13 METABOLIC PANEL, COMPREHENSIVE      PROTHROMBIN TIME + INR      CBC WITH AUTOMATED DIFF        Orders Placed This Encounter    METABOLIC PANEL, COMPREHENSIVE    PROTHROMBIN TIME + INR    CBC WITH AUTOMATED DIFF    AMB POC EKG ROUTINE W/ 12 LEADS, INTER & REP     Order Specific Question:   Reason for Exam:     Answer:   routine       Plan:     Remains symptomatic with angina and SPRING on two antianginals. .  Plan  PCI RCA.   CPT [address] [place] [address] MD Marcos

## 2019-08-21 NOTE — LETTER
8/21/19 Patient: Smiley Moody YOB: 1955 Date of Visit: 8/21/2019 Negar Padilla NP 
[address] [facility] [address] VIA In Basket Dear Negar Padilla NP, Thank you for referring Mr. Sophia San to [address] [place] [address] for evaluation. My notes for this consultation are attached. If you have questions, please do not hesitate to call me. I look forward to following your patient along with you.  
 
 
Sincerely, 
 
Miguel Angel Samano MD

## 2019-08-21 NOTE — PROGRESS NOTES
1. Have you been to the ER, urgent care clinic since your last visit? Hospitalized since your last visit? F/u from 8/1/19.    2. Have you seen or consulted any other health care providers outside of the [address] [place] [address] since your last visit? Include any pap smears or colon screening.    No.      Chief Complaint   Patient presents with   [facility] Follow Up     pt still having same chest discomfort/pain, soboe- pt notes no improvement in symptoms, everything the same

## 2019-08-22 LAB
ALBUMIN SERPL-MCNC: 4.3 G/DL (ref 3.6–4.8)
ALBUMIN/GLOB SERPL: 1.5 {RATIO} (ref 1.2–2.2)
ALP SERPL-CCNC: 80 IU/L (ref 39–117)
ALT SERPL-CCNC: 35 IU/L (ref 0–44)
AST SERPL-CCNC: 29 IU/L (ref 0–40)
BASOPHILS # BLD AUTO: 0 X10E3/UL (ref 0–0.2)
BASOPHILS NFR BLD AUTO: 0 %
BILIRUB SERPL-MCNC: 0.2 MG/DL (ref 0–1.2)
BUN SERPL-MCNC: 12 MG/DL (ref 8–27)
BUN/CREAT SERPL: 12 (ref 10–24)
CALCIUM SERPL-MCNC: 9.3 MG/DL (ref 8.6–10.2)
CHLORIDE SERPL-SCNC: 108 MMOL/L (ref 96–106)
CO2 SERPL-SCNC: 20 MMOL/L (ref 20–29)
CREAT SERPL-MCNC: 0.97 MG/DL (ref 0.76–1.27)
EOSINOPHIL # BLD AUTO: 0.1 X10E3/UL (ref 0–0.4)
EOSINOPHIL NFR BLD AUTO: 2 %
ERYTHROCYTE [DISTWIDTH] IN BLOOD BY AUTOMATED COUNT: 14.3 % (ref 12.3–15.4)
GLOBULIN SER CALC-MCNC: 2.8 G/DL (ref 1.5–4.5)
GLUCOSE SERPL-MCNC: 108 MG/DL (ref 65–99)
HCT VFR BLD AUTO: 39.8 % (ref 37.5–51)
HGB BLD-MCNC: 13.4 G/DL (ref 13–17.7)
IMM GRANULOCYTES # BLD AUTO: 0.1 X10E3/UL (ref 0–0.1)
IMM GRANULOCYTES NFR BLD AUTO: 1 %
INR PPP: 1 (ref 0.8–1.2)
LYMPHOCYTES # BLD AUTO: 1.5 X10E3/UL (ref 0.7–3.1)
LYMPHOCYTES NFR BLD AUTO: 23 %
MCH RBC QN AUTO: 30 PG (ref 26.6–33)
MCHC RBC AUTO-ENTMCNC: 33.7 G/DL (ref 31.5–35.7)
MCV RBC AUTO: 89 FL (ref 79–97)
MONOCYTES # BLD AUTO: 0.5 X10E3/UL (ref 0.1–0.9)
MONOCYTES NFR BLD AUTO: 8 %
NEUTROPHILS # BLD AUTO: 4.3 X10E3/UL (ref 1.4–7)
NEUTROPHILS NFR BLD AUTO: 66 %
PLATELET # BLD AUTO: 213 X10E3/UL (ref 150–450)
POTASSIUM SERPL-SCNC: 4 MMOL/L (ref 3.5–5.2)
PROT SERPL-MCNC: 7.1 G/DL (ref 6–8.5)
PROTHROMBIN TIME: 10.2 SEC (ref 9.1–12)
RBC # BLD AUTO: 4.47 X10E6/UL (ref 4.14–5.8)
SODIUM SERPL-SCNC: 143 MMOL/L (ref 134–144)
WBC # BLD AUTO: 6.5 X10E3/UL (ref 3.4–10.8)

## 2019-08-26 ENCOUNTER — TELEPHONE (OUTPATIENT)
Dept: CARDIOLOGY CLINIC | Age: 64
End: 2019-08-26

## 2019-08-26 NOTE — TELEPHONE ENCOUNTER
Patients wife, Daphney Norton, would like to speak with you regarding a surgery that Dr Leroy Carrizales will be attempting for the second time.     Phone:  [phone]

## 2019-08-26 NOTE — TELEPHONE ENCOUNTER
I returned spouse call, 2 pt identifiers used    She wanted to reschedule patient's appt since he will be following with Dr. Krissy Pink after his procedure. Rescheduled appt:     Future Appointments   Date Time Provider [place] George   9/30/2019  3:00 PM Linus Pham MD [address] [facility]   12/9/2019  2:20 PM Sky Romero  [address] [place] [address]

## 2019-09-12 ENCOUNTER — HOSPITAL ENCOUNTER (OUTPATIENT)
Age: 64
Discharge: HOME | End: 2019-09-13
Attending: INTERNAL MEDICINE | Admitting: INTERNAL MEDICINE
Payer: COMMERCIAL

## 2019-09-12 DIAGNOSIS — I25.118 ATHSCL HEART DISEASE OF NATIVE COR ART W OTH ANG PCTRS (HCC): ICD-10-CM

## 2019-09-12 PROBLEM — Z78.9 STATIN INTOLERANCE: Status: ACTIVE | Noted: 2019-09-12

## 2019-09-12 LAB
ATRIAL RATE: 66 BPM
CALCULATED P AXIS, ECG09: 65 DEGREES
CALCULATED R AXIS, ECG10: 55 DEGREES
CALCULATED T AXIS, ECG11: 51 DEGREES
DIAGNOSIS, 93000: NORMAL
P-R INTERVAL, ECG05: 200 MS
Q-T INTERVAL, ECG07: 386 MS
QRS DURATION, ECG06: 92 MS
QTC CALCULATION (BEZET), ECG08: 404 MS
VENTRICULAR RATE, ECG03: 66 BPM

## 2019-09-12 PROCEDURE — 92943 PRQ TRLUML REVSC CH OCC ANT: CPT | Performed by: INTERNAL MEDICINE

## 2019-09-12 PROCEDURE — C1725 CATH, TRANSLUMIN NON-LASER: HCPCS | Performed by: INTERNAL MEDICINE

## 2019-09-12 PROCEDURE — C1769 GUIDE WIRE: HCPCS | Performed by: INTERNAL MEDICINE

## 2019-09-12 PROCEDURE — 74011250636 HC RX REV CODE- 250/636

## 2019-09-12 PROCEDURE — 74011636320 HC RX REV CODE- 636/320: Performed by: INTERNAL MEDICINE

## 2019-09-12 PROCEDURE — 77030028837 HC SYR ANGI PWR INJ COEU -A: Performed by: INTERNAL MEDICINE

## 2019-09-12 PROCEDURE — 74011250637 HC RX REV CODE- 250/637: Performed by: INTERNAL MEDICINE

## 2019-09-12 PROCEDURE — 99152 MOD SED SAME PHYS/QHP 5/>YRS: CPT | Performed by: INTERNAL MEDICINE

## 2019-09-12 PROCEDURE — C1887 CATHETER, GUIDING: HCPCS | Performed by: INTERNAL MEDICINE

## 2019-09-12 PROCEDURE — 77030012468 HC VLV BLEEDBK CNTRL ABBT -B: Performed by: INTERNAL MEDICINE

## 2019-09-12 PROCEDURE — C1874 STENT, COATED/COV W/DEL SYS: HCPCS | Performed by: INTERNAL MEDICINE

## 2019-09-12 PROCEDURE — 77030029065 HC DRSG HEMO QCLOT ZMED -B: Performed by: INTERNAL MEDICINE

## 2019-09-12 PROCEDURE — C1760 CLOSURE DEV, VASC: HCPCS | Performed by: INTERNAL MEDICINE

## 2019-09-12 PROCEDURE — 77030019697 HC SYR ANGI INFL MRTM -B: Performed by: INTERNAL MEDICINE

## 2019-09-12 PROCEDURE — 93005 ELECTROCARDIOGRAM TRACING: CPT

## 2019-09-12 PROCEDURE — 99153 MOD SED SAME PHYS/QHP EA: CPT | Performed by: INTERNAL MEDICINE

## 2019-09-12 PROCEDURE — 76937 US GUIDE VASCULAR ACCESS: CPT | Performed by: INTERNAL MEDICINE

## 2019-09-12 PROCEDURE — 74011250636 HC RX REV CODE- 250/636: Performed by: INTERNAL MEDICINE

## 2019-09-12 PROCEDURE — 74011000250 HC RX REV CODE- 250: Performed by: INTERNAL MEDICINE

## 2019-09-12 PROCEDURE — 74011250637 HC RX REV CODE- 250/637: Performed by: NURSE PRACTITIONER

## 2019-09-12 DEVICE — XIENCE SIERRA™ EVEROLIMUS ELUTING CORONARY STENT SYSTEM 3.50 MM X 15 MM / RAPID-EXCHANGE
Type: IMPLANTABLE DEVICE | Status: FUNCTIONAL
Brand: XIENCE SIERRA™

## 2019-09-12 DEVICE — XIENCE SIERRA™ EVEROLIMUS ELUTING CORONARY STENT SYSTEM 3.00 MM X 38 MM / RAPID-EXCHANGE
Type: IMPLANTABLE DEVICE | Status: FUNCTIONAL
Brand: XIENCE SIERRA™

## 2019-09-12 DEVICE — XIENCE SIERRA™ EVEROLIMUS ELUTING CORONARY STENT SYSTEM 2.50 MM X 38 MM / RAPID-EXCHANGE
Type: IMPLANTABLE DEVICE | Status: FUNCTIONAL
Brand: XIENCE SIERRA™

## 2019-09-12 RX ORDER — PROTAMINE SULFATE 10 MG/ML
INJECTION, SOLUTION INTRAVENOUS AS NEEDED
Status: DISCONTINUED | OUTPATIENT
Start: 2019-09-12 | End: 2019-09-12 | Stop reason: HOSPADM

## 2019-09-12 RX ORDER — AMLODIPINE BESYLATE 2.5 MG/1
2.5 TABLET ORAL DAILY
Status: DISCONTINUED | OUTPATIENT
Start: 2019-09-13 | End: 2019-09-12

## 2019-09-12 RX ORDER — FENTANYL CITRATE 50 UG/ML
INJECTION, SOLUTION INTRAMUSCULAR; INTRAVENOUS AS NEEDED
Status: DISCONTINUED | OUTPATIENT
Start: 2019-09-12 | End: 2019-09-12 | Stop reason: HOSPADM

## 2019-09-12 RX ORDER — CLOPIDOGREL BISULFATE 75 MG/1
TABLET ORAL AS NEEDED
Status: DISCONTINUED | OUTPATIENT
Start: 2019-09-12 | End: 2019-09-12 | Stop reason: HOSPADM

## 2019-09-12 RX ORDER — NITROGLYCERIN 400 UG/1
SPRAY ORAL AS NEEDED
Status: DISCONTINUED | OUTPATIENT
Start: 2019-09-12 | End: 2019-09-12 | Stop reason: HOSPADM

## 2019-09-12 RX ORDER — HEPARIN SODIUM 200 [USP'U]/100ML
INJECTION, SOLUTION INTRAVENOUS
Status: COMPLETED | OUTPATIENT
Start: 2019-09-12 | End: 2019-09-12

## 2019-09-12 RX ORDER — SODIUM CHLORIDE 0.9 % (FLUSH) 0.9 %
5-40 SYRINGE (ML) INJECTION EVERY 8 HOURS
Status: DISCONTINUED | OUTPATIENT
Start: 2019-09-12 | End: 2019-09-13 | Stop reason: HOSPADM

## 2019-09-12 RX ORDER — MIDAZOLAM HYDROCHLORIDE 1 MG/ML
INJECTION, SOLUTION INTRAMUSCULAR; INTRAVENOUS AS NEEDED
Status: DISCONTINUED | OUTPATIENT
Start: 2019-09-12 | End: 2019-09-12 | Stop reason: HOSPADM

## 2019-09-12 RX ORDER — HEPARIN SODIUM 1000 [USP'U]/ML
INJECTION, SOLUTION INTRAVENOUS; SUBCUTANEOUS AS NEEDED
Status: DISCONTINUED | OUTPATIENT
Start: 2019-09-12 | End: 2019-09-12 | Stop reason: HOSPADM

## 2019-09-12 RX ORDER — AMLODIPINE BESYLATE 2.5 MG/1
2.5 TABLET ORAL
Status: DISCONTINUED | OUTPATIENT
Start: 2019-09-12 | End: 2019-09-13 | Stop reason: HOSPADM

## 2019-09-12 RX ORDER — LIDOCAINE HYDROCHLORIDE 10 MG/ML
INJECTION, SOLUTION EPIDURAL; INFILTRATION; INTRACAUDAL; PERINEURAL AS NEEDED
Status: DISCONTINUED | OUTPATIENT
Start: 2019-09-12 | End: 2019-09-12 | Stop reason: HOSPADM

## 2019-09-12 RX ORDER — ACETAMINOPHEN 325 MG/1
650 TABLET ORAL
Status: DISCONTINUED | OUTPATIENT
Start: 2019-09-12 | End: 2019-09-13 | Stop reason: HOSPADM

## 2019-09-12 RX ORDER — PANTOPRAZOLE SODIUM 40 MG/1
40 TABLET, DELAYED RELEASE ORAL DAILY
Status: DISCONTINUED | OUTPATIENT
Start: 2019-09-13 | End: 2019-09-13 | Stop reason: HOSPADM

## 2019-09-12 RX ORDER — SODIUM CHLORIDE 0.9 % (FLUSH) 0.9 %
5-40 SYRINGE (ML) INJECTION AS NEEDED
Status: DISCONTINUED | OUTPATIENT
Start: 2019-09-12 | End: 2019-09-13 | Stop reason: HOSPADM

## 2019-09-12 RX ORDER — CLOPIDOGREL BISULFATE 75 MG/1
75 TABLET ORAL DAILY
Status: DISCONTINUED | OUTPATIENT
Start: 2019-09-13 | End: 2019-09-13 | Stop reason: HOSPADM

## 2019-09-12 RX ORDER — ASPIRIN 81 MG/1
81 TABLET ORAL DAILY
Status: DISCONTINUED | OUTPATIENT
Start: 2019-09-13 | End: 2019-09-12

## 2019-09-12 RX ORDER — ASPIRIN 81 MG/1
81 TABLET ORAL
Status: DISCONTINUED | OUTPATIENT
Start: 2019-09-12 | End: 2019-09-13 | Stop reason: HOSPADM

## 2019-09-12 RX ORDER — NEBIVOLOL 2.5 MG/1
2.5 TABLET ORAL
Status: DISCONTINUED | OUTPATIENT
Start: 2019-09-12 | End: 2019-09-13 | Stop reason: HOSPADM

## 2019-09-12 RX ADMIN — NEBIVOLOL HYDROCHLORIDE 2.5 MG: 2.5 TABLET ORAL at 21:06

## 2019-09-12 RX ADMIN — ACETAMINOPHEN 650 MG: 325 TABLET ORAL at 16:55

## 2019-09-12 RX ADMIN — AMLODIPINE BESYLATE 2.5 MG: 2.5 TABLET ORAL at 21:29

## 2019-09-12 RX ADMIN — ASPIRIN 81 MG: 81 TABLET, COATED ORAL at 21:29

## 2019-09-12 RX ADMIN — Medication 10 ML: at 16:56

## 2019-09-12 RX ADMIN — Medication 10 ML: at 21:06

## 2019-09-12 NOTE — Clinical Note
Lesion located in the Proximal RCA. Balloon balloon re-inflated. Pressure = 12 david; Duration = 34 sec.

## 2019-09-12 NOTE — Clinical Note
Lesion: Located in the Mid RCA. Stent inserted. Stent deployed. Single technique used. First inflation pressure = 16 david; inflation time: 15 sec.

## 2019-09-12 NOTE — Clinical Note
Lesion: Located in the Proximal RCA. Stent deployed. First inflation pressure = 16 david; inflation time: 15 sec.

## 2019-09-12 NOTE — Clinical Note
Multiple views of the left main, left coronary artery, LAD, circumflex artery and right coronary artery obtained using hand injection.  DUAL INJECTION

## 2019-09-12 NOTE — Clinical Note
Lesion located in the Proximal RCA. Balloon balloon re-inflated. Balloon inflated using multiple inflations inflation technique. Pressure = 8 david; Duration = 18 sec. Inflation 2: Pressure: 12 david; Duration: 30 sec.

## 2019-09-12 NOTE — Clinical Note
Lesion located in the Distal RCA. Balloon inflated using multiple inflations inflation technique. Pressure = 8 david; Duration = 10 sec. Inflation 2: Pressure: 8 david; Duration: 10 sec.

## 2019-09-12 NOTE — Clinical Note
Lesion located in the R PDA. Balloon inflated using single inflation technique. Pressure = 4 david; Duration = 10 sec.

## 2019-09-12 NOTE — Clinical Note
Lesion located in the Mid RCA. Balloon inserted. Balloon inflated using multiple inflations inflation technique. Pressure = 8 david; Duration = 16 sec. Inflation 2: Pressure: 8 david; Duration: 10 sec.

## 2019-09-12 NOTE — Clinical Note
TRANSFER - OUT REPORT:  
 
Verbal report given to: Shane Caro RN. Report consisted of patient's Situation, Background, Assessment and  
Recommendations(SBAR). Opportunity for questions and clarification was provided. Patient transported with a Registered Nurse and [address] [place] [address] / [facility]. Patient transported to: [facility].

## 2019-09-12 NOTE — PROGRESS NOTES
Problem: Falls - Risk of  Goal: *Absence of Falls  Description  Document Milta Raring Fall Risk and appropriate interventions in the flowsheet.   Outcome: Progressing Towards Goal  Note:   Fall Risk Interventions:            Medication Interventions: Patient to call before getting OOB, Teach patient to arise slowly                   Problem: Patient Education: Go to Patient Education Activity  Goal: Patient/Family Education  Outcome: Resolved/Met     Problem: Cath Lab Procedures: Pre-Procedure  Goal: Off Pathway (Use only if patient is Off Pathway)  Outcome: Resolved/Met  Goal: Activity/Safety  Outcome: Resolved/Met  Goal: Consults, if ordered  Outcome: Resolved/Met  Goal: Diagnostic Test/Procedures  Outcome: Resolved/Met  Goal: Nutrition/Diet  Outcome: Resolved/Met  Goal: Discharge Planning  Outcome: Resolved/Met  Goal: Medications  Outcome: Resolved/Met  Goal: Respiratory  Outcome: Resolved/Met  Goal: Treatments/Interventions/Procedures  Outcome: Resolved/Met  Goal: Psychosocial  Outcome: Resolved/Met  Goal: *Verbalize description of procedure  Outcome: Resolved/Met  Goal: *Consent signed  Outcome: Resolved/Met     Problem: Cath Lab Procedures: Post-Cath Day of Procedure (Initiate SCIP Measures for Post-Op Care)  Goal: Off Pathway (Use only if patient is Off Pathway)  Outcome: Resolved/Met  Goal: Activity/Safety  Outcome: Resolved/Met  Goal: Consults, if ordered  Outcome: Resolved/Met  Goal: Diagnostic Test/Procedures  Outcome: Resolved/Met  Goal: Nutrition/Diet  Outcome: Resolved/Met  Goal: Discharge Planning  Outcome: Resolved/Met  Goal: Medications  Outcome: Resolved/Met  Goal: Respiratory  Outcome: Resolved/Met  Goal: Treatments/Interventions/Procedures  Outcome: Resolved/Met  Goal: Psychosocial  Outcome: Resolved/Met  Goal: *Procedure site is without bleeding and signs of infection six hours post sheath removal  Outcome: Resolved/Met  Goal: *Hemodynamically stable  Outcome: Resolved/Met  Goal: *Optimal pain control at patient's stated goal  Outcome: Resolved/Met

## 2019-09-12 NOTE — INTERVAL H&P NOTE
H&P Update:  Kylie Rather was seen and examined. History and physical has been reviewed. The patient has been examined.  There have been no significant clinical changes since the completion of the originally dated History and Physical.

## 2019-09-12 NOTE — Clinical Note
Lesion located in the Proximal RCA. Balloon inflated using multiple inflations inflation technique. Pressure = 8 david; Duration = 10 sec. Inflation 2: Pressure: 8 david; Duration: 8 sec.

## 2019-09-12 NOTE — Clinical Note
Lesion located in the Proximal RCA. Balloon balloon re-inflated. Pressure = 14 david; Duration = 28 sec.

## 2019-09-12 NOTE — ACP (ADVANCE CARE PLANNING)
made initial visit to patients room on IV[facility] for in basket request to AMD consult. Patient was off the floor in the Cath Lab att his time.  left a copy of AMD in room along with the pamphlet Your Right to decide.  was unable to access the patients needs at this time. Spiritual Care will follow up as needed.     Ryan Lewis MDiv  Pager: [phone]

## 2019-09-12 NOTE — Clinical Note
Lesion located in the Mid RCA. Balloon inflated using multiple inflations inflation technique. Pressure = 8 david; Duration = 38 sec.

## 2019-09-12 NOTE — Clinical Note
Lesion located in the Proximal RCA. Balloon inserted. Balloon inflated using single inflation technique. Pressure = 12 david; Duration = 22 sec.

## 2019-09-12 NOTE — Clinical Note
Lesion: Located in the Proximal RCA. Stent inserted. Stent deployed. First inflation pressure = 16 david; inflation time: 20 sec.

## 2019-09-12 NOTE — Clinical Note
RG3 INSERTED INTO THE 6 FR LFA- EBU / CORSAIR AND THREADED OUT THE GUIDELINER / AL1 OUT THE 7 FR SHEATH (RFA)

## 2019-09-12 NOTE — Clinical Note
Lesion: Located in the R PDA. Stent inserted. Stent deployed. Single technique used. First inflation pressure = 9 david; inflation time: 10 sec.

## 2019-09-12 NOTE — PROGRESS NOTES
Spiritual Care Assessment/Progress Note  [facility]      NAME: Daniel Quinonez      MRN: [id number]  AGE: 59 y.o. SEX: male  Orthodoxy Affiliation: Episcopal   Language: English     9/12/2019     Total Time (in minutes): 10     Spiritual Assessment begun in [facility] CATH LAB through conversation with:         []Patient        [] Family    [] Friend(s)        Reason for Consult: Advance medical directive consult     Spiritual beliefs: (Please include comment if needed)     [] Identifies with a dre tradition:         [] Supported by a dre community:            [] Claims no spiritual orientation:           [] Seeking spiritual identity:                [] Adheres to an individual form of spirituality:           [x] Not able to assess:                           Identified resources for coping:      [] Prayer                               [] Music                  [] Guided Imagery     [] Family/friends                 [] Pet visits     [] Devotional reading                         [x] Unknown     [] Other:                                               Interventions offered during this visit: (See comments for more details)    Patient Interventions: Advance medical directive consult           Plan of Care:     [x] Support spiritual and/or cultural needs    [] Support AMD and/or advance care planning process      [] Support grieving process   [] Coordinate Rites and/or Rituals    [] Coordination with community clergy   [] No spiritual needs identified at this time   [] Detailed Plan of Care below (See Comments)  [] Make referral to Music Therapy  [] Make referral to Pet Therapy     [] Make referral to Addiction services  [] Make referral to [facility]  [] Make referral to Spiritual Care Partner  [] No future visits requested        [x] Follow up visits as needed     Comments:  made initial visit to patients room on IV[facility] for in basket request to AMD consult.  Patient was off the floor in the Cath Lab att his time.  left a copy of AMD in room along with the pamphlet Your Right to decide.  was unable to access the patients needs at this time. Spiritual Care will follow up as needed.     Yani Gonsales MDiv  Pager: [phone]

## 2019-09-12 NOTE — Clinical Note
Lesion: Located in the R PDA. Re-inflated stent balloon used. First inflation pressure = 12 david; inflation time: 8 sec.

## 2019-09-13 VITALS
BODY MASS INDEX: 32.48 KG/M2 | TEMPERATURE: 97.9 F | WEIGHT: 232 LBS | RESPIRATION RATE: 16 BRPM | SYSTOLIC BLOOD PRESSURE: 141 MMHG | HEIGHT: 71 IN | DIASTOLIC BLOOD PRESSURE: 90 MMHG | OXYGEN SATURATION: 98 % | HEART RATE: 82 BPM

## 2019-09-13 LAB
ANION GAP SERPL CALC-SCNC: 6 MMOL/L (ref 5–15)
ATRIAL RATE: 76 BPM
BUN SERPL-MCNC: 14 MG/DL (ref 6–20)
BUN/CREAT SERPL: 13 (ref 12–20)
CALCIUM SERPL-MCNC: 8.7 MG/DL (ref 8.5–10.1)
CALCULATED P AXIS, ECG09: 68 DEGREES
CALCULATED R AXIS, ECG10: 57 DEGREES
CALCULATED T AXIS, ECG11: 57 DEGREES
CHLORIDE SERPL-SCNC: 108 MMOL/L (ref 97–108)
CO2 SERPL-SCNC: 24 MMOL/L (ref 21–32)
CREAT SERPL-MCNC: 1.09 MG/DL (ref 0.7–1.3)
DIAGNOSIS, 93000: NORMAL
GLUCOSE SERPL-MCNC: 104 MG/DL (ref 65–100)
P-R INTERVAL, ECG05: 174 MS
POTASSIUM SERPL-SCNC: 3.9 MMOL/L (ref 3.5–5.1)
Q-T INTERVAL, ECG07: 368 MS
QRS DURATION, ECG06: 90 MS
QTC CALCULATION (BEZET), ECG08: 414 MS
SODIUM SERPL-SCNC: 138 MMOL/L (ref 136–145)
VENTRICULAR RATE, ECG03: 76 BPM

## 2019-09-13 PROCEDURE — 80048 BASIC METABOLIC PNL TOTAL CA: CPT

## 2019-09-13 PROCEDURE — 74011250637 HC RX REV CODE- 250/637: Performed by: NURSE PRACTITIONER

## 2019-09-13 PROCEDURE — 93005 ELECTROCARDIOGRAM TRACING: CPT

## 2019-09-13 PROCEDURE — 36415 COLL VENOUS BLD VENIPUNCTURE: CPT

## 2019-09-13 RX ADMIN — PANTOPRAZOLE SODIUM 40 MG: 40 TABLET, DELAYED RELEASE ORAL at 09:27

## 2019-09-13 RX ADMIN — Medication 10 ML: at 05:40

## 2019-09-13 RX ADMIN — CLOPIDOGREL BISULFATE 75 MG: 75 TABLET ORAL at 09:27

## 2019-09-13 NOTE — PROGRESS NOTES
[address] [place] [address]  [phone]      Cardiology Progress Note      9/13/2019 9:45 AM    Admit Date: 9/12/2019    Admit Diagnosis:   Athscl heart disease of native cor art w [facility] ang pctrs ([address] [I25.118]    Interval History/Subjective:     Sarah Phan is a 59 y.o. male who is s/p elective cardiac cath with stenting    -VSS  -Labs stable  -Mr. Mela Garza is feeling very good today. He state he can walk down the hallway without SOB and that this is new for him. No chest pain or palpitations. Some slight soreness at his groin sites. Visit Vitals  /90 (BP 1 Location: Left arm, BP Patient Position: At rest)   Pulse 82   Temp 97.9 °F (36.6 °C)   Resp 16   Ht 5' 11\" (1.803 m)   Wt 105.2 kg (232 lb)   SpO2 98%   BMI 32.36 kg/m²       Current Facility-Administered Medications   Medication Dose Route Frequency    clopidogrel (PLAVIX) tablet 75 mg  75 mg Oral DAILY    pantoprazole (PROTONIX) tablet 40 mg  40 mg Oral DAILY    nebivolol (BYSTOLIC) tablet 2.5 mg  2.5 mg Oral QHS    sodium chloride (NS) flush 5-40 mL  5-40 mL IntraVENous Q8H    sodium chloride (NS) flush 5-40 mL  5-40 mL IntraVENous PRN    acetaminophen (TYLENOL) tablet 650 mg  650 mg Oral Q4H PRN    amLODIPine (NORVASC) tablet 2.5 mg  2.5 mg Oral QHS    aspirin delayed-release tablet 81 mg  81 mg Oral QHS       Objective:      Physical Exam:  General Appearance:  pleasant adult  male sitting in chair in NAD  Chest:   Clear  Cardiovascular:  Regular rate and rhythm, no murmur. Abdomen:   Soft, non-tender, bowel sounds are active. Extremities: palpable distal pulses; no edema   Skin:  Warm and dry. bilateral groin sites CDI without swelling or bruit. Very slight bruising     Data Review:   No results for input(s): WBC, HGB, HCT, PLT, HGBEXT, HCTEXT, PLTEXT in the last 72 hours.   Recent Labs     09/13/19  0450      K 3.9      CO2 24   *   BUN 14   CREA 1.09   CA 8.7       No results for input(s): TROIQ, CPK, CKMB in the last 72 hours. Intake/Output Summary (Last 24 hours) at 9/13/2019 0960  Last data filed at 9/13/2019 0737  Gross per 24 hour   Intake 490 ml   Output --   Net 490 ml        Telemetry: SR  EKG: NSR    Assessment:     Active Problems:    S/P cardiac cath (8/1/2019)      Overview: 8/1/19: PCI Cx      9/12/19:  Successful opening of       Statin intolerance (9/12/2019)        Plan:       Lashae Martinez is recovering post-procedure. Bilateral groin site dressing is CDI without swelling or bleeding or bruit. VSS. Rhythm sinus. Lashae Martinze denies complaints at this time. If recovery continues to progress without complication, discharge is planned for later today. Patient will follow up with Dr. Bakari Diamond for continued cardiac care. ASA, BB, plavix. Statin intolerance.             Anshul Gonzalez NP  DNP, RN, AGACNP-BC

## 2019-09-13 NOTE — PROGRESS NOTES
Bedside report received from Daphney Buckner RN                   Assessment, Background, Procedure summary, Intake/Output, MAR, and recent results discussed. Care assumed. Pt ambulated in hallway. Pt appears steady and has no complaints of pain, shortness of breath, dizziness, or light-headedness. Incision appears clean, dry, and intact with no swelling or hematoma present. Discharge instructions reviewed with patient and family. Allowed adequate time to ask questions, all questions answered. Printed copy of AVS given to patient. All belongings gathered, IV and tele discontinued. Transported via wheelchair by volunteer to main entrance and into care of family.

## 2019-09-13 NOTE — DISCHARGE INSTRUCTIONS
Cardiology Discharge Instructions             [address]  [phone]      Patient ID:  Yara Burgos  [id number]  37 y.o.  1955    Admit Date: 9/12/2019    Discharge Date: 9/13/2019     Admitting Physician: Celia Bertrand MD     Discharge Physician: Ruperto Carty NP    Admission Diagnoses:   Athscl heart disease of native cor art w oth ang [facility]) [I25.118]    Discharge Diagnoses: Active Problems:    S/P cardiac cath (8/1/2019)      Overview: 8/1/19: PCI Cx      9/12/19:  Successful opening of       Statin intolerance (9/12/2019)        Discharge Condition: Good    Cardiology Procedures this Admission:  Left heart catheterization with PCI    Disposition: home    Reference discharge instructions provided by nursing for diet and activity. Signed:    9/13/2019  9:44 AM      CARDIAC CATHETERIZATION/PCI DISCHARGE INSTRUCTIONS    It is normal to feel tired the first couple days. Take it easy and follow the physicians instructions. CHECK THE CATHETER INSERTION SITE DAILY:    You may shower 24 hours after the procedure, remove the bandage during showering. Wash with soap and water and pat dry. Gentle cleaning of the site with soap and water is sufficient, cover with a dry clean dressing or bandage. Do not apply creams or powders to the area. Do not sit in a bathtub or pool of water for 7 days or until wound has completely healed. Temporary bruising and discomfort is normal and may last a few weeks. You may have a  formation of a small lump at the site which may last up to 6 weeks. CALL THE PHYSICIANS:    If the site becomes red, swollen or feels warm to the touch  If there is bleeding or drainage or if there is unusual pain at the groin or down the leg. If there is any bleeding, lie down, apply pressure or have someone apply pressure with a clean cloth until the bleeding stops.    If the bleeding continues, call 911 to be transported to the hospital.  DO NOT DRIVE YOURSELF, OR HAVE ANYONE ELSE DRIVE YOU - CALL [phone]. ACTIVITY:    For the first 24-48 hours or as instructed by the physician:  No lifting, pushing or pulling over 10 pounds and no straining the insertion site. Do not life grocery bags or the garbage can, do not run the vacuum  or  for 7 days. Start with short walks as in the hospital and gradually increase as tolerated each day. It is recommended to walk 30 minutes 5-7 days per week. Follow your physicians instructions on activity. Avoid walking outside in extremes of heat or cold. Walk inside when it is cold and windy or hot and humid. Things to keep in mind:  No driving for at least 24 hours, or as designated by your physician. Limit the number of times you go up and down the stairs  Take rests and pace yourself with activity. Be careful and do not strain with bowel movements. MEDICATIONS:    Take all medications as prescribed  Call your physician if you have any questions  Keep an updated list of your medications with you at all times and give a list to your physician and pharmacist        SIGNS AND SYMPTOMS:    Be cautious of symptoms of angina or recurrent symptoms such as chest discomfort, unusual shortness of breath or fatigue. These could be symptoms of restenosis, a new blockage or a heart attack. If your symptoms are relieved with rest it is still recommended that you notify your physician of recurrent chest pain or discomfort. FOR CHEST PAIN or symptoms of angina not relieved with rest:  If the discomfort is not relieved with rest, and you have been prescribed Nitroglycerin, take as directed (taken under the tongue, one at a time 5 minutes apart for a total of 3 doses). If the discomfort is not relieved after the 3rd nitroglycerin, call 911. If you have not been prescribed Nitroglycerin  and your chest discomfort is not relieved with rest, call 911.      AFTER CARE:    Follow up with your physician as instructed. Follow a heart healthy diet with proper portion control, daily stress management, daily exercise, blood pressure and cholesterol control , and smoking cessation.

## 2019-09-16 ENCOUNTER — TELEPHONE (OUTPATIENT)
Dept: CARDIOLOGY CLINIC | Age: 64
End: 2019-09-16

## 2019-09-16 NOTE — TELEPHONE ENCOUNTER
Returned spouse call, 2 pt identifiers used    Per spouse appointment with Dr. Senthil Zarate has been cancelled and rescheduled with WALE Jasmine for cath f/u.     Future Appointments   Date Time Provider Dutch George   9/26/2019  2:40 PM Nicolasa Au  [address] 12/9/2019  2:20 PM Brittani De La Vega  [address]

## 2019-09-16 NOTE — TELEPHONE ENCOUNTER
Returned spouse call, 2 pt identifiers used    She states patient had his second cath with Dr. Dean Man and was able to open up blocked area. She wanted to discuss Amlodipine causing pain. Scheduled patient to f/u with WALE Spivey this week to discuss medication changes. But patient also has a f/u with Dr. Dean Man and she will call his office to see if that appt is needed. She will call back with an update.     Future Appointments   Date Time Provider Dutch George   9/19/2019  2:20 PM Isha Rausch  [address]   9/30/2019  3:00 PM Tyree Wick MD [facility] [facility]   12/9/2019  2:20 PM Erik Washington  [address]

## 2019-09-16 NOTE — TELEPHONE ENCOUNTER
Patient's wife is calling to speak to you about patient's recent cath and his medications.      Phone: [phone]

## 2019-09-24 LAB
25(OH)D3+25(OH)D2 SERPL-MCNC: 34.1 NG/ML (ref 30–100)
ALBUMIN SERPL-MCNC: 4.2 G/DL (ref 3.6–4.8)
ALBUMIN/GLOB SERPL: 1.6 {RATIO} (ref 1.2–2.2)
ALP SERPL-CCNC: 84 IU/L (ref 39–117)
ALT SERPL-CCNC: 25 IU/L (ref 0–44)
AST SERPL-CCNC: 22 IU/L (ref 0–40)
BILIRUB SERPL-MCNC: <0.2 MG/DL (ref 0–1.2)
BUN SERPL-MCNC: 12 MG/DL (ref 8–27)
BUN/CREAT SERPL: 14 (ref 10–24)
CALCIUM SERPL-MCNC: 8.9 MG/DL (ref 8.6–10.2)
CHLORIDE SERPL-SCNC: 108 MMOL/L (ref 96–106)
CHOLEST SERPL-MCNC: 165 MG/DL (ref 100–199)
CO2 SERPL-SCNC: 21 MMOL/L (ref 20–29)
CREAT SERPL-MCNC: 0.87 MG/DL (ref 0.76–1.27)
ERYTHROCYTE [DISTWIDTH] IN BLOOD BY AUTOMATED COUNT: 13.5 % (ref 12.3–15.4)
GLOBULIN SER CALC-MCNC: 2.7 G/DL (ref 1.5–4.5)
GLUCOSE SERPL-MCNC: 102 MG/DL (ref 65–99)
HCT VFR BLD AUTO: 37.9 % (ref 37.5–51)
HDLC SERPL-MCNC: 26 MG/DL
HGB BLD-MCNC: 12.7 G/DL (ref 13–17.7)
INTERPRETATION, 910389: NORMAL
LDLC SERPL CALC-MCNC: ABNORMAL MG/DL (ref 0–99)
MCH RBC QN AUTO: 30.7 PG (ref 26.6–33)
MCHC RBC AUTO-ENTMCNC: 33.5 G/DL (ref 31.5–35.7)
MCV RBC AUTO: 92 FL (ref 79–97)
PLATELET # BLD AUTO: 272 X10E3/UL (ref 150–450)
POTASSIUM SERPL-SCNC: 4.5 MMOL/L (ref 3.5–5.2)
PROT SERPL-MCNC: 6.9 G/DL (ref 6–8.5)
RBC # BLD AUTO: 4.14 X10E6/UL (ref 4.14–5.8)
SODIUM SERPL-SCNC: 145 MMOL/L (ref 134–144)
TRIGL SERPL-MCNC: 616 MG/DL (ref 0–149)
VLDLC SERPL CALC-MCNC: ABNORMAL MG/DL (ref 5–40)
WBC # BLD AUTO: 7 X10E3/UL (ref 3.4–10.8)

## 2019-09-26 ENCOUNTER — OFFICE VISIT (OUTPATIENT)
Dept: CARDIOLOGY CLINIC | Age: 64
End: 2019-09-26

## 2019-09-26 VITALS
HEART RATE: 76 BPM | DIASTOLIC BLOOD PRESSURE: 82 MMHG | HEIGHT: 71 IN | RESPIRATION RATE: 17 BRPM | SYSTOLIC BLOOD PRESSURE: 128 MMHG | OXYGEN SATURATION: 97 % | BODY MASS INDEX: 31.36 KG/M2 | WEIGHT: 224 LBS

## 2019-09-26 DIAGNOSIS — I25.10 CORONARY ARTERY DISEASE DUE TO LIPID RICH PLAQUE: ICD-10-CM

## 2019-09-26 DIAGNOSIS — I25.83 CORONARY ARTERY DISEASE DUE TO LIPID RICH PLAQUE: ICD-10-CM

## 2019-09-26 DIAGNOSIS — E78.5 DYSLIPIDEMIA: ICD-10-CM

## 2019-09-26 DIAGNOSIS — I25.118 CORONARY ARTERY DISEASE OF NATIVE ARTERY OF NATIVE HEART WITH STABLE ANGINA PECTORIS (HCC): ICD-10-CM

## 2019-09-26 DIAGNOSIS — E78.1 HYPERTRIGLYCERIDEMIA: ICD-10-CM

## 2019-09-26 DIAGNOSIS — Z98.890 S/P CARDIAC CATH: ICD-10-CM

## 2019-09-26 DIAGNOSIS — E78.49 FAMILIAL HYPERLIPIDEMIA, HIGH LDL: Primary | ICD-10-CM

## 2019-09-26 DIAGNOSIS — R06.02 SHORTNESS OF BREATH: ICD-10-CM

## 2019-09-26 DIAGNOSIS — I24.0 RCA OCCLUSION (HCC): ICD-10-CM

## 2019-09-26 DIAGNOSIS — I21.4 NON-ST ELEVATION (NSTEMI) MYOCARDIAL INFARCTION (HCC): ICD-10-CM

## 2019-09-26 DIAGNOSIS — R07.9 CHEST PAIN, UNSPECIFIED TYPE: ICD-10-CM

## 2019-09-26 DIAGNOSIS — I10 HYPERTENSION, UNSPECIFIED TYPE: ICD-10-CM

## 2019-09-26 DIAGNOSIS — I24.9 ACS (ACUTE CORONARY SYNDROME) (HCC): ICD-10-CM

## 2019-09-26 DIAGNOSIS — E55.9 VITAMIN D DEFICIENCY: ICD-10-CM

## 2019-09-26 DIAGNOSIS — I10 ESSENTIAL HYPERTENSION: ICD-10-CM

## 2019-09-26 RX ORDER — CHOLECALCIFEROL (VITAMIN D3) 125 MCG
1 CAPSULE ORAL DAILY
COMMUNITY

## 2019-09-26 RX ORDER — ICOSAPENT ETHYL 1000 MG/1
1 CAPSULE ORAL 2 TIMES DAILY WITH MEALS
Qty: 180 CAP | Refills: 3 | Status: SHIPPED | OUTPATIENT
Start: 2019-09-26 | End: 2019-11-19 | Stop reason: SDUPTHER

## 2019-09-26 RX ORDER — NITROGLYCERIN 0.4 MG/1
0.4 TABLET SUBLINGUAL
Qty: 1 BOTTLE | Refills: 6 | Status: SHIPPED | OUTPATIENT
Start: 2019-09-26 | End: 2021-03-08 | Stop reason: SDUPTHER

## 2019-09-26 NOTE — PROGRESS NOTES
[place]: [facility]  [phone]  Seen by Dr. Emily Westfall in the past     HPI: Mesha Orellana, a 59y.o. year-old who presents for follow up on CAD with  RCA. Since his last visit here he had PCI with Ceiclia Alanis to the Lcx and his RCA . Still has a dull achy pressure in the chest. Comes and goe not clear if it is better or not. The leg swelling is better. Toes feel better. Breathing is a little better. Color is better, wife feels that he is doing better, sleeping better. He really looks better to me today. He admits he is not 100% but he is feeling a lot better. Needs to increase his activity. Cardiac rehab is not an option given that he lives far away. Not taking any aleve now the legs are feeling so much better. (had kodi taking 6 aleve a day)   Not taking TG SL tabs PRN chest pain, doesn't like to take it because it gives him a headache  He has not taken it in the last year  Having dyspnea with exertion too, worse than before  Feeling tired, no energy  Reviewed medical management strategies tried in the past   He has been having more swelling  Hx of an episode of thinking he had a CVA- could not speak, could not move, reversed with aspirin. He is a mechanical contractor, working hot and cold all the time      Trying to cut down his sodas, sweets,bread, reviewed with the high TG. Assessment/Plan:  1. CAD with worsening angina/SPRING -   -treatment failure with Ranexa, had headaches with Imdur, previously treated with diltiazem, insufficient relief, fatigue  -changed from Toprol XL to bystolic for fatigue, will reduce bystolic to 3.7YA daily today for fatigue and begin amlodipine 2.5mg daily for angina  -he is not taking L-arginine   -s/p  opening with Dr. Rivera Back 9/19  2. HTN - at goal on current regimen   3.  Dyslipidemia- Lipids 1/19 - , , LDL direct 118, HDL 35, not at goal  -continue repatha, will check fasting lipids now  -TG > 600 now the past, previously improved with repatha, if not getting to goal will add vascepa  -has tried zetia, niacin, statins, fenofibrate  -failed pravastatin, simvastatin, rosuvastatin, fluvastatin - see records scanned from pharmacy regarding trials of meds. -could not take livalo due to nausea and severe leg cramps. 4. LANI- betamethasone to nose/forehead  5. Vitamin D deficiency - will check Vitamin D level     Cath 10/16 LM ok LAD ok D1 ok D2 ok LCx mod 40-50% diseae prox. Lg RCA with large PDA/PBL filling form L-R collaterals  WILD ok in 2015  Cath 6/11 LM ok LAD 10% LCx 10% RCA mod size mid occlusion, with L-R collaterals  Stress 10/09 EF 43% inferior infarct, ischemia at [facility]    Soc no tob no etoh  FHX hx cad, htn dad with Mi at 36 and had homozygous FH, mom with RA. He  has a past medical history of CAD (coronary artery disease), GERD (gastroesophageal reflux disease), Hypercholesterolemia, Hypertension, and Ill-defined condition. Cardiovascular ROS: positive for - chest pain and dyspnea on exertion  Respiratory ROS: positive for - shortness of breath  Neurological ROS: no TIA or stroke symptoms  All other systems negative except as above. PE  Vitals:    09/26/19 1216   BP: 128/82   Pulse: 76   Resp: 17   SpO2: 97%   Weight: 224 lb (101.6 kg)   Height: 5' 11\" (1.803 m)    Body mass index is 31.24 kg/m².    General appearance - alert, well appearing, and in no distress  Mental status - affect appropriate to mood  Eyes - sclera anicteric, moist mucous membranes  Neck - supple, no carotid bruits   Lymphatics - not assessed   Chest - clear to auscultation, no wheezes, rales or rhonchi  Heart - normal rate, regular rhythm, normal S1, S2, no murmurs, rubs, clicks or gallops  Abdomen - soft, nontender, nondistended  Back exam - full range of motion, no tenderness  Neurological - cranial nerves II through XII grossly intact, no focal deficit  Musculoskeletal - no muscular tenderness noted, normal strength  Extremities - peripheral pulses normal, no pedal edema  Skin - normal coloration  no rashes    Recent Labs:  Lab Results   Component Value Date/Time    Cholesterol, total 165 09/21/2019 12:00 AM    HDL Cholesterol 26 (L) 09/21/2019 12:00 AM    LDL,Direct 118 (H) 01/08/2019 12:00 AM    LDL, calculated Comment 09/21/2019 12:00 AM    Triglyceride 616 (HH) 09/21/2019 12:00 AM     Lab Results   Component Value Date/Time    Creatinine 0.87 09/21/2019 12:00 AM     Lab Results   Component Value Date/Time    BUN 12 09/21/2019 12:00 AM     Lab Results   Component Value Date/Time    Potassium 4.5 09/21/2019 12:00 AM     Lab Results   Component Value Date/Time    Hemoglobin A1c 5.9 (H) 10/25/2016 08:10 AM     Lab Results   Component Value Date/Time    HGB 12.7 (L) 09/21/2019 12:00 AM     Lab Results   Component Value Date/Time    PLATELET 536 35/33/9272 12:00 AM       Reviewed:  Past Medical History:   Diagnosis Date    CAD (coronary artery disease)     GERD (gastroesophageal reflux disease)     Hypercholesterolemia     Hypertension     Ill-defined condition     [facility]     Social History     Tobacco Use   Smoking Status Former Smoker    Types: Cigarettes   Smokeless Tobacco Never Used   Tobacco Comment    quit 40 years ago     Social History     Substance and Sexual Activity   Alcohol Use Yes    Comment: occasional     Allergies   Allergen Reactions    Tetanus-Diphtheria Toxoids-Td Hives     Other reaction(s): red spot at injection site    Pcn [Penicillins] Other (comments)     Therapy ineffective         Current Outpatient Medications   Medication Sig    cholecalciferol, vitamin D3, (VITAMIN D3) 2,000 unit tab Take 1 Tab by mouth daily.  icosapent ethyl (VASCEPA) 1 gram capsule Take 1 Cap by mouth two (2) times daily (with meals).  nitroglycerin (NITROSTAT) 0.4 mg SL tablet 1 Tab by SubLINGual route every five (5) minutes as needed for Chest Pain.     clotrimazole-betamethasone (LOTRISONE) topical cream APPLY TO AFFECTED AREA DAILY AS NEEDED  clopidogrel (PLAVIX) 75 mg tab Take 1 Tab by mouth daily. Indications: blood clot prevention following percutaneous coronary intervention    evolocumab (REPATHA SYRINGE) syringe 1 mL by SubCUTAneous route every fourteen (14) days.  naproxen sodium (ALEVE) 220 mg cap Take 1-2 Caps by mouth as needed.  nebivolol (BYSTOLIC) 2.5 mg tablet Take 1 Tab by mouth nightly.  esomeprazole (NEXIUM) 40 mg capsule Take 40 mg by mouth daily as needed.  amLODIPine (NORVASC) 2.5 mg tablet Take 1 Tab by mouth daily.  lansoprazole (PREVACID) 15 mg capsule Take 1 Cap by mouth Daily (before breakfast). (Patient taking differently: Take 15 mg by mouth as needed.)    aspirin delayed-release 81 mg tablet Take  by mouth daily. No current facility-administered medications for this visit.         Octavio Child MD  [facility] heart and [facility] [place]  [address] [facility]  [address]

## 2019-10-29 ENCOUNTER — TELEPHONE (OUTPATIENT)
Dept: CARDIOLOGY CLINIC | Age: 64
End: 2019-10-29

## 2019-10-29 NOTE — TELEPHONE ENCOUNTER
Patients wife, Sandra Meza, is filling out an application for repatha and she has a few questions. She would also like to let you know that's he will be faxing paperwork to you. Thanks.      [phone]

## 2019-10-30 NOTE — TELEPHONE ENCOUNTER
Returned spouse call, 2 pt identifiers used    Advised spouse I did receive her fax. Will have provider complete and sign next week when she returns to the office.

## 2019-11-01 ENCOUNTER — TELEPHONE (OUTPATIENT)
Dept: CARDIOLOGY CLINIC | Age: 64
End: 2019-11-01

## 2019-11-01 NOTE — TELEPHONE ENCOUNTER
Patient's wife called needing to speak with Brandy Mcnair about not having a discount card for [address] for June. She can be reached at [phone].     Thanks~    Ps she wanted me to send the message to Brandy Mcnair for her to have when she returns

## 2019-11-12 ENCOUNTER — TELEPHONE (OUTPATIENT)
Dept: CARDIOLOGY CLINIC | Age: 64
End: 2019-11-12

## 2019-11-19 RX ORDER — ICOSAPENT ETHYL 1000 MG/1
1 CAPSULE ORAL 2 TIMES DAILY WITH MEALS
Qty: 180 CAP | Refills: 3 | Status: SHIPPED | OUTPATIENT
Start: 2019-11-19 | End: 2019-12-20 | Stop reason: SDUPTHER

## 2019-11-19 NOTE — TELEPHONE ENCOUNTER
LVM for patient to return call at earliest convenience. Patient does need lab work completed prior to appointment. Lab slip mailed to patient. All paperwork for P.A.T. For Lori Garzon completed and faxed. Requested Prescriptions     Signed Prescriptions Disp Refills    icosapent ethyl (VASCEPA) 1 gram capsule 180 Cap 3     Sig: Take 1 Cap by mouth two (2) times daily (with meals). Authorizing Provider: Seth Nelson     Ordering User: Roma Tran     Per verbal orders      Spouse returned call, 2 pt identifiers used    Above message given.

## 2019-11-19 NOTE — TELEPHONE ENCOUNTER
Repatha paper work for patient assistance has been completed and faxed. Confirmation received.      Brianne Sheffield is approved good Through 11/18/20, Auth # [id number]    Quentin Guerra is also approved good through 11/18/20, Auth # [id number]

## 2019-12-02 ENCOUNTER — TELEPHONE (OUTPATIENT)
Dept: CARDIOLOGY CLINIC | Age: 64
End: 2019-12-02

## 2019-12-02 NOTE — TELEPHONE ENCOUNTER
Returned spouse call, 2 pt identifiers used    Advised spouse a prior [place] has been received for Repatha. Good through 11/19/19-11/18/20. Faxed a copy of approval notice to spouse at [phone].

## 2019-12-04 LAB
ALBUMIN SERPL-MCNC: 4.8 G/DL (ref 3.6–4.8)
ALBUMIN/GLOB SERPL: 1.8 {RATIO} (ref 1.2–2.2)
ALP SERPL-CCNC: 87 IU/L (ref 39–117)
ALT SERPL-CCNC: 31 IU/L (ref 0–44)
AST SERPL-CCNC: 24 IU/L (ref 0–40)
BILIRUB SERPL-MCNC: 0.3 MG/DL (ref 0–1.2)
BUN SERPL-MCNC: 19 MG/DL (ref 8–27)
BUN/CREAT SERPL: 17 (ref 10–24)
CALCIUM SERPL-MCNC: 9.6 MG/DL (ref 8.6–10.2)
CHLORIDE SERPL-SCNC: 101 MMOL/L (ref 96–106)
CHOLEST SERPL-MCNC: 179 MG/DL (ref 100–199)
CO2 SERPL-SCNC: 22 MMOL/L (ref 20–29)
CREAT SERPL-MCNC: 1.09 MG/DL (ref 0.76–1.27)
ERYTHROCYTE [DISTWIDTH] IN BLOOD BY AUTOMATED COUNT: 13.5 % (ref 12.3–15.4)
GLOBULIN SER CALC-MCNC: 2.7 G/DL (ref 1.5–4.5)
GLUCOSE SERPL-MCNC: 102 MG/DL (ref 65–99)
HCT VFR BLD AUTO: 45.2 % (ref 37.5–51)
HDLC SERPL-MCNC: 37 MG/DL
HGB BLD-MCNC: 14.9 G/DL (ref 13–17.7)
INTERPRETATION, 910389: NORMAL
LDLC SERPL CALC-MCNC: ABNORMAL MG/DL (ref 0–99)
MAGNESIUM SERPL-MCNC: 2.3 MG/DL (ref 1.6–2.3)
MCH RBC QN AUTO: 29.6 PG (ref 26.6–33)
MCHC RBC AUTO-ENTMCNC: 33 G/DL (ref 31.5–35.7)
MCV RBC AUTO: 90 FL (ref 79–97)
PDF IMAGE, 910387: NORMAL
PLATELET # BLD AUTO: 293 X10E3/UL (ref 150–450)
POTASSIUM SERPL-SCNC: 4.5 MMOL/L (ref 3.5–5.2)
PROT SERPL-MCNC: 7.5 G/DL (ref 6–8.5)
RBC # BLD AUTO: 5.03 X10E6/UL (ref 4.14–5.8)
SODIUM SERPL-SCNC: 138 MMOL/L (ref 134–144)
TRIGL SERPL-MCNC: 541 MG/DL (ref 0–149)
VLDLC SERPL CALC-MCNC: ABNORMAL MG/DL (ref 5–40)
WBC # BLD AUTO: 11.2 X10E3/UL (ref 3.4–10.8)

## 2019-12-09 ENCOUNTER — OFFICE VISIT (OUTPATIENT)
Dept: CARDIOLOGY CLINIC | Age: 64
End: 2019-12-09

## 2019-12-09 VITALS
RESPIRATION RATE: 14 BRPM | HEART RATE: 76 BPM | OXYGEN SATURATION: 98 % | BODY MASS INDEX: 31.36 KG/M2 | WEIGHT: 224 LBS | SYSTOLIC BLOOD PRESSURE: 136 MMHG | DIASTOLIC BLOOD PRESSURE: 84 MMHG | HEIGHT: 71 IN

## 2019-12-09 DIAGNOSIS — I10 HYPERTENSION, UNSPECIFIED TYPE: ICD-10-CM

## 2019-12-09 DIAGNOSIS — I24.0 RCA OCCLUSION (HCC): ICD-10-CM

## 2019-12-09 DIAGNOSIS — I10 ESSENTIAL HYPERTENSION: ICD-10-CM

## 2019-12-09 DIAGNOSIS — I25.118 CORONARY ARTERY DISEASE OF NATIVE ARTERY OF NATIVE HEART WITH STABLE ANGINA PECTORIS (HCC): Primary | ICD-10-CM

## 2019-12-09 DIAGNOSIS — Z98.890 S/P CARDIAC CATH: ICD-10-CM

## 2019-12-09 DIAGNOSIS — E78.5 DYSLIPIDEMIA: ICD-10-CM

## 2019-12-09 DIAGNOSIS — R06.02 SHORTNESS OF BREATH: ICD-10-CM

## 2019-12-09 DIAGNOSIS — E78.49 FAMILIAL HYPERLIPIDEMIA, HIGH LDL: ICD-10-CM

## 2019-12-09 DIAGNOSIS — I21.4 NON-ST ELEVATION (NSTEMI) MYOCARDIAL INFARCTION (HCC): ICD-10-CM

## 2019-12-09 DIAGNOSIS — E78.1 HYPERTRIGLYCERIDEMIA: ICD-10-CM

## 2019-12-09 DIAGNOSIS — I24.9 ACS (ACUTE CORONARY SYNDROME) (HCC): ICD-10-CM

## 2019-12-09 NOTE — PROGRESS NOTES
[facility] [place]: Afsaneh Francois  [phone]  Seen by Dr. Botello Monday in the past     HPI: Arelis Sullivan, a 59y.o. year-old who presents for follow up on CAD with  RCA. Since his last visit here he had PCI with Fei Alberto to the Lcx and his RCA . Still has a dull achy pressure in the chest. Comes and goes not clear if it is better or not. The leg swelling is better. Toes feel better. Breathing is a little better. Color is better, wife feels that he is doing better, sleeping better. He really looks better to me today. He admits he is not 100% but he is feeling a lot better. Needs to increase his activity. Cardiac rehab is not an option given that he lives far away. Not taking any aleve now the legs are feeling so much better. (had been taking 6 aleve a day) Legs are feeling much better. Can go out and work all day. Feels like they are not swelling any more  Not taking TG SL tabs PRN chest pain, doesn't like to take it because it gives him a headache  He has not taken it in the last year  Having dyspnea with exertion too, worse than before  Feeling tired, no energy  Reviewed medical management strategies tried in the past   He has been having more swelling  Hx of an episode of thinking he had a CVA- could not speak, could not move, reversed with aspirin. He is a mechanical contractor, working hot and cold all the time      Trying to cut down his sodas, sweets,bread, reviewed with the high TG. BP was going high, to the 180s and he just had some adjustments to the amlodipine to 10mg from 5mg daily. Watch for increased edema on the higher dose of amlodipine. Now taking curcumin supplement. TG remain high, 541, only on the vascepa 2 weeks. Next could add fenofibrate if needed. Assessment/Plan:  1.  CAD with worsening angina/SPRING -   -treatment failure with Ranexa, had headaches with Imdur, previously treated with diltiazem, insufficient relief, fatigue  -changed from Toprol XL to bystolic for fatigue, cont low dose bystolic and the amlodipine.   -he is not taking L-arginine   -s/p  opening with Dr. Wyatt Caballero 9/19  2. HTN - at goal on current regimen   3. Dyslipidemia- Lipids 1/19 - , , LDL direct 118, HDL 35, not at goal  -continue repatha, will check fasting lipids now  -TG > 600 now the past, previously improved with repatha, if not getting to goal will add vascepa  -has tried zetia, niacin, statins, fenofibrate  -failed pravastatin, simvastatin, rosuvastatin, fluvastatin - see records scanned from pharmacy regarding trials of meds. -could not take livalo due to nausea and severe leg cramps. 4. LANI- betamethasone to nose/forehead  5. Vitamin D deficiency - will check Vitamin D level     Cath 10/16 LM ok LAD ok D1 ok D2 ok LCx mod 40-50% diseae prox. Lg RCA with large PDA/PBL filling form L-R collaterals  WILD ok in 2015  Cath 6/11 LM ok LAD 10% LCx 10% RCA mod size mid occlusion, with L-R collaterals  Stress 10/09 EF 43% inferior infarct, ischemia at [facility]    Soc no tob no etoh  FHX hx cad, htn dad with Mi at 36 and had homozygous FH, mom with RA. He  has a past medical history of CAD (coronary artery disease), GERD (gastroesophageal reflux disease), Hypercholesterolemia, Hypertension, and Ill-defined condition. Cardiovascular ROS: positive for - chest pain and dyspnea on exertion  Respiratory ROS: positive for - shortness of breath  Neurological ROS: no TIA or stroke symptoms  All other systems negative except as above. PE  Vitals:    12/09/19 1433   BP: 136/84   Pulse: 76   Resp: 14   SpO2: 98%   Weight: 224 lb (101.6 kg)   Height: 5' 11\" (1.803 m)    Body mass index is 31.24 kg/m².    General appearance - alert, well appearing, and in no distress  Mental status - affect appropriate to mood  Eyes - sclera anicteric, moist mucous membranes  Neck - supple, no carotid bruits   Lymphatics - not assessed   Chest - clear to auscultation, no wheezes, rales or rhonchi  Heart - normal rate, regular rhythm, normal S1, S2, no murmurs, rubs, clicks or gallops  Abdomen - soft, nontender, nondistended  Back exam - full range of motion, no tenderness  Neurological - cranial nerves II through XII grossly intact, no focal deficit  Musculoskeletal - no muscular tenderness noted, normal strength  Extremities - peripheral pulses normal, no pedal edema  Skin - normal coloration  no rashes    Recent Labs:  Lab Results   Component Value Date/Time    Cholesterol, total 179 12/03/2019 12:00 AM    HDL Cholesterol 37 (L) 12/03/2019 12:00 AM    LDL,Direct 118 (H) 01/08/2019 12:00 AM    LDL, calculated Comment 12/03/2019 12:00 AM    Triglyceride 541 (H) 12/03/2019 12:00 AM     Lab Results   Component Value Date/Time    Creatinine 1.09 12/03/2019 12:00 AM     Lab Results   Component Value Date/Time    BUN 19 12/03/2019 12:00 AM     Lab Results   Component Value Date/Time    Potassium 4.5 12/03/2019 12:00 AM     Lab Results   Component Value Date/Time    Hemoglobin A1c 5.9 (H) 10/25/2016 08:10 AM     Lab Results   Component Value Date/Time    HGB 14.9 12/03/2019 12:00 AM     Lab Results   Component Value Date/Time    PLATELET 178 44/88/7921 12:00 AM       Reviewed:  Past Medical History:   Diagnosis Date    CAD (coronary artery disease)     GERD (gastroesophageal reflux disease)     Hypercholesterolemia     Hypertension     Ill-defined condition     Ishmaels     Social History     Tobacco Use   Smoking Status Former Smoker    Types: Cigarettes   Smokeless Tobacco Never Used   Tobacco Comment    quit 40 years ago     Social History     Substance and Sexual Activity   Alcohol Use Yes    Comment: occasional     Allergies   Allergen Reactions    Tetanus-Diphtheria Toxoids-Td Hives     Other reaction(s): red spot at injection site    Pcn [Penicillins] Other (comments)     Therapy ineffective         Current Outpatient Medications   Medication Sig    icosapent ethyl (VASCEPA) 1 gram capsule Take 1 Cap by mouth two (2) times daily (with meals).  clotrimazole-betamethasone (LOTRISONE) topical cream APPLY TO AFFECTED AREA DAILY AS NEEDED    triamcinolone acetonide (KENALOG) 0.1 % topical cream use thin layer bid prn 10 -14 days. on face    amLODIPine (NORVASC) 5 mg tablet Take 1 Tab by mouth daily. (Patient taking differently: Take 10 mg by mouth daily.)    cholecalciferol, vitamin D3, (VITAMIN D3) 2,000 unit tab Take 1 Tab by mouth daily.  nitroglycerin (NITROSTAT) 0.4 mg SL tablet 1 Tab by SubLINGual route every five (5) minutes as needed for Chest Pain.  clopidogrel (PLAVIX) 75 mg tab Take 1 Tab by mouth daily. Indications: blood clot prevention following percutaneous coronary intervention    evolocumab (REPATHA SYRINGE) syringe 1 mL by SubCUTAneous route every fourteen (14) days.  nebivolol (BYSTOLIC) 2.5 mg tablet Take 1 Tab by mouth nightly.  esomeprazole (NEXIUM) 40 mg capsule Take 40 mg by mouth daily as needed.  aspirin delayed-release 81 mg tablet Take  by mouth daily.  guaiFENesin-codeine (ROBITUSSIN AC) 100-10 mg/5 mL solution Take 5 mL by mouth three (3) times daily as needed for Cough for up to 8 days. Max Daily Amount: 15 mL.  naproxen sodium (ALEVE) 220 mg cap Take 1-2 Caps by mouth as needed.  lansoprazole (PREVACID) 15 mg capsule Take 1 Cap by mouth Daily (before breakfast). (Patient taking differently: Take 15 mg by mouth as needed.)     No current facility-administered medications for this visit.         Jeff Birch MD  [facility] heart and Vascular [place]  [address] [facility] [address]  [address]

## 2019-12-13 NOTE — PROGRESS NOTES
TG are still markedly elevated, please increase vascepa to 2000mg twice daily, continue repatha. Needs to cut way back on carbs and cut out sweets and sodas. Please ask him to have fasting lipids checked in 8 weeks. Rest of his labs are ok.

## 2019-12-20 ENCOUNTER — TELEPHONE (OUTPATIENT)
Dept: CARDIOLOGY CLINIC | Age: 64
End: 2019-12-20

## 2019-12-20 RX ORDER — ICOSAPENT ETHYL 1000 MG/1
2 CAPSULE ORAL 2 TIMES DAILY WITH MEALS
Qty: 360 CAP | Refills: 3 | Status: SHIPPED | OUTPATIENT
Start: 2019-12-20 | End: 2020-10-12 | Stop reason: SDUPTHER

## 2019-12-20 NOTE — TELEPHONE ENCOUNTER
----- Message from Forest Mtz NP sent at 12/13/2019 11:02 AM EST -----  TG are still markedly elevated, please increase vascepa to 2000mg twice daily, continue repatha. Needs to cut way back on carbs and cut out sweets and sodas. Please ask him to have fasting lipids checked in 8 weeks. Rest of his labs are ok. My chart message sent to patient.

## 2020-01-13 NOTE — TELEPHONE ENCOUNTER
Requested Prescriptions     Signed Prescriptions Disp Refills    evolocumab (REPATHA SURECLICK) pen injection 6 Pen 3     Si mL by SubCUTAneous route every fourteen (14) days.      Authorizing Provider: Oliver Solis     Ordering User: Sara Burks     Per verbal orders

## 2020-01-15 ENCOUNTER — TELEPHONE (OUTPATIENT)
Dept: CARDIOLOGY CLINIC | Age: 65
End: 2020-01-15

## 2020-01-15 NOTE — TELEPHONE ENCOUNTER
Patients wife states that patient wakes up everyday exhausted even if he had a full nights rest and that he finds it hard to breathe and feels this way for the first 3 or 4 hours of the day and than it lifts. Please advise.     Phone: [phone]

## 2020-01-15 NOTE — TELEPHONE ENCOUNTER
MD Patricia Hackett, RN   Caller: Unspecified (Today, 10:27 AM)             Have him try stoppng his bystolic      Returned call to spouse, 2 pt identifiers used  Above message given. He will stop Bystolic 2.5 mg and call back with an update in 1-2 weeks.

## 2020-01-20 ENCOUNTER — TELEPHONE (OUTPATIENT)
Dept: CARDIOLOGY CLINIC | Age: 65
End: 2020-01-20

## 2020-01-20 RX ORDER — VALSARTAN 80 MG/1
TABLET ORAL DAILY
COMMUNITY
End: 2020-01-20 | Stop reason: SDUPTHER

## 2020-01-20 RX ORDER — VALSARTAN 80 MG/1
80 TABLET ORAL DAILY
Qty: 30 TAB | Refills: 5 | Status: SHIPPED | OUTPATIENT
Start: 2020-01-20 | End: 2022-05-17 | Stop reason: ALTCHOICE

## 2020-01-20 NOTE — TELEPHONE ENCOUNTER
MD Shaneka Feliz, RN   Caller: Unspecified (Today, 10:09 AM)             Add valsartn 80mg      Returned call to spouse, 2 pt identifiers used    Advised spouse per Dr. Siva Gupta for patient to start Valsartan 80 mg every am. To check BP 1 hour after morning meds and call in 1 week with an update.

## 2020-01-20 NOTE — TELEPHONE ENCOUNTER
I spoke to spouse, 2 pt identifiers used    She states pharmacy did not receive script for Repatha. Verified pharmacy and fax number. Resent script. Requested Prescriptions     Signed Prescriptions Disp Refills    evolocumab (REPATHA SURECLICK) pen injection 6 Pen 3     Si mL by SubCUTAneous route every fourteen (14) days. Authorizing Provider: Alida Greenfield     Ordering User: Swapnil Duffy     Patient's BP is now elevated in the am when he wakes up. He take Norvasc 5 mg nightly. He does not feel as weak as when taking bystolic but does not feel good with elevated pressure.  Please advise

## 2020-01-20 NOTE — TELEPHONE ENCOUNTER
Patient's wife, Genesis Mabry, stated that the patient is still weak w/ a  heavy feeling in his chest and no energy. She stated that his bp was 157/97 this morning. Please advise.     Phone #: [phone]  Thanks

## 2020-01-22 ENCOUNTER — TELEPHONE (OUTPATIENT)
Dept: CARDIOLOGY CLINIC | Age: 65
End: 2020-01-22

## 2020-01-22 NOTE — TELEPHONE ENCOUNTER
Pt's wife called to give the phone of [phone] and fax [phone] for [address] [place] [address].     Pix[place]

## 2020-01-22 NOTE — TELEPHONE ENCOUNTER
Call placed to mail order pharmacy. [facility] no longer supply Repatha through mail order. It has to be supplied through a local pharmacy. E-scribed to Sydney Liu. Returned patient's spouse call and above message given. Requested Prescriptions     Signed Prescriptions Disp Refills    evolocumab (REPATHA SURECLICK) pen injection 6 Pen 3     Si mL by SubCUTAneous route every fourteen (14) days.      Authorizing Provider: Matti Dominguez     Ordering User: Nevaeh Hartman     Per verbal orders  2 pt identifiers used

## 2020-01-23 ENCOUNTER — TELEPHONE (OUTPATIENT)
Dept: CARDIOLOGY CLINIC | Age: 65
End: 2020-01-23

## 2020-01-23 NOTE — TELEPHONE ENCOUNTER
LVM for patient to return call at earliest convenience. Did insurance change? Prior auth on file until 11/18/20. Prior Aureliano Shark has been attempted, waiting on insurance determination.

## 2020-01-23 NOTE — TELEPHONE ENCOUNTER
Patient's wife, Diana Barraza, stated that [address] is requiring a prior auth for Lilia Venegas.      Phone #: [phone]  Thanks

## 2020-01-27 NOTE — TELEPHONE ENCOUNTER
Patients wife would like to lt you know that the Prior Auth was received and that the first shipment is due on 2/4.      Phone: [phone]

## 2020-01-27 NOTE — TELEPHONE ENCOUNTER
Returned spouse call, 2 pt identifiers used    Patient's insurance has not changed and prior [place] is still in place until 1/20. Shipment is on the way.

## 2020-03-04 ENCOUNTER — TELEPHONE (OUTPATIENT)
Dept: CARDIOLOGY CLINIC | Age: 65
End: 2020-03-04

## 2020-03-05 NOTE — TELEPHONE ENCOUNTER
Returned spouse call, 2 pt identifiers used    Advised Dr. Jessenia Shannon has reviewed patient's BP readings and advises: Off Bystolic, on Valsartan --just watch for now. This message given verbally and also faxed back to her with message on BP readings. This readings have been sent to scanning.

## 2020-03-23 ENCOUNTER — TELEPHONE (OUTPATIENT)
Dept: CARDIOLOGY CLINIC | Age: 65
End: 2020-03-23

## 2020-03-23 NOTE — TELEPHONE ENCOUNTER
Patient is calling as his legs are swelling, he is having pain in his legs and he has low energy. Calling to inquire if it is caused by the amlodipine he is taking. Please advise.      Phone: [phone]

## 2020-03-24 RX ORDER — AMLODIPINE BESYLATE 2.5 MG/1
TABLET ORAL DAILY
COMMUNITY
End: 2020-12-07 | Stop reason: SDUPTHER

## 2020-03-24 NOTE — TELEPHONE ENCOUNTER
Returned patient's spouse call, 2 pt identifiers used    Patient has been having some lower extremity swelling and feels low energy. He could not report any BP readings at this time. Per NP Patricia decreased Amlodipine 5 mg to 2.5 mg. Continue to monitor his BP 1 hour after morning meds and report readings in 1 week.

## 2020-06-09 DIAGNOSIS — I25.118 CORONARY ARTERY DISEASE OF NATIVE ARTERY OF NATIVE HEART WITH STABLE ANGINA PECTORIS (HCC): Primary | ICD-10-CM

## 2020-06-09 DIAGNOSIS — E78.1 HYPERTRIGLYCERIDEMIA: ICD-10-CM

## 2020-06-09 DIAGNOSIS — I10 ESSENTIAL HYPERTENSION: ICD-10-CM

## 2020-06-09 DIAGNOSIS — E78.49 FAMILIAL HYPERLIPIDEMIA, HIGH LDL: ICD-10-CM

## 2020-06-16 ENCOUNTER — VIRTUAL VISIT (OUTPATIENT)
Dept: CARDIOLOGY CLINIC | Age: 65
End: 2020-06-16

## 2020-06-16 DIAGNOSIS — R06.02 SHORTNESS OF BREATH: ICD-10-CM

## 2020-06-16 DIAGNOSIS — I10 HYPERTENSION, UNSPECIFIED TYPE: ICD-10-CM

## 2020-06-16 DIAGNOSIS — I24.9 ACS (ACUTE CORONARY SYNDROME) (HCC): ICD-10-CM

## 2020-06-16 DIAGNOSIS — I24.0 RCA OCCLUSION (HCC): ICD-10-CM

## 2020-06-16 DIAGNOSIS — E78.5 DYSLIPIDEMIA: ICD-10-CM

## 2020-06-16 DIAGNOSIS — I25.118 CORONARY ARTERY DISEASE OF NATIVE ARTERY OF NATIVE HEART WITH STABLE ANGINA PECTORIS (HCC): ICD-10-CM

## 2020-06-16 DIAGNOSIS — I21.4 NON-ST ELEVATION (NSTEMI) MYOCARDIAL INFARCTION (HCC): ICD-10-CM

## 2020-06-16 DIAGNOSIS — E78.1 HYPERTRIGLYCERIDEMIA: ICD-10-CM

## 2020-06-16 DIAGNOSIS — E78.49 FAMILIAL HYPERLIPIDEMIA, HIGH LDL: Primary | ICD-10-CM

## 2020-06-16 DIAGNOSIS — Z98.890 S/P CARDIAC CATH: ICD-10-CM

## 2020-06-16 DIAGNOSIS — E78.49 FAMILIAL HYPERLIPIDEMIA, HIGH LDL: ICD-10-CM

## 2020-06-16 DIAGNOSIS — I10 ESSENTIAL HYPERTENSION: ICD-10-CM

## 2020-06-16 RX ORDER — ACETAMINOPHEN 500 MG
1000 TABLET ORAL AS NEEDED
COMMUNITY

## 2020-06-16 NOTE — PROGRESS NOTES
VIRTUAL VISIT DOCUMENTATION     Pursuant to the emergency declaration under the [address] [facility] waiver authority and the [employer] Act, this Virtual  Visit was conducted, with patient's consent, to reduce the patient's risk of exposure to COVID-19 and provide continuity of care for an established patient. Services were provided through a video synchronous discussion virtually to substitute for in-person clinic visit. CHIEF COMPLAINT      Ale Shabazz is a 59 y.o. male who was seen by synchronous (real-time) audio-video technology on 6/16/2020. Patient is being seen today for CAD  RCA  Since last vsit hd trouble with exhaustion and hypotension, meds stopped to help. He is just feeling very tired all the time, every day. 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 First thing in the morning he is also tired. Feels like he just cant catch up during the day. Feels like he sleeps ok. Bp is up a bit today. Checking it here and there, 140s to 150s over 96     ASSESSMENT   Fatigue  Planning to have labs done this week. Needs to add on TSH and CBC for fatigue, ESR CRP, mail slips to him  Consider echo for eval of EF. Went to see ortho for shoulder pain, may have surgery later this year. PLAN   1. CAD with worsening angina/SPRING -   -treatment failure with Ranexa, had headaches with Imdur, previously treated with diltiazem, insufficient relief, fatigue  -changed from Toprol XL to bystolic for fatigue, cont low dose bystolic and the amlodipine.   -he is not taking L-arginine   -s/p  opening with Dr. Amber Tuttle 9/19  -spring and fatigue are worse, recheck labs recheck echo and look at rhythm/bradycardia/sss  2. HTN - at goal on current regimen   3.  Dyslipidemia- Lipids uncontrolled 12/19 with tg 591, needs recheck  -continue repatha, will check fasting lipids now  -TG > 600 now the past, previously improved with repatha, if not getting to goal will add vascepa  -has tried zetia, niacin, statins, fenofibrate  -failed pravastatin, simvastatin, rosuvastatin, fluvastatin - see records scanned from pharmacy regarding trials of meds. -could not take livalo due to nausea and severe leg cramps. 4. LANI- betamethasone to nose/forehead  5. Vitamin D deficiency - will check Vitamin D level      Cath 10/16 LM ok LAD ok D1 ok D2 ok LCx mod 40-50% diseae prox. Lg RCA with large PDA/PBL filling form L-R collaterals  WILD ok in 2015  Cath 6/11 LM ok LAD 10% LCx 10% RCA mod size mid occlusion, with L-R collaterals  Stress 10/09 EF 43% inferior infarct, ischemia at hospitals     Soc no tob no etoh  FHX hx cad, htn dad     We discussed the expected course, resolution and complications of the diagnosis(es) in detail. Medication risks, benefits, costs, interactions, and alternatives were discussed as indicated. I advised him to contact the office if his condition worsens, changes or fails to improve as anticipated.  He expressed understanding with the diagnosis(es) and plan    HISTORY OF PRESENTING ILLNESS      Sabino Ricks is a 59 y.o. male        ACTIVE PROBLEM LIST     Patient Active Problem List    Diagnosis Date Noted    Statin intolerance 09/12/2019    S/P cardiac cath 08/01/2019    Hypertriglyceridemia 06/11/2018    [facility]    Non-ST elevation (NSTEMI) myocardial infarction ([facility] 05/02/2017    Familial hyperlipidemia, high LDL 05/02/2017    CAD (coronary artery disease)     Hypertension            PAST MEDICAL HISTORY     Past Medical History:   Diagnosis Date    CAD (coronary artery disease)     GERD (gastroesophageal reflux disease)     Hypercholesterolemia     Hypertension     Ill-defined condition     [place]           PAST SURGICAL HISTORY     Past Surgical History:   Procedure Laterality Date    HX COLONOSCOPY  2012    est, neg    HX HEART CATHETERIZATION  2012    X2, no stents          ALLERGIES     Allergies   Allergen Reactions    Tetanus-Diphtheria Toxoids-Td Hives     Other reaction(s): red spot at injection site    Pcn [Penicillins] Other (comments)     Therapy ineffective            FAMILY HISTORY     Family History   Problem Relation Age of Onset    Heart Disease Father     Hypertension Father     Elevated Lipids Father     Hypertension Mother     Cancer Maternal Grandfather         Throat    Liver Disease Paternal Grandmother     Heart Disease Paternal Grandmother     Heart Disease Paternal Grandfather     negative for cardiac disease       SOCIAL HISTORY     Social History     Socioeconomic History    Marital status:      Spouse name: Not on file    Number of children: Not on file    Years of education: Not on file    Highest education level: Not on file   Tobacco Use    Smoking status: Former Smoker     Types: Cigarettes    Smokeless tobacco: Never Used    Tobacco comment: quit 40 years ago   Substance and Sexual Activity    Alcohol use: Yes     Comment: occasional    Drug use: No         MEDICATIONS     Current Outpatient Medications   Medication Sig    acetaminophen (Tylenol Extra Strength) 500 mg tablet Take 1,000 mg by mouth as needed for Pain.  amLODIPine (Norvasc) 2.5 mg tablet Take  by mouth daily.  evolocumab (REPATHA SURECLICK) pen injection 1 mL by SubCUTAneous route every fourteen (14) days.  valsartan (DIOVAN) 80 mg tablet Take 1 Tab by mouth daily.  icosapent ethyl (VASCEPA) 1 gram capsule Take 2 Caps by mouth two (2) times daily (with meals).  clotrimazole-betamethasone (LOTRISONE) topical cream APPLY TO AFFECTED AREA DAILY AS NEEDED    triamcinolone acetonide (KENALOG) 0.1 % topical cream use thin layer bid prn 10 -14 days. on face    cholecalciferol, vitamin D3, (VITAMIN D3) 2,000 unit tab Take 1 Tab by mouth daily.     nitroglycerin (NITROSTAT) 0.4 mg SL tablet 1 Tab by SubLINGual route every five (5) minutes as needed for Chest Pain.  clopidogrel (PLAVIX) 75 mg tab Take 1 Tab by mouth daily. Indications: blood clot prevention following percutaneous coronary intervention    esomeprazole (NEXIUM) 40 mg capsule Take 40 mg by mouth daily as needed.  aspirin delayed-release 81 mg tablet Take  by mouth daily.  naproxen sodium (ALEVE) 220 mg cap Take 1-2 Caps by mouth as needed.  lansoprazole (PREVACID) 15 mg capsule Take 1 Cap by mouth Daily (before breakfast). (Patient taking differently: Take 15 mg by mouth as needed.)     No current facility-administered medications for this visit. I have reviewed the nurses notes, vitals, problem list, allergy list, medical history, family, social history and medications. REVIEW OF SYMPTOMS     Constitutional: Negative for fever, chills, malaise/fatigue and diaphoresis. Respiratory: Negative for cough, hemoptysis, sputum production, shortness of breath and wheezing. Cardiovascular: Negative for chest pain, palpitations, orthopnea, claudication, leg swelling and PND. Gastrointestinal: Negative for heartburn, nausea, vomiting, blood in stool and melena. Genitourinary: Negative for dysuria and flank pain. Musculoskeletal: Negative for joint pain and back pain. Skin: Negative for rash. Neurological: Negative for focal weakness, seizures, loss of consciousness, weakness and headaches. Endo/Heme/Allergies: Negative for abnormal bleeding. Psychiatric/Behavioral: Negative for memory loss. PHYSICAL EXAMINATION      Due to this being a TeleHealth evaluation, many elements of the physical examination are unable to be assessed. General: Well developed, in no acute distress, cooperative and alert  HEENT: Pupils equal/round. No marked JVD visible on video.   Respiratory: No audible wheezing, no signs of respiratory distress, lips non cyanotic  Extremities:  No edema  Neuro: A&Ox3, speech clear, no facial droop, answering questions appropriately  Skin: Skin color is normal. No rashes or lesions. Non diaphoretic on visible skin during exam       DIAGNOSTIC DATA      No specialty comments available. LABORATORY DATA      Lab Results   Component Value Date/Time    WBC 11.2 (H) 12/03/2019 12:00 AM    HGB 14.9 12/03/2019 12:00 AM    HCT 45.2 12/03/2019 12:00 AM    PLATELET 470 48/81/0442 12:00 AM    MCV 90 12/03/2019 12:00 AM      Lab Results   Component Value Date/Time    Sodium 138 12/03/2019 12:00 AM    Potassium 4.5 12/03/2019 12:00 AM    Chloride 101 12/03/2019 12:00 AM    CO2 22 12/03/2019 12:00 AM    Anion gap 6 09/13/2019 04:50 AM    Glucose 102 (H) 12/03/2019 12:00 AM    BUN 19 12/03/2019 12:00 AM    Creatinine 1.09 12/03/2019 12:00 AM    BUN/Creatinine ratio 17 12/03/2019 12:00 AM    GFR est AA 82 12/03/2019 12:00 AM    GFR est non-AA 71 12/03/2019 12:00 AM    Calcium 9.6 12/03/2019 12:00 AM    Bilirubin, total 0.3 12/03/2019 12:00 AM    Alk. phosphatase 87 12/03/2019 12:00 AM    Protein, total 7.5 12/03/2019 12:00 AM    Albumin 4.8 12/03/2019 12:00 AM    A-G Ratio 1.8 12/03/2019 12:00 AM    ALT (SGPT) 31 12/03/2019 12:00 AM             FOLLOW-UP            Patient was made aware and verbalized understanding that an appointment will be scheduled for them for a virtual visit and/or office visit within the above time frame. Patient understanding his/her responsibility to call and change time/date if he/she so chooses. Thank you, Torres Oneill NP for allowing me to participate in the care of Sabino Ricks. Please do not hesitate to contact me for further questions/concerns. Greater than 20 minutes was spent in direct video patient care, planning and chart review. This visit was conducted using [employer] telemedicine services.        Juan Alberto Phipps MD    [address]        [phone] / [phone] Fax       [address], [address] [facility],[address]  [place]  [phone] / [phone] Fax

## 2020-06-16 NOTE — PATIENT INSTRUCTIONS
MD Matias Taveras, RN  
  
   
  
Mail labs to him, schedule echo and mail monitor for dyspnea cad fatigue and SSS Mail lab req and loop monitor information.  Scheduled echo with spouse for 6/22/20 at 4 pm.

## 2020-06-17 ENCOUNTER — CLINICAL SUPPORT (OUTPATIENT)
Dept: CARDIOLOGY CLINIC | Age: 65
End: 2020-06-17

## 2020-06-17 DIAGNOSIS — E78.1 HYPERTRIGLYCERIDEMIA: ICD-10-CM

## 2020-06-17 DIAGNOSIS — I10 ESSENTIAL HYPERTENSION: ICD-10-CM

## 2020-06-17 DIAGNOSIS — E78.49 FAMILIAL HYPERLIPIDEMIA, HIGH LDL: ICD-10-CM

## 2020-06-17 DIAGNOSIS — I24.0 RCA OCCLUSION (HCC): ICD-10-CM

## 2020-06-17 DIAGNOSIS — I25.118 CORONARY ARTERY DISEASE OF NATIVE ARTERY OF NATIVE HEART WITH STABLE ANGINA PECTORIS (HCC): ICD-10-CM

## 2020-06-17 DIAGNOSIS — E78.5 DYSLIPIDEMIA: ICD-10-CM

## 2020-06-17 DIAGNOSIS — R06.02 SHORTNESS OF BREATH: ICD-10-CM

## 2020-06-17 DIAGNOSIS — Z98.890 S/P CARDIAC CATH: ICD-10-CM

## 2020-06-17 DIAGNOSIS — I10 HYPERTENSION, UNSPECIFIED TYPE: ICD-10-CM

## 2020-06-17 DIAGNOSIS — I21.4 NON-ST ELEVATION (NSTEMI) MYOCARDIAL INFARCTION (HCC): ICD-10-CM

## 2020-06-17 DIAGNOSIS — I24.9 ACS (ACUTE CORONARY SYNDROME) (HCC): ICD-10-CM

## 2020-06-18 ENCOUNTER — TELEPHONE (OUTPATIENT)
Dept: CARDIOLOGY CLINIC | Age: 65
End: 2020-06-18

## 2020-06-18 NOTE — TELEPHONE ENCOUNTER
Noted.    Future Appointments   Date Time Provider Dutch George   6/22/2020  4:00 PM ECHO, [address]

## 2020-06-23 LAB
CRP SERPL HS-MCNC: 12.92 MG/L (ref 0–3)
ERYTHROCYTE [DISTWIDTH] IN BLOOD BY AUTOMATED COUNT: 13 % (ref 11.6–15.4)
ERYTHROCYTE [SEDIMENTATION RATE] IN BLOOD BY WESTERGREN METHOD: 33 MM/HR (ref 0–30)
HCT VFR BLD AUTO: 41 % (ref 37.5–51)
HGB BLD-MCNC: 14 G/DL (ref 13–17.7)
MCH RBC QN AUTO: 30.2 PG (ref 26.6–33)
MCHC RBC AUTO-ENTMCNC: 34.1 G/DL (ref 31.5–35.7)
MCV RBC AUTO: 88 FL (ref 79–97)
NT-PROBNP SERPL-MCNC: 115 PG/ML (ref 0–376)
PLATELET # BLD AUTO: 242 X10E3/UL (ref 150–450)
RBC # BLD AUTO: 4.64 X10E6/UL (ref 4.14–5.8)
T4 SERPL-MCNC: 7.3 UG/DL (ref 4.5–12)
TSH SERPL DL<=0.005 MIU/L-ACNC: 2.18 UIU/ML (ref 0.45–4.5)
WBC # BLD AUTO: 8.9 X10E3/UL (ref 3.4–10.8)

## 2020-07-16 DIAGNOSIS — E78.5 DYSLIPIDEMIA: ICD-10-CM

## 2020-07-16 DIAGNOSIS — I10 HYPERTENSION, UNSPECIFIED TYPE: ICD-10-CM

## 2020-07-16 DIAGNOSIS — Z98.890 S/P CARDIAC CATH: ICD-10-CM

## 2020-07-16 DIAGNOSIS — I10 ESSENTIAL HYPERTENSION: ICD-10-CM

## 2020-07-16 DIAGNOSIS — E78.49 FAMILIAL HYPERLIPIDEMIA, HIGH LDL: ICD-10-CM

## 2020-07-16 DIAGNOSIS — E78.1 HYPERTRIGLYCERIDEMIA: ICD-10-CM

## 2020-07-16 DIAGNOSIS — I24.0 RCA OCCLUSION (HCC): ICD-10-CM

## 2020-07-16 DIAGNOSIS — I25.118 CORONARY ARTERY DISEASE OF NATIVE ARTERY OF NATIVE HEART WITH STABLE ANGINA PECTORIS (HCC): ICD-10-CM

## 2020-07-16 DIAGNOSIS — I24.9 ACS (ACUTE CORONARY SYNDROME) (HCC): ICD-10-CM

## 2020-07-16 DIAGNOSIS — R06.02 SHORTNESS OF BREATH: ICD-10-CM

## 2020-07-16 DIAGNOSIS — I21.4 NON-ST ELEVATION (NSTEMI) MYOCARDIAL INFARCTION (HCC): ICD-10-CM

## 2020-07-17 ENCOUNTER — TELEPHONE (OUTPATIENT)
Dept: CARDIOLOGY CLINIC | Age: 65
End: 2020-07-17

## 2020-07-17 NOTE — TELEPHONE ENCOUNTER
The following loop monitor results given to patient per Dr. Cosme Breeding:    WNL, rate range , no arrhythmias    2 pt identifiers used

## 2020-08-07 RX ORDER — CLOPIDOGREL BISULFATE 75 MG/1
75 TABLET ORAL DAILY
Qty: 30 TAB | Refills: 11 | Status: SHIPPED | OUTPATIENT
Start: 2020-08-07 | End: 2021-08-31

## 2020-08-07 NOTE — TELEPHONE ENCOUNTER
Requested Prescriptions     Signed Prescriptions Disp Refills    clopidogreL (Plavix) 75 mg tab 30 Tab 11     Sig: Take 1 Tab by mouth daily.  Indications: blood clot prevention following percutaneous coronary intervention     Authorizing Provider: Myles Bartholomew User: Wilner Pyle     Per verbal orders

## 2020-09-16 ENCOUNTER — TELEPHONE (OUTPATIENT)
Dept: CARDIOLOGY CLINIC | Age: 65
End: 2020-09-16

## 2020-10-01 ENCOUNTER — TELEPHONE (OUTPATIENT)
Dept: CARDIOLOGY CLINIC | Age: 65
End: 2020-10-01

## 2020-10-01 DIAGNOSIS — R79.82 CRP ELEVATED: Primary | ICD-10-CM

## 2020-10-01 NOTE — TELEPHONE ENCOUNTER
Returned spouse call, 2 pt identifiers used    Spouse was just requesting the name of the Rheumatologist patient has an appointment with/  Patient has an appointment scheduled for Dec. With Dr. Carol Wheeler.      Future Appointments   Date Time Provider Dutch Kingi   11/6/2020  9:15 AM MD STEPHIE Patel [facility]   12/30/2020 11:00 AM Juan Diego Kim MD [facility]

## 2020-10-01 NOTE — TELEPHONE ENCOUNTER
Pt's spouse states Dr. Keri Staples referred the pt to a rheumatology but they forgot the name.  Please advise      [phone]  Phone:

## 2020-10-12 ENCOUNTER — TELEPHONE (OUTPATIENT)
Dept: CARDIOLOGY CLINIC | Age: 65
End: 2020-10-12

## 2020-10-12 RX ORDER — ICOSAPENT ETHYL 1000 MG/1
2 CAPSULE ORAL 2 TIMES DAILY WITH MEALS
Qty: 360 CAP | Refills: 1 | Status: SHIPPED | OUTPATIENT
Start: 2020-10-12

## 2020-10-12 NOTE — TELEPHONE ENCOUNTER
Tried returning call to spouse, no answer. Requested Prescriptions     Signed Prescriptions Disp Refills    icosapent ethyL (VASCEPA) 1 gram capsule 360 Cap 1     Sig: Take 2 Caps by mouth two (2) times daily (with meals).      Authorizing Provider: Migel Cazares     Ordering User: Kayleen Jama     Future Appointments   Date Time Provider Dutch Doris   10/21/2020  1:30 PM 1800 [address]   11/6/2020  9:15 AM Abraham Lundborg, MD [facility] AMB   12/30/2020 11:00 AM Tameka Kim MD [facility] AMB

## 2020-10-12 NOTE — TELEPHONE ENCOUNTER
Patients spouse would like to know if the patient needs to continue taking the medication, Vascepa.      Phone: [phone]

## 2020-11-02 LAB — 14.3.3 ETA, RHEUM. ARTHRITIS: 0.59 NG/ML

## 2020-11-03 ENCOUNTER — NURSE TRIAGE (OUTPATIENT)
Dept: OTHER | Facility: CLINIC | Age: 65
End: 2020-11-03

## 2020-11-03 NOTE — TELEPHONE ENCOUNTER
Reason for Disposition   [1] Symptoms of COVID-19 (e.g., cough, fever, SOB, or others) AND [2] within 14 days of EXPOSURE (close contact) with diagnosed or suspected COVID-19 patient   HIGH RISK patient (e.g., age > 59 years, diabetes, heart or lung disease, weak immune system) (Exception: has already been evaluated by healthcare provider and has no new or worsening symptoms)    Answer Assessment - Initial Assessment Questions  1. CLOSE CONTACT: \"Who is the person with the confirmed or suspected COVID-19 infection that you were exposed to? \"      Moshe Alatorre is working on this positive [employer], outside and inside of the home. 2. PLACE of CONTACT: \"Where were you when you were exposed to COVID-19? \" (e.g., home, school, medical waiting room; which city?)      Moshe Alatorre has been inside and outside this person's home and has been in contact with the positive person during conversations    3. TYPE of CONTACT: \"How much contact was there? \" (e.g., sitting next to, live in same house, work in same office, same building)      See below    4. DURATION of CONTACT: \"How long were you in contact with the COVID-19 patient? \" (e.g., a few seconds, passed by person, a few minutes, live with the patient)      He was with him several minutes each day last week . Longest period of time he was with him was probably 30 min, per Thompson's report    5. DATE of CONTACT: \"When did you have contact with a COVID-19 patient? \" (e.g., how many days ago)     - last Monday, Tuesday and wednesday    6. TRAVEL: \"Have you traveled out of the country recently? \" If so, \"When and where? \"      * Also ask about out-of-state travel, since the CDC has identified some high-risk cities for community spread in the [address]. * Note: Travel becomes less relevant if there is widespread community transmission where the patient lives. *No Answer*  7. COMMUNITY SPREAD: \"Are there lots of cases of COVID-19 (community spread) where you live? \" (See public health department website, if unsure)        *No Answer*  8. SYMPTOMS: \"Do you have any symptoms? \" (e.g., fever, cough, breathing difficulty)      Cough and diarrhea    9. PREGNANCY OR POSTPARTUM: \"Is there any chance you are pregnant? \" \"When was your last menstrual period? \" \"Did you deliver in the last 2 weeks? \"      N/a    10. HIGH RISK: \"Do you have any heart or lung problems? Do you have a weak immune system? \" (e.g., CHF, COPD, asthma, HIV positive, chemotherapy, renal failure, diabetes mellitus, sickle cell anemia)       \" A Heart condition\" with stent placement, high cholesterol . Hussain Smith is on plavix. Answer Assessment - Initial Assessment Questions  1. COVID-19 DIAGNOSIS: \"Who made your Coronavirus (COVID-19) diagnosis? \" \"Was it confirmed by a positive lab test?\" If not diagnosed by a HCP, ask \"Are there lots of cases (community spread) where you live? \" (See public health department website, if unsure)      Hussain Smith was in contact with a positive person    2. ONSET: \"When did the COVID-19 symptoms start? \"       One week ago, started with cough and diarrhea started 3 or 4 days ago    3. WORST SYMPTOM: \"What is your worst symptom? \" (e.g., cough, fever, shortness of breath, muscle aches)      Diarrhea is only 1 or 2 times per day . Cough is not frequent    4. COUGH: \"Do you have a cough? \" If so, ask: \"How bad is the cough? \"        Not frequent    5. FEVER: \"Do you have a fever? \" If so, ask: \"What is your temperature, how was it measured, and when did it start? \"      No    6. RESPIRATORY STATUS: \"Describe your breathing? \" (e.g., shortness of breath, wheezing, unable to speak)       Denies    7. BETTER-SAME-WORSE: Ryne Pals you getting better, staying the same or getting worse compared to yesterday? \"  If getting worse, ask, \"In what way? \"      Same    8. HIGH RISK DISEASE: \"Do you have any chronic medical problems? \" (e.g., asthma, heart or lung disease, weak immune system, etc.)      Cardiac stents, high cholesterol    9. PREGNANCY: \"Is there any chance you are pregnant? \" \"When was your last menstrual period? \"      No    10. OTHER SYMPTOMS: \"Do you have any other symptoms? \"  (e.g., chills, fatigue, headache, loss of smell or taste, muscle pain, sore throat)        Cough and diarhea    Protocols used: CORONAVIRUS (COVID-19) EXPOSURE-ADULT-[place], CORONAVIRUS (COVID-19) DIAGNOSED OR SUSPECTED-ADULT-[place]    Pod 7    Pt with symptoms as documented above. Pt informed of disposition. Pt educated on precautions/hand hygiene/ social distancing. Soft trx to [facility] Johnie) for further assistance. Care advice as documented. Please do not respond to the triage nurse through this encounter. Any subsequent communication should be directly with the patient.

## 2020-11-06 ENCOUNTER — TELEPHONE (OUTPATIENT)
Dept: CARDIOLOGY CLINIC | Age: 65
End: 2020-11-06

## 2020-11-06 ENCOUNTER — VIRTUAL VISIT (OUTPATIENT)
Dept: CARDIOLOGY CLINIC | Age: 65
End: 2020-11-06
Payer: MEDICARE

## 2020-11-06 DIAGNOSIS — I25.83 CORONARY ARTERY DISEASE DUE TO LIPID RICH PLAQUE: ICD-10-CM

## 2020-11-06 DIAGNOSIS — I25.10 CORONARY ARTERY DISEASE INVOLVING NATIVE CORONARY ARTERY OF NATIVE HEART WITHOUT ANGINA PECTORIS: ICD-10-CM

## 2020-11-06 DIAGNOSIS — I25.10 CORONARY ARTERY DISEASE DUE TO LIPID RICH PLAQUE: ICD-10-CM

## 2020-11-06 DIAGNOSIS — E78.1 HYPERTRIGLYCERIDEMIA: ICD-10-CM

## 2020-11-06 DIAGNOSIS — E78.5 DYSLIPIDEMIA: ICD-10-CM

## 2020-11-06 DIAGNOSIS — E78.49 FAMILIAL HYPERLIPIDEMIA, HIGH LDL: Primary | ICD-10-CM

## 2020-11-06 PROCEDURE — 99214 OFFICE O/P EST MOD 30 MIN: CPT | Performed by: INTERNAL MEDICINE

## 2020-11-06 NOTE — TELEPHONE ENCOUNTER
Faxed Lab Slip to Mr. Mumtaz Joseph at fax number [phone]. Fax confirmation received. Verified patient with two types of identifiers. Spoke with patient's wife, verified on HIPAA. Verified that she received lab slip. Patient's verbalized understanding and will call with any other questions.

## 2020-11-06 NOTE — PATIENT INSTRUCTIONS
MD Antonia Montero   
  
  Please fax him a lipid panel or email through cc Fu visit in person or vv in 3-4mos 11/6/2020 at 2:15 virtual appointment scheduled with patient - patient will MyChart his fax number to us to send all lab orders to Future Appointments Date Time Provider Dutch Doris 12/30/2020 11:00 AM Madonna Stevens MD [facility] BS AMB  
3/5/2021  8:30 AM Aries Mulligan MD [facility] BS AMB

## 2020-11-06 NOTE — PROGRESS NOTES
VIRTUAL VISIT DOCUMENTATION     Pursuant to the emergency declaration under the [address] waiver authority and the [employer] Act, this Virtual  Visit was conducted, with patient's consent, to reduce the patient's risk of exposure to COVID-19 and provide continuity of care for an established patient. Services were provided through a video synchronous discussion virtually to substitute for in-person clinic visit. CHIEF COMPLAINT      Jazmine Duvall is a 72 y.o. male who was seen by synchronous (real-time) audio-video technology on 11/6/2020. Patient is being seen today for CAD  RCA     Feels like he sleeps ok. Bp is up a bit today. Checking it here and there, 140s to 150s over 96  But overall he seems to be feeling better than he was on more medication with lower BP    He stamina is bad and feels like he is getting more tired and more out of beath and weaker, his knees are a big limiting factor for him. Unable to walk on a treadmill but he does what he can, he cant do steps etc.   He went to Dr Evelin Curiel and got cortisone shots in the shoulder. He got some \"relief factor\" OTC and that was helping him a whole lot, but rand out and getting stiffer and really noticed a difference with that. Could walk twice as far while he was taking that. So he plans to restart it. No ntg but he has had some chest pain here and there- has taken aspirin for it, but that made it go right away. The ntg headache is just too bad. Takes it if the aspirin doesn't help him. Still getting that heaviness and feeling tired in the chest. He was lifting a heavy piece of equipment when it triggered the chest pain/pressure. Dec 30th is his rheum appt. Popliteal aneurysm appt next week with Dr. Hali Maher.    Fax lab order to [phone]     ASSESSMENT   Fatigue continues  Claudication and leg pain- seeing Dr. Hali Maher next week  /79 so it is doing ok, sometimes lower 130/80. No changes today, await vascular and Rheum evaluation     PLAN   1. CAD with  angina/SPRING -   -treatment failure with Ranexa, had headaches with Imdur, previously treated with diltiazem, insufficient relief, fatigue  -changed from Toprol XL to bystolic for fatigue, cont low dose bystolic and the amlodipine.   -he is not taking L-arginine   -s/p  opening with Dr. Kathie Hammond 9/19  -spring and fatigue continue, echo ok, monitor ok rhythm/bradycardia/sss  2. HTN - at goal on current regimen   3. Dyslipidemia- Lipids uncontrolled 12/19 with tg 591, needs recheck  -continue repatha, will check fasting lipids now  -TG > 600 now the past, previously improved with repatha, if not getting to goal will add vascepa  -has tried zetia, niacin, statins, fenofibrate  -failed pravastatin, simvastatin, rosuvastatin, fluvastatin - see records scanned from pharmacy regarding trials of meds. -could not take livalo due to nausea and severe leg cramps. -needs lipids checked  4. LANI- betamethasone to nose/forehead  5. Vitamin D deficiency - will check Vitamin D level      Cath 10/16 LM ok LAD ok D1 ok D2 ok LCx mod 40-50% diseae prox. Lg RCA with large PDA/PBL filling form L-R collaterals  WILD ok in 2015  Cath 6/11 LM ok LAD 10% LCx 10% RCA mod size mid occlusion, with L-R collaterals  Stress 10/09 EF 43% inferior infarct, ischemia at [facility]     Soc no tob no etoh  FHX hx cad, htn dad     We discussed the expected course, resolution and complications of the diagnosis(es) in detail. Medication risks, benefits, costs, interactions, and alternatives were discussed as indicated. I advised him to contact the office if his condition worsens, changes or fails to improve as anticipated.  He expressed understanding with the diagnosis(es) and plan    HISTORY OF PRESENTING ILLNESS      Cornel Boykin is a 72 y.o. male        ACTIVE PROBLEM LIST     Patient Active Problem List    Diagnosis Date Noted    Statin intolerance 09/12/2019    S/P cardiac cath 08/01/2019    Hypertriglyceridemia 06/11/2018    RCA occlusion ([facility] 05/02/2018    Non-ST elevation (NSTEMI) myocardial infarction ([facility] [address] 05/02/2017    Familial hyperlipidemia, high LDL 05/02/2017    CAD (coronary artery disease)     Hypertension            PAST MEDICAL HISTORY     Past Medical History:   Diagnosis Date    CAD (coronary artery disease)     GERD (gastroesophageal reflux disease)     Hypercholesterolemia     Hypertension     Ill-defined condition     Reynauds           PAST SURGICAL HISTORY     Past Surgical History:   Procedure Laterality Date    HX COLONOSCOPY  2012    est, neg    HX HEART CATHETERIZATION  2012    X2, no stents          ALLERGIES     Allergies   Allergen Reactions    Tetanus-Diphtheria Toxoids-Td Hives     Other reaction(s): red spot at injection site    Pcn [Penicillins] Other (comments)     Therapy ineffective            FAMILY HISTORY     Family History   Problem Relation Age of Onset    Heart Disease Father     Hypertension Father     Elevated Lipids Father     Hypertension Mother     Cancer Maternal Grandfather         Throat    Liver Disease Paternal Grandmother     Heart Disease Paternal Grandmother     Heart Disease Paternal Grandfather     negative for cardiac disease       SOCIAL HISTORY     Social History     Socioeconomic History    Marital status:      Spouse name: Not on file    Number of children: Not on file    Years of education: Not on file    Highest education level: Not on file   Tobacco Use    Smoking status: Former Smoker     Types: Cigarettes    Smokeless tobacco: Never Used    Tobacco comment: quit 40 years ago   Substance and Sexual Activity    Alcohol use: Yes     Comment: occasional    Drug use: No         MEDICATIONS     Current Outpatient Medications   Medication Sig    icosapent ethyL (VASCEPA) 1 gram capsule Take 2 Caps by mouth two (2) times daily (with meals).  diclofenac (VOLTAREN) 1 % gel Apply 2 g to affected area four (4) times daily.  predniSONE (DELTASONE) 10 mg tablet 1 tab PO BID PRN joint pain    clopidogreL (Plavix) 75 mg tab Take 1 Tab by mouth daily. Indications: blood clot prevention following percutaneous coronary intervention    clotrimazole-betamethasone (LOTRISONE) topical cream APPLY TO AFFECTED AREA DAILY AS NEEDED    acetaminophen (Tylenol Extra Strength) 500 mg tablet Take 1,000 mg by mouth as needed for Pain.  amLODIPine (Norvasc) 2.5 mg tablet Take  by mouth daily.  evolocumab (REPATHA SURECLICK) pen injection 1 mL by SubCUTAneous route every fourteen (14) days.  valsartan (DIOVAN) 80 mg tablet Take 1 Tab by mouth daily.  triamcinolone acetonide (KENALOG) 0.1 % topical cream use thin layer bid prn 10 -14 days. on face    cholecalciferol, vitamin D3, (VITAMIN D3) 2,000 unit tab Take 1 Tab by mouth daily.  nitroglycerin (NITROSTAT) 0.4 mg SL tablet 1 Tab by SubLINGual route every five (5) minutes as needed for Chest Pain.  naproxen sodium (ALEVE) 220 mg cap Take 1-2 Caps by mouth as needed.  esomeprazole (NEXIUM) 40 mg capsule Take 40 mg by mouth daily as needed.  aspirin delayed-release 81 mg tablet Take  by mouth daily. No current facility-administered medications for this visit. I have reviewed the nurses notes, vitals, problem list, allergy list, medical history, family, social history and medications. REVIEW OF SYMPTOMS     Constitutional: Negative for fever, chills, malaise/fatigue and diaphoresis. Respiratory: Negative for cough, hemoptysis, sputum production, shortness of breath and wheezing. Cardiovascular: Negative for chest pain, palpitations, orthopnea, claudication, leg swelling and PND. Gastrointestinal: Negative for heartburn, nausea, vomiting, blood in stool and melena. Genitourinary: Negative for dysuria and flank pain.   Musculoskeletal: Negative for joint pain and back pain.  Skin: Negative for rash. Neurological: Negative for focal weakness, seizures, loss of consciousness, weakness and headaches. Endo/Heme/Allergies: Negative for abnormal bleeding. Psychiatric/Behavioral: Negative for memory loss. PHYSICAL EXAMINATION      Due to this being a TeleHealth evaluation, many elements of the physical examination are unable to be assessed. General: Well developed, in no acute distress, cooperative and alert  HEENT: Pupils equal/round. No marked JVD visible on video. Respiratory: No audible wheezing, no signs of respiratory distress, lips non cyanotic  Extremities:  No edema  Neuro: A&Ox3, speech clear, no facial droop, answering questions appropriately  Skin: Skin color is normal. No rashes or lesions. Non diaphoretic on visible skin during exam       DIAGNOSTIC DATA      No specialty comments available. LABORATORY DATA      Lab Results   Component Value Date/Time    WBC 8.3 10/21/2020 12:00 AM    HGB 15.4 10/21/2020 12:00 AM    HCT 43.8 10/21/2020 12:00 AM    PLATELET 891 55/37/7790 12:00 AM    MCV 87 10/21/2020 12:00 AM      Lab Results   Component Value Date/Time    Sodium 137 10/21/2020 12:00 AM    Potassium 4.6 10/21/2020 12:00 AM    Chloride 103 10/21/2020 12:00 AM    CO2 23 10/21/2020 12:00 AM    Anion gap 6 09/13/2019 04:50 AM    Glucose 89 10/21/2020 12:00 AM    BUN 18 10/21/2020 12:00 AM    Creatinine 0.98 10/21/2020 12:00 AM    BUN/Creatinine ratio 18 10/21/2020 12:00 AM    GFR est AA 93 10/21/2020 12:00 AM    GFR est non-AA 81 10/21/2020 12:00 AM    Calcium 9.7 10/21/2020 12:00 AM    Bilirubin, total 0.3 10/21/2020 12:00 AM    Alk.  phosphatase 97 10/21/2020 12:00 AM    Protein, total 7.5 10/21/2020 12:00 AM    Albumin 4.7 10/21/2020 12:00 AM    A-G Ratio 1.7 10/21/2020 12:00 AM    ALT (SGPT) 26 10/21/2020 12:00 AM             FOLLOW-UP            Patient was made aware and verbalized understanding that an appointment will be scheduled for them for a virtual visit and/or office visit within the above time frame. Patient understanding his/her responsibility to call and change time/date if he/she so chooses. Thank you, Alli Munoz NP for allowing me to participate in the care of Sabrina Cagle. Please do not hesitate to contact me for further questions/concerns. Greater than 20 minutes was spent in direct video patient care, planning and chart review. This visit was conducted using [employer] telemedicine services.        Vito Arenas MD    [address]        [phone] / [phone] Fax       [facility] [address]  [place]  [phone] / [phone] Fax

## 2020-11-09 ENCOUNTER — TELEPHONE (OUTPATIENT)
Dept: CARDIOLOGY CLINIC | Age: 65
End: 2020-11-09

## 2020-11-09 DIAGNOSIS — E78.49 FAMILIAL HYPERLIPIDEMIA, HIGH LDL: Primary | ICD-10-CM

## 2020-11-09 DIAGNOSIS — E78.1 HYPERTRIGLYCERIDEMIA: ICD-10-CM

## 2020-11-09 DIAGNOSIS — E78.5 DYSLIPIDEMIA: ICD-10-CM

## 2020-11-09 NOTE — TELEPHONE ENCOUNTER
Patients spouse would like to know what Dr. Yamilka Zuleta thoughts would be on the patient having shoulder surgery. Please advise.     Phone: [phone]

## 2020-11-09 NOTE — TELEPHONE ENCOUNTER
Patient is scheduled to have a cholesterol panel and the patient wife would like to know if a cholesterol panel and Triglyceride order can be done separately so that they an get an accurate reading. Please advise.          Phone: [phone]

## 2020-11-10 DIAGNOSIS — E78.49 FAMILIAL HYPERLIPIDEMIA, HIGH LDL: ICD-10-CM

## 2020-11-10 DIAGNOSIS — E78.5 DYSLIPIDEMIA: ICD-10-CM

## 2020-11-10 DIAGNOSIS — E78.1 HYPERTRIGLYCERIDEMIA: ICD-10-CM

## 2020-11-10 NOTE — TELEPHONE ENCOUNTER
Verified patient with two types of identifiers. Let wife know I changed the order and faxed the NMR and direct LDL. Patient's wife verbalized understanding and will call with any other questions. Received fax confirmation.

## 2020-11-10 NOTE — TELEPHONE ENCOUNTER
Returned call to spouse, 2 pt identifiers used    The following message given. Patient is not currently scheduled for surgery. Advised spouse to have surgeon fax us a clearance request when needed.      MD Maranda Samaniego Emmer Byars, NP; Izabel Walker RN    Caller: Unspecified Rodrigez Kory,  4:41 PM)               He is ok to proceed if he chooses

## 2020-11-20 NOTE — PROGRESS NOTES
1. Have you been to the ER, urgent care clinic since your last visit? Hospitalized since your last visit? YES, ER,  [facility] LAST WEEK. 2. Have you seen or consulted any other health care providers outside of the [address] [place] [address] since your last visit? Include any pap smears or colon screening. NO    C/O PRESSURE IN CHEST WITH EXERTION, SWELLING IN BLE, SOB. Principal Discharge DX:	Abdominal pain  Secondary Diagnosis:	Fever

## 2021-01-06 ENCOUNTER — TELEPHONE (OUTPATIENT)
Dept: CARDIOLOGY CLINIC | Age: 66
End: 2021-01-06

## 2021-01-06 NOTE — TELEPHONE ENCOUNTER
Patients wife is calling as the patient is having pressure in his chest as well as fatigue and would like guidance as to what he should do. Please advise.         Phone: [phone]

## 2021-01-20 RX ORDER — EVOLOCUMAB 140 MG/ML
INJECTION, SOLUTION SUBCUTANEOUS
Qty: 12 PEN | Refills: 2 | Status: SHIPPED | OUTPATIENT
Start: 2021-01-20 | End: 2021-05-17 | Stop reason: SDUPTHER

## 2021-01-29 ENCOUNTER — TELEPHONE (OUTPATIENT)
Dept: CARDIOLOGY CLINIC | Age: 66
End: 2021-01-29

## 2021-01-29 NOTE — TELEPHONE ENCOUNTER
Prior Auth completed and Josekacy Cazares has been approved. PA Case: [id number], Status: Approved, Coverage Starts on: 1/29/2021 12:00:00 AM, Coverage Ends on: 1/29/2022 12:00:00 AM.      Returned call to spouse and advised above.      2 pt identifiers used

## 2021-01-29 NOTE — TELEPHONE ENCOUNTER
Patient's wife stated that a prior [place] is needed for the patient to receive Repatha.      Phone #: [phone]  Thanks

## 2021-03-05 ENCOUNTER — VIRTUAL VISIT (OUTPATIENT)
Dept: CARDIOLOGY CLINIC | Age: 66
End: 2021-03-05
Payer: MEDICARE

## 2021-03-05 DIAGNOSIS — E78.5 DYSLIPIDEMIA: ICD-10-CM

## 2021-03-05 DIAGNOSIS — R79.82 CRP ELEVATED: ICD-10-CM

## 2021-03-05 DIAGNOSIS — E78.49 FAMILIAL HYPERLIPIDEMIA, HIGH LDL: ICD-10-CM

## 2021-03-05 DIAGNOSIS — Z98.890 S/P CARDIAC CATH: ICD-10-CM

## 2021-03-05 DIAGNOSIS — R06.02 SHORTNESS OF BREATH: ICD-10-CM

## 2021-03-05 DIAGNOSIS — I24.0 RCA OCCLUSION (HCC): ICD-10-CM

## 2021-03-05 DIAGNOSIS — I10 ESSENTIAL HYPERTENSION: ICD-10-CM

## 2021-03-05 DIAGNOSIS — E78.1 HYPERTRIGLYCERIDEMIA: ICD-10-CM

## 2021-03-05 DIAGNOSIS — I25.10 CORONARY ARTERY DISEASE DUE TO LIPID RICH PLAQUE: Primary | ICD-10-CM

## 2021-03-05 DIAGNOSIS — I25.83 CORONARY ARTERY DISEASE DUE TO LIPID RICH PLAQUE: Primary | ICD-10-CM

## 2021-03-05 PROCEDURE — 99214 OFFICE O/P EST MOD 30 MIN: CPT | Performed by: INTERNAL MEDICINE

## 2021-03-05 NOTE — PROGRESS NOTES
VIRTUAL VISIT DOCUMENTATION     Pursuant to the emergency declaration under the [facility] waiver authority and the [employer] Act, this Virtual  Visit was conducted, with patient's consent, to reduce the patient's risk of exposure to COVID-19 and provide continuity of care for an established patient. Services were provided through a video synchronous discussion virtually to substitute for in-person clinic visit. CHIEF COMPLAINT      Supa Shipley is a 72 y.o. male who was seen by synchronous (real-time) audio-video technology on 3/5/2021. Patient is being seen today for CAD  RCA     He is working more than he'd like to but overall he feels like he is doing better, overall his stamina is better. His wife says he is complaining the same but that he is actually doing better. BP running 152/91, pulse 72   Rare flutters but not too bad. A little out of breath if he overdoes it but recovers quickly. But overall he seems to be feeling better than he was on more medication with lower BP    He has been working a lot more recently and wants to work towards retiring. He went to Dr Yovana Mckeon and got cortisone shots in the shoulder. He is considering moving forward with his shoulder replacement surgeries, we discussed that this is a good time from cv standpoint- he feels well, and is a year out from last PCI to interrupt plavix. He hasn't had to take any ntg in the last few months. Next Wed is rhem appt. Popliteal aneurysm appt last week, has more complete leg testing coming up. Fax lab order to     Way overdue for labs, insurance wasn't cooperating, but seems to be ok to do it now, will add cbc and he will get them done. ASSESSMENT   Fatigue continues  Claudication and leg pain- seeing Dr. Katia Wilkinson next week  /79 so it is doing ok, sometimes lower 130/80.    No changes today, await vascular and Rheum evaluation     PLAN   1. CAD with  angina/ROSALES -   -treatment failure with Ranexa, had headaches with Imdur, previously treated with diltiazem, insufficient relief, fatigue  -changed from Toprol XL to bystolic for fatigue, cont low dose bystolic and the amlodipine.   -he is not taking L-arginine   -s/p  opening with Dr. Jourdan Carr 9/19  -rosales and fatigue continue, echo ok, monitor ok rhythm/bradycardia/sss  2. HTN - at goal on current regimen   3. Dyslipidemia- Lipids uncontrolled 12/19 with tg 591, needs recheck  -continue repatha, will check fasting lipids now  -TG > 600 now the past, previously improved with repatha, if not getting to goal will add vascepa  -has tried zetia, niacin, statins, fenofibrate  -failed pravastatin, simvastatin, rosuvastatin, fluvastatin - see records scanned from pharmacy regarding trials of meds. -could not take livalo due to nausea and severe leg cramps. -needs lipids checked  4. LANI- betamethasone to nose/forehead  5. Vitamin D deficiency - will check Vitamin D level      Cath 10/16 LM ok LAD ok D1 ok D2 ok LCx mod 40-50% diseae prox. Lg RCA with large PDA/PBL filling from L-R collaterals  WILD ok in 2015  Cath 6/11 LM ok LAD 10% LCx 10% RCA mod size mid occlusion, with L-R collaterals  Stress 10/09 EF 43% inferior infarct, ischemia at [facility]     Soc no tob no etoh  FHX hx cad, htn dad     We discussed the expected course, resolution and complications of the diagnosis(es) in detail. Medication risks, benefits, costs, interactions, and alternatives were discussed as indicated. I advised him to contact the office if his condition worsens, changes or fails to improve as anticipated.  He expressed understanding with the diagnosis(es) and plan    HISTORY OF PRESENTING ILLNESS      He Ortiz is a 72 y.o. male        ACTIVE PROBLEM LIST     Patient Active Problem List    Diagnosis Date Noted    Statin intolerance 09/12/2019    S/P cardiac cath 08/01/2019  Hypertriglyceridemia 06/11/2018    RCA occlusion ([facility] 05/02/2018    Non-ST elevation (NSTEMI) myocardial infarction ([facility] 05/02/2017    Familial hyperlipidemia, high LDL 05/02/2017    CAD (coronary artery disease)     Hypertension            PAST MEDICAL HISTORY     Past Medical History:   Diagnosis Date    CAD (coronary artery disease)     GERD (gastroesophageal reflux disease)     Hypercholesterolemia     Hypertension     Ill-defined condition     [place]           PAST SURGICAL HISTORY     Past Surgical History:   Procedure Laterality Date    HX COLONOSCOPY  2012    est, neg    HX HEART CATHETERIZATION  2012    X2, no stents          ALLERGIES     Allergies   Allergen Reactions    Tetanus-Diphtheria Toxoids-Td Hives     Other reaction(s): red spot at injection site    Pcn [Penicillins] Other (comments)     Therapy ineffective            FAMILY HISTORY     Family History   Problem Relation Age of Onset    Heart Disease Father     Hypertension Father     Elevated Lipids Father     Hypertension Mother     Cancer Maternal Grandfather         Throat    Liver Disease Paternal Grandmother     Heart Disease Paternal Grandmother     Heart Disease Paternal Grandfather     negative for cardiac disease       SOCIAL HISTORY     Social History     Socioeconomic History    Marital status:      Spouse name: Not on file    Number of children: Not on file    Years of education: Not on file    Highest education level: Not on file   Tobacco Use    Smoking status: Former Smoker     Types: Cigarettes    Smokeless tobacco: Never Used    Tobacco comment: quit 40 years ago   Substance and Sexual Activity    Alcohol use: Yes     Comment: occasional    Drug use: No         MEDICATIONS     Current Outpatient Medications   Medication Sig    Repatha SureClick pen injection INJECT 1 PEN UNDER THE SKIN EVERY 14 DAYS.     guaiFENesin ER (MUCINEX) 600 mg ER tablet Take 1 Tab by mouth two (2) times a day. Indications: cold symptoms, cough    fluticasone propionate (FLONASE) 50 mcg/actuation nasal spray 2 Sprays by Both Nostrils route daily. Indications: runny nose    ipratropium (ATROVENT) 42 mcg (0.06 %) nasal spray 2 Sprays by Both Nostrils route four (4) times daily. Indications: runny nose    amLODIPine (NORVASC) 2.5 mg tablet Take 1 Tab by mouth daily.  icosapent ethyL (VASCEPA) 1 gram capsule Take 2 Caps by mouth two (2) times daily (with meals).  diclofenac (VOLTAREN) 1 % gel Apply 2 g to affected area four (4) times daily.  clopidogreL (Plavix) 75 mg tab Take 1 Tab by mouth daily. Indications: blood clot prevention following percutaneous coronary intervention    clotrimazole-betamethasone (LOTRISONE) topical cream APPLY TO AFFECTED AREA DAILY AS NEEDED    acetaminophen (Tylenol Extra Strength) 500 mg tablet Take 1,000 mg by mouth as needed for Pain.  valsartan (DIOVAN) 80 mg tablet Take 1 Tab by mouth daily.  triamcinolone acetonide (KENALOG) 0.1 % topical cream use thin layer bid prn 10 -14 days. on face    cholecalciferol, vitamin D3, (VITAMIN D3) 2,000 unit tab Take 1 Tab by mouth daily.  nitroglycerin (NITROSTAT) 0.4 mg SL tablet 1 Tab by SubLINGual route every five (5) minutes as needed for Chest Pain.  naproxen sodium (ALEVE) 220 mg cap Take 1-2 Caps by mouth as needed.  esomeprazole (NEXIUM) 40 mg capsule Take 40 mg by mouth daily as needed.  aspirin delayed-release 81 mg tablet Take  by mouth daily. No current facility-administered medications for this visit. I have reviewed the nurses notes, vitals, problem list, allergy list, medical history, family, social history and medications. REVIEW OF SYMPTOMS     Constitutional: Negative for fever, chills, malaise/fatigue and diaphoresis. Respiratory: Negative for cough, hemoptysis, sputum production, shortness of breath and wheezing.    Cardiovascular: Negative for chest pain, palpitations, orthopnea, claudication, leg swelling and PND. Gastrointestinal: Negative for heartburn, nausea, vomiting, blood in stool and melena. Genitourinary: Negative for dysuria and flank pain. Musculoskeletal: Negative for joint pain and back pain. Skin: Negative for rash. Neurological: Negative for focal weakness, seizures, loss of consciousness, weakness and headaches. Endo/Heme/Allergies: Negative for abnormal bleeding. Psychiatric/Behavioral: Negative for memory loss. PHYSICAL EXAMINATION      Due to this being a TeleHealth evaluation, many elements of the physical examination are unable to be assessed. General: Well developed, in no acute distress, cooperative and alert  HEENT: Pupils equal/round. No marked JVD visible on video. Respiratory: No audible wheezing, no signs of respiratory distress, lips non cyanotic  Extremities:  No edema  Neuro: A&Ox3, speech clear, no facial droop, answering questions appropriately  Skin: Skin color is normal. No rashes or lesions. Non diaphoretic on visible skin during exam       DIAGNOSTIC DATA      No specialty comments available. LABORATORY DATA      Lab Results   Component Value Date/Time    WBC 8.3 10/21/2020 12:00 AM    HGB 15.4 10/21/2020 12:00 AM    HCT 43.8 10/21/2020 12:00 AM    PLATELET 247 97/20/9685 12:00 AM    MCV 87 10/21/2020 12:00 AM      Lab Results   Component Value Date/Time    Sodium 137 10/21/2020 12:00 AM    Potassium 4.6 10/21/2020 12:00 AM    Chloride 103 10/21/2020 12:00 AM    CO2 23 10/21/2020 12:00 AM    Anion gap 6 09/13/2019 04:50 AM    Glucose 89 10/21/2020 12:00 AM    BUN 18 10/21/2020 12:00 AM    Creatinine 0.98 10/21/2020 12:00 AM    BUN/Creatinine ratio 18 10/21/2020 12:00 AM    GFR est AA 93 10/21/2020 12:00 AM    GFR est non-AA 81 10/21/2020 12:00 AM    Calcium 9.7 10/21/2020 12:00 AM    Bilirubin, total 0.3 10/21/2020 12:00 AM    Alk.  phosphatase 97 10/21/2020 12:00 AM    Protein, total 7.5 10/21/2020 12:00 AM    Albumin 4.7 10/21/2020 12:00 AM    A-G Ratio 1.7 10/21/2020 12:00 AM    ALT (SGPT) 26 10/21/2020 12:00 AM             FOLLOW-UP            Patient was made aware and verbalized understanding that an appointment will be scheduled for them for a virtual visit and/or office visit within the above time frame. Patient understanding his/her responsibility to call and change time/date if he/she so chooses. Thank you, Yang Ferguson NP for allowing me to participate in the care of Marlo Gallego. Please do not hesitate to contact me for further questions/concerns.        Darek Fernandez MD    [address] [facility]        [phone] / [phone] Fax       [facility], [address] [address]  [place]  [phone] / [phone] Fax

## 2021-03-05 NOTE — PATIENT INSTRUCTIONS
MD Amor Ramirez             Go ahead and make him a July fuv with echo for ef cad     3/5/2021 appointments scheduled with patient     Future Appointments   Date Time Provider Dutch Doris   3/10/2021 11:00 AM MD EDITH Banuelos BS AMB   3/12/2021  8:10 AM WALE Jay MAIN BS AMB   7/7/2021 11:00 AM ESTEBAN TELLES BS AMB   7/7/2021 11:40 AM MD STEPHIE Ramirez BS AMB

## 2021-03-08 ENCOUNTER — OFFICE VISIT (OUTPATIENT)
Dept: FAMILY MEDICINE CLINIC | Age: 66
End: 2021-03-08
Payer: COMMERCIAL

## 2021-03-08 VITALS
RESPIRATION RATE: 17 BRPM | OXYGEN SATURATION: 98 % | WEIGHT: 232 LBS | SYSTOLIC BLOOD PRESSURE: 142 MMHG | TEMPERATURE: 97.8 F | BODY MASS INDEX: 32.48 KG/M2 | DIASTOLIC BLOOD PRESSURE: 82 MMHG | HEART RATE: 96 BPM | HEIGHT: 71 IN

## 2021-03-08 DIAGNOSIS — S61.451A DOG BITE OF RIGHT HAND, INITIAL ENCOUNTER: Primary | ICD-10-CM
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
## 2021-03-08 DIAGNOSIS — L03.011 CELLULITIS OF FINGER OF RIGHT HAND: ICD-10-CM

## 2021-03-08 PROCEDURE — 99213 OFFICE O/P EST LOW 20 MIN: CPT | Performed by: NURSE PRACTITIONER

## 2021-03-08 PROCEDURE — 96372 THER/PROPH/DIAG INJ SC/IM: CPT | Performed by: NURSE PRACTITIONER

## 2021-03-08 RX ORDER — CEFTRIAXONE 1 G/1
1 INJECTION, POWDER, FOR SOLUTION INTRAMUSCULAR; INTRAVENOUS ONCE
Qty: 1 VIAL | Refills: 0
Start: 2021-03-08 | End: 2021-03-08

## 2021-03-08 RX ORDER — AMOXICILLIN AND CLAVULANATE POTASSIUM 875; 125 MG/1; MG/1
1 TABLET, FILM COATED ORAL 2 TIMES DAILY
Qty: 20 TAB | Refills: 0 | Status: SHIPPED | OUTPATIENT
Start: 2021-03-08 | End: 2021-03-18

## 2021-03-08 RX ORDER — NITROGLYCERIN 0.4 MG/1
0.4 TABLET SUBLINGUAL
Qty: 1 BOTTLE | Refills: 6 | Status: SHIPPED | OUTPATIENT
Start: 2021-03-08

## 2021-03-08 NOTE — PROGRESS NOTES
Chief Complaint   Patient presents with    Dog Bite       HPI:    Zarina Zhu is a 72 y.o. male. Cardiac disease: Had PCA with Dr. Kristan Garnica to the Lcx and his RCA  9/2019. Hx CAD with SOB, angina. On amlodipine QHS. Chronic angina/SPRING. Saw Dr. Maria D Rosa last week with a good report. Dyslipidemia: TG remain elevated on Desire Irons. Polyarthralgia:  Chronic; sees rheumatology on Wednesday. Right popliteal arterial aneurysm:  Stable, under the care of Dr. Praful Gutierrez. Has vascular studies ordered for later this month. New issues: Here today for dog bite to right hand. Occurred 3/6. A single puncture wound to dorsum of right hand by his own dog while playing. Pt has been expressing purulent material at home. Pain and redness are worsening. He is attempting to slow down at work. He has gotten the go-ahead cardiology to have bilateral shoulder replacements. Allergies   Allergen Reactions    Tetanus-Diphtheria Toxoids-Td Hives     Other reaction(s): red spot at injection site    Pcn [Penicillins] Other (comments)     Therapy ineffective         Current Outpatient Medications   Medication Sig    triamcinolone acetonide (KENALOG) 0.1 % topical cream APPLY  CREAM EXTERNALLY TO AFFECTED AREA TWICE DAILY FOR  10  TO  14  DAYS  USING  A  THIN  LAYER  ON  FACE    clotrimazole-betamethasone (LOTRISONE) topical cream APPLY  CREAM TOPICALLY TO AFFECTED AREA DAILY AS NEEDED    cefTRIAXone (Rocephin) 1 gram injection 1 g by IntraMUSCular route once for 1 dose.  amoxicillin-clavulanate (AUGMENTIN) 875-125 mg per tablet Take 1 Tab by mouth two (2) times a day for 10 days.  Repatha SureClick pen injection INJECT 1 PEN UNDER THE SKIN EVERY 14 DAYS.  guaiFENesin ER (MUCINEX) 600 mg ER tablet Take 1 Tab by mouth two (2) times a day. Indications: cold symptoms, cough    fluticasone propionate (FLONASE) 50 mcg/actuation nasal spray 2 Sprays by Both Nostrils route daily.  Indications: runny nose    ipratropium (ATROVENT) 42 mcg (0.06 %) nasal spray 2 Sprays by Both Nostrils route four (4) times daily. Indications: runny nose    amLODIPine (NORVASC) 2.5 mg tablet Take 1 Tab by mouth daily.  icosapent ethyL (VASCEPA) 1 gram capsule Take 2 Caps by mouth two (2) times daily (with meals).  diclofenac (VOLTAREN) 1 % gel Apply 2 g to affected area four (4) times daily.  clopidogreL (Plavix) 75 mg tab Take 1 Tab by mouth daily. Indications: blood clot prevention following percutaneous coronary intervention    acetaminophen (Tylenol Extra Strength) 500 mg tablet Take 1,000 mg by mouth as needed for Pain.  valsartan (DIOVAN) 80 mg tablet Take 1 Tab by mouth daily.  cholecalciferol, vitamin D3, (VITAMIN D3) 2,000 unit tab Take 1 Tab by mouth daily.  nitroglycerin (NITROSTAT) 0.4 mg SL tablet 1 Tab by SubLINGual route every five (5) minutes as needed for Chest Pain.  naproxen sodium (ALEVE) 220 mg cap Take 1-2 Caps by mouth as needed.  esomeprazole (NEXIUM) 40 mg capsule Take 40 mg by mouth daily as needed.  aspirin delayed-release 81 mg tablet Take  by mouth daily. No current facility-administered medications for this visit. Past Medical History:   Diagnosis Date    CAD (coronary artery disease)     GERD (gastroesophageal reflux disease)     Hypercholesterolemia     Hypertension     Ill-defined condition     Deisi       Family History   Problem Relation Age of Onset    Heart Disease Father     Hypertension Father     Elevated Lipids Father     Hypertension Mother     Cancer Maternal Grandfather         Throat    Liver Disease Paternal Grandmother     Heart Disease Paternal Grandmother     Heart Disease Paternal Grandfather        ROS:  Denies fever, chills, cough, chest pain, SOB,  nausea, vomiting, diarrhea, dysuria. Denies rashes, + wounds, arthralgias, weakness, numbness, visual changes, depression.   Denies wt loss, wt gain, hemoptysis, hematochezia or melena. Patient is not experiencing chest pain radiating to the jaw and/or down the arms. Physical Examination:    BP (!) 142/82 (BP 1 Location: Right arm, BP Patient Position: Sitting, BP Cuff Size: Large adult)   Pulse 96   Temp 97.8 °F (36.6 °C) (Temporal)   Resp 17   Ht 5' 11\" (1.803 m)   Wt 232 lb (105.2 kg)   SpO2 98%   BMI 32.36 kg/m²     Wt Readings from Last 3 Encounters:   03/08/21 232 lb (105.2 kg)   06/22/20 229 lb (103.9 kg)   12/27/19 229 lb 6.4 oz (104.1 kg)       Physical Exam    Constitutional: WDWN Male in no acute distress  HENT:  NC/AT  EYES: EOMI, PERRL  Neck:  Supple, no JVD, mass or bruit. No thyromegaly. Respiratory:  Respirations even and unlabored without use of accessory muscles, CTA throughout without wheezes, rales, rubs or rhonchi. Symmetrical chest expansion. Cardiac: RRR, no murmur  Abdomen:  +BS, soft, nontender without palpable HSM   Musculoskeletal:  No cyanosis, clubbing or edema of extremities. Moves all extremities without difficulty. Neurologic:  Smooth, even gait without assistance, CN 2-12 grossly intact. Skin: Single superficial puncture wound to ulnar aspect of right dorsum hand. No visible drainage. Erythema and warmth to base of fingers and proximally to wrist.  Cap refill <2, sensation and ROM of digits intact. Lymphadenopathy: no cervical or supraclavicular nodes  Psych: Pleasant and appropriate. Judgment normal. Alert and oriented x 3. ASSESSMENT AND PLAN:       ICD-10-CM ICD-9-CM    1. Dog bite of right hand, initial encounter  S61.451A 882.0 cefTRIAXone (Rocephin) 1 gram injection    W54. 0XXA E906.0 CEFTRIAXONE SODIUM INJECTION  MG      TN THER/PROPH/DIAG INJECTION, SUBCUT/IM      amoxicillin-clavulanate (AUGMENTIN) 875-125 mg per tablet   2. Cellulitis of finger of right hand  L03.011 681.00      Discussed PCN allergy listed in chart. Patient reports receiving doses as a child that were ineffective; no allergic type reaction. Treat as above. He will come back Friday for wound check. Discussed s/s that warrant sooner follow-up. Tdap up to date. Patient aware of plan of care and verbalized understanding. Questions answered. RTC as above or sooner if needed.     Reese Stevens NP

## 2021-03-08 NOTE — TELEPHONE ENCOUNTER
Pts wife called asking if Sofia Maat could call in his Nitroglycerin? Catrachita Rodney.  Please call wife to let her know when the RX has been ordered so she can go pick it up

## 2021-03-08 NOTE — PATIENT INSTRUCTIONS
Animal Bites: Care Instructions Your Care Instructions After an animal bite, the biggest concern is infection. The chance of infection depends on the type of animal that bit you, where on your body you were bitten, and your general health. Many animal bites are not closed with stitches, because this can increase the chance of infection. Your bite may take as little as 7 days or as long as several months to heal, depending on how bad it is. Taking good care of your wound at home will help it heal and reduce your chance of infection. The doctor has checked you carefully, but problems can develop later. If you notice any problems or new symptoms, get medical treatment right away. Follow-up care is a key part of your treatment and safety. Be sure to make and go to all appointments, and call your doctor if you are having problems. It's also a good idea to know your test results and keep a list of the medicines you take. How can you care for yourself at home? · If your doctor told you how to care for your wound, follow your doctor's instructions. If you did not get instructions, follow this general advice: ? After 24 to 48 hours, gently wash the wound with clean water 2 times a day. Do not scrub or soak the wound. Don't use hydrogen peroxide or alcohol, which can slow healing. ? You may cover the wound with a thin layer of petroleum jelly, such as Vaseline, and a nonstick bandage. ? Apply more petroleum jelly and replace the bandage as needed. · After you shower, gently dry the wound with a clean towel. · If your doctor has closed the wound, cover the bandage with a plastic bag before you take a shower. · A small amount of skin redness and swelling around the wound edges and the stitches or staples is normal. Your wound may itch or feel irritated. Do not scratch or rub the wound. · Ask your doctor if you can take an over-the-counter pain medicine, such as acetaminophen (Tylenol), ibuprofen (Advil, Motrin), or naproxen (Aleve). Read and follow all instructions on the label. · Do not take two or more pain medicines at the same time unless the doctor told you to. Many pain medicines have acetaminophen, which is Tylenol. Too much acetaminophen (Tylenol) can be harmful. · If your bite puts you at risk for rabies, you will get a series of shots over the next few weeks to prevent rabies. Your doctor will tell you when to get the shots. It is very important that you get the full cycle of shots. Follow your doctor's instructions exactly. · You may need a tetanus shot if you have not received one in the last 5 years. · If your doctor prescribed antibiotics, take them as directed. Do not stop taking them just because you feel better. You need to take the full course of antibiotics. When should you call for help? Call your doctor now or seek immediate medical care if: 
  · The skin near the bite turns cold or pale or it changes color.  
  · You lose feeling in the area near the bite, or it feels numb or tingly.  
  · You have trouble moving a limb near the bite.  
  · You have symptoms of infection, such as: 
? Increased pain, swelling, warmth, or redness near the wound. ? Red streaks leading from the wound. ? Pus draining from the wound. ? A fever.  
  · Blood soaks through the bandage. Oozing small amounts of blood is normal.  
  · Your pain is getting worse. Watch closely for changes in your health, and be sure to contact your doctor if you are not getting better as expected. Where can you learn more? Go to http://www.gray.com/ Enter J382 in the search box to learn more about \"Animal Bites: Care Instructions. \" Current as of: June 26, 2019               Content Version: 12.6 © 9028-6333 [employer]. Care instructions adapted under license by [employer] (which disclaims liability or warranty for this information). If you have questions about a medical condition or this instruction, always ask your healthcare professional. [address] any warranty or liability for your use of this information.

## 2021-03-09 ENCOUNTER — TELEPHONE (OUTPATIENT)
Dept: RHEUMATOLOGY | Age: 66
End: 2021-03-09

## 2021-03-12 ENCOUNTER — OFFICE VISIT (OUTPATIENT)
Dept: FAMILY MEDICINE CLINIC | Age: 66
End: 2021-03-12
Payer: COMMERCIAL

## 2021-03-12 VITALS
SYSTOLIC BLOOD PRESSURE: 136 MMHG | HEART RATE: 80 BPM | DIASTOLIC BLOOD PRESSURE: 80 MMHG | BODY MASS INDEX: 32.28 KG/M2 | HEIGHT: 71 IN | WEIGHT: 230.6 LBS | RESPIRATION RATE: 18 BRPM | OXYGEN SATURATION: 98 % | TEMPERATURE: 98.2 F

## 2021-03-12 DIAGNOSIS — S61.451D DOG BITE OF RIGHT HAND, SUBSEQUENT ENCOUNTER: Primary | ICD-10-CM

## 2021-03-12 DIAGNOSIS — D21.3 BENIGN NEOPLASM OF CONNECTIVE AND OTHER SOFT TISSUE OF THORAX: ICD-10-CM
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
## 2021-03-12 DIAGNOSIS — L82.1 SEBORRHEIC KERATOSES: ICD-10-CM

## 2021-03-12 PROCEDURE — 11302 SHAVE SKIN LESION 1.1-2.0 CM: CPT | Performed by: NURSE PRACTITIONER

## 2021-03-12 PROCEDURE — 11301 SHAVE SKIN LESION 0.6-1.0 CM: CPT | Performed by: NURSE PRACTITIONER

## 2021-03-12 PROCEDURE — 99213 OFFICE O/P EST LOW 20 MIN: CPT | Performed by: NURSE PRACTITIONER

## 2021-03-12 NOTE — PATIENT INSTRUCTIONS
Animal Bites: Care Instructions Your Care Instructions After an animal bite, the biggest concern is infection. The chance of infection depends on the type of animal that bit you, where on your body you were bitten, and your general health. Many animal bites are not closed with stitches, because this can increase the chance of infection. Your bite may take as little as 7 days or as long as several months to heal, depending on how bad it is. Taking good care of your wound at home will help it heal and reduce your chance of infection. The doctor has checked you carefully, but problems can develop later. If you notice any problems or new symptoms, get medical treatment right away. Follow-up care is a key part of your treatment and safety. Be sure to make and go to all appointments, and call your doctor if you are having problems. It's also a good idea to know your test results and keep a list of the medicines you take. How can you care for yourself at home? · If your doctor told you how to care for your wound, follow your doctor's instructions. If you did not get instructions, follow this general advice: ? After 24 to 48 hours, gently wash the wound with clean water 2 times a day. Do not scrub or soak the wound. Don't use hydrogen peroxide or alcohol, which can slow healing. ? You may cover the wound with a thin layer of petroleum jelly, such as Vaseline, and a nonstick bandage. ? Apply more petroleum jelly and replace the bandage as needed. · After you shower, gently dry the wound with a clean towel. · If your doctor has closed the wound, cover the bandage with a plastic bag before you take a shower. · A small amount of skin redness and swelling around the wound edges and the stitches or staples is normal. Your wound may itch or feel irritated. Do not scratch or rub the wound.  
· Ask your doctor if you can take an over-the-counter pain medicine, such as acetaminophen (Tylenol), ibuprofen (Advil, Motrin), or naproxen (Aleve). Read and follow all instructions on the label. · Do not take two or more pain medicines at the same time unless the doctor told you to. Many pain medicines have acetaminophen, which is Tylenol. Too much acetaminophen (Tylenol) can be harmful. · If your bite puts you at risk for rabies, you will get a series of shots over the next few weeks to prevent rabies. Your doctor will tell you when to get the shots. It is very important that you get the full cycle of shots. Follow your doctor's instructions exactly. · You may need a tetanus shot if you have not received one in the last 5 years. · If your doctor prescribed antibiotics, take them as directed. Do not stop taking them just because you feel better. You need to take the full course of antibiotics. When should you call for help? Call your doctor now or seek immediate medical care if: 
  · The skin near the bite turns cold or pale or it changes color.  
  · You lose feeling in the area near the bite, or it feels numb or tingly.  
  · You have trouble moving a limb near the bite.  
  · You have symptoms of infection, such as: 
? Increased pain, swelling, warmth, or redness near the wound. ? Red streaks leading from the wound. ? Pus draining from the wound. ? A fever.  
  · Blood soaks through the bandage. Oozing small amounts of blood is normal.  
  · Your pain is getting worse. Watch closely for changes in your health, and be sure to contact your doctor if you are not getting better as expected. Where can you learn more? Go to http://www.gray.com/ Enter H340 in the search box to learn more about \"Animal Bites: Care Instructions. \" Current as of: June 26, 2019               Content Version: 12.6 © 4150-0122 [employer]wise[employer]. Care instructions adapted under license by [employer] (which disclaims liability or warranty for this information).  If you have questions about a medical condition or this instruction, always ask your healthcare professional. [place] [address] any warranty or liability for your use of this information.

## 2021-03-12 NOTE — PROGRESS NOTES
Chief Complaint   Patient presents with   [place] Spina Mole     Mole removal on back       HPI:    Terry Almendarez is a 72 y.o. male. Cardiac disease: Had PCA with Dr. Alo Benavides to the Lcx and his RCA  9/2019. Hx CAD with SOB, angina. On amlodipine QHS. Chronic angina/SPRING. Saw Dr. Dawit Kimbrough last week with a good report. Dyslipidemia: TG remain elevated on Levi Troy. Polyarthralgia:  Chronic; sees rheumatology on Wednesday. Right popliteal arterial aneurysm:  Stable, under the care of Dr. Sin Cornelius. Has vascular studies ordered for later this month. New issues: Here today for follow up of dog bite to right hand. Occurred 3/6. A single puncture wound to ulnar aspect of dorsum of right hand by his own dog while playing. No further drainage. Compliant with antibiotics. Afebrile. Patient has 3 skin lesions that have been enlarging over the last 6 months. Would like to have these removed today. He notes that they bleed when picked. He is attempting to slow down at work. He has gotten the go-ahead cardiology to have bilateral shoulder replacements. Allergies   Allergen Reactions    Tetanus-Diphtheria Toxoids-Td Hives     Other reaction(s): red spot at injection site    Pcn [Penicillins] Other (comments)     Therapy ineffective         Current Outpatient Medications   Medication Sig    triamcinolone acetonide (KENALOG) 0.1 % topical cream APPLY  CREAM EXTERNALLY TO AFFECTED AREA TWICE DAILY FOR  10  TO  14  DAYS  USING  A  THIN  LAYER  ON  FACE    clotrimazole-betamethasone (LOTRISONE) topical cream APPLY  CREAM TOPICALLY TO AFFECTED AREA DAILY AS NEEDED    amoxicillin-clavulanate (AUGMENTIN) 875-125 mg per tablet Take 1 Tab by mouth two (2) times a day for 10 days.  nitroglycerin (NITROSTAT) 0.4 mg SL tablet 1 Tab by SubLINGual route every five (5) minutes as needed for Chest Pain.  Repatha SureClick pen injection INJECT 1 PEN UNDER THE SKIN EVERY 14 DAYS.     guaiFENesin ER ([place] 598) 600 mg ER tablet Take 1 Tab by mouth two (2) times a day. Indications: cold symptoms, cough    fluticasone propionate (FLONASE) 50 mcg/actuation nasal spray 2 Sprays by Both Nostrils route daily. Indications: runny nose    ipratropium (ATROVENT) 42 mcg (0.06 %) nasal spray 2 Sprays by Both Nostrils route four (4) times daily. Indications: runny nose    amLODIPine (NORVASC) 2.5 mg tablet Take 1 Tab by mouth daily.  icosapent ethyL (VASCEPA) 1 gram capsule Take 2 Caps by mouth two (2) times daily (with meals).  diclofenac (VOLTAREN) 1 % gel Apply 2 g to affected area four (4) times daily.  clopidogreL (Plavix) 75 mg tab Take 1 Tab by mouth daily. Indications: blood clot prevention following percutaneous coronary intervention    acetaminophen (Tylenol Extra Strength) 500 mg tablet Take 1,000 mg by mouth as needed for Pain.  valsartan (DIOVAN) 80 mg tablet Take 1 Tab by mouth daily.  cholecalciferol, vitamin D3, (VITAMIN D3) 2,000 unit tab Take 1 Tab by mouth daily.  naproxen sodium (ALEVE) 220 mg cap Take 1-2 Caps by mouth as needed.  esomeprazole (NEXIUM) 40 mg capsule Take 40 mg by mouth daily as needed.  aspirin delayed-release 81 mg tablet Take  by mouth daily. No current facility-administered medications for this visit. Past Medical History:   Diagnosis Date    CAD (coronary artery disease)     GERD (gastroesophageal reflux disease)     Hypercholesterolemia     Hypertension     Ill-defined condition     Reynauds       Family History   Problem Relation Age of Onset    Heart Disease Father     Hypertension Father     Elevated Lipids Father     Hypertension Mother     Cancer Maternal Grandfather         Throat    Liver Disease Paternal Grandmother     Heart Disease Paternal Grandmother     Heart Disease Paternal Grandfather        ROS:  Denies fever, chills, cough, chest pain, SOB,  nausea, vomiting, diarrhea, dysuria.  Denies rashes, + wounds, arthralgias, weakness, numbness, visual changes, depression. Denies wt loss, wt gain, hemoptysis, hematochezia or melena. Patient is not experiencing chest pain radiating to the jaw and/or down the arms. Physical Examination:    /80 (BP 1 Location: Right arm, BP Patient Position: Sitting, BP Cuff Size: Adult)   Pulse 80   Temp 98.2 °F (36.8 °C) (Temporal)   Resp 18   Ht 5' 11\" (1.803 m)   Wt 230 lb 9.6 oz (104.6 kg)   SpO2 98%   BMI 32.16 kg/m²     Wt Readings from Last 3 Encounters:   03/12/21 230 lb 9.6 oz (104.6 kg)   03/08/21 232 lb (105.2 kg)   06/22/20 229 lb (103.9 kg)         Constitutional: WDWN Male in no acute distress  HENT:  NC/AT  EYES: EOMI, PERRL  Neck:  Supple, no JVD, mass or bruit. No thyromegaly. Respiratory:  Respirations even and unlabored without use of accessory muscles, CTA throughout without wheezes, rales, rubs or rhonchi. Symmetrical chest expansion. Cardiac: RRR, no murmur  Abdomen:  +BS, soft, nontender without palpable HSM   Musculoskeletal:  No cyanosis, clubbing or edema of extremities. Moves all extremities without difficulty. Neurologic:  Smooth, even gait without assistance, CN 2-12 grossly intact. Skin: Single superficial puncture wound to ulnar aspect of right dorsum hand. No visible drainage. Small amount of erythema just around wound, but otherwise improved from 3 days ago. Cap refill <2, sensation and ROM of digits intact. Three discrete hyperpigmented, stuck-on lesions on left flank. Lymphadenopathy: no cervical or supraclavicular nodes  Psych: Pleasant and appropriate. Judgment normal. Alert and oriented x 3. ASSESSMENT AND PLAN:       ICD-10-CM ICD-9-CM    1. Dog bite of right hand, subsequent encounter  S61.451D V58.89     W54. 0XXD 882.0    2. Seborrheic keratoses  L82.1 702.19 SHAV SKIN LES 11-20MM TRUNK,ARM,LEG      SHAV SKIN LES 6-10MM TRUNK,ARM,LEG   3.  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   D21.3 215.4 SHAV SKIN LES 11-20MM TRUNK,ARM,LEG      SHAV SKIN LES 6-10MM TRUNK,ARM,LEG     Time out performed immediately prior to procedure:    Chart reviewed for the following:    *  Patient identified by name and   *  Agreement on procedure being performed  *  Risks and Benefits explained to the patient  *  Procedure site verified and marked as needed  *  Patient positioned for comfort  *  Consent was signed and verified    Time:    Date of Procedure:  2021  Procedure performed by Abeba GOODE  Assistant:  Melissa Dietrich RN, NP student  Patient tolerated procedure well  Post procedural pain scale:  0 - no hurt  Comments:  none    Procedure Note:  Shave Biopsy    Location:  Left flank  Size:  11 mmx11 mm; 7 mmx7 mm; 6 mmx6 mm    The skin directly below the lesion was infiltrated with 1 cc of 2% Lidocaine with Epi using a #23 gauge needle following chlorhexidine preps. Using a sterile razor, the lesions were removed completely in a plane parallel to the skin. The patient tolerated the procedure well. The defect was covered with a sterile bandage. Counseled patient to finish antibiotic regimen and return worsening or not improving. Patient aware of plan of care and verbalized understanding. Questions answered. RTC as above or sooner if needed.     Ji Dooley, WALE

## 2021-04-12 ENCOUNTER — TELEPHONE (OUTPATIENT)
Dept: CARDIOLOGY CLINIC | Age: 66
End: 2021-04-12

## 2021-04-12 NOTE — TELEPHONE ENCOUNTER
Pt wife Fernie Ralph  is calling to get the lab slip fax to her at [phone] if questions call her at [phone]

## 2021-04-20 ENCOUNTER — LAB ONLY (OUTPATIENT)
Dept: FAMILY MEDICINE CLINIC | Age: 66
End: 2021-04-20
Payer: MEDICARE

## 2021-04-20 DIAGNOSIS — E78.1 PURE HYPERGLYCERIDEMIA: ICD-10-CM

## 2021-04-20 DIAGNOSIS — E78.49 FAMILIAL COMBINED HYPERLIPIDEMIA: Primary | ICD-10-CM

## 2021-04-20 DIAGNOSIS — E78.5 HYPERLIPIDEMIA, UNSPECIFIED HYPERLIPIDEMIA TYPE: ICD-10-CM

## 2021-04-20 DIAGNOSIS — I25.10 CORONARY ATHEROSCLEROSIS DUE TO LIPID RICH PLAQUE: ICD-10-CM

## 2021-04-20 DIAGNOSIS — I25.83 CORONARY ATHEROSCLEROSIS DUE TO LIPID RICH PLAQUE: ICD-10-CM

## 2021-04-20 DIAGNOSIS — E78.2 MIXED HYPERLIPIDEMIA: ICD-10-CM

## 2021-04-20 DIAGNOSIS — I25.10 ATHEROSCLEROSIS OF NATIVE CORONARY ARTERY WITHOUT ANGINA PECTORIS, UNSPECIFIED WHETHER NATIVE OR TRANSPLANTED HEART: ICD-10-CM

## 2021-04-20 PROCEDURE — 36415 COLL VENOUS BLD VENIPUNCTURE: CPT | Performed by: NURSE PRACTITIONER

## 2021-04-20 NOTE — PROGRESS NOTES
Learning Assessment 3/12/2021   PRIMARY LEARNER Patient   HIGHEST LEVEL OF EDUCATION - PRIMARY LEARNER  GRADUATED HIGH SCHOOL OR GED   BARRIERS PRIMARY LEARNER NONE   CO-LEARNER CAREGIVER No   PRIMARY LANGUAGE ENGLISH   LEARNER PREFERENCE PRIMARY READING   ANSWERED BY Patient   RELATIONSHIP SELF       Pt coming in today for fasting blood work only. No complaints at this time. Blood was drawn from right arm.       Specimen sent to lab with original orders from [facility]  Fax [phone]

## 2021-04-21 LAB
25(OH)D3 SERPL-MCNC: 39.7 NG/ML (ref 30–100)
ALBUMIN SERPL-MCNC: 4.2 G/DL (ref 3.5–5)
ALBUMIN/GLOB SERPL: 1.2 {RATIO} (ref 1.1–2.2)
ALP SERPL-CCNC: 78 U/L (ref 45–117)
ALT SERPL-CCNC: 43 U/L (ref 12–78)
ANION GAP SERPL CALC-SCNC: 6 MMOL/L (ref 5–15)
AST SERPL-CCNC: 26 U/L (ref 15–37)
BILIRUB SERPL-MCNC: 0.2 MG/DL (ref 0.2–1)
BNP SERPL-MCNC: 48 PG/ML
BUN SERPL-MCNC: 13 MG/DL (ref 6–20)
BUN/CREAT SERPL: 15 (ref 12–20)
CALCIUM SERPL-MCNC: 9.2 MG/DL (ref 8.5–10.1)
CHLORIDE SERPL-SCNC: 107 MMOL/L (ref 97–108)
CHOLEST SERPL-MCNC: 113 MG/DL
CO2 SERPL-SCNC: 25 MMOL/L (ref 21–32)
CREAT SERPL-MCNC: 0.89 MG/DL (ref 0.7–1.3)
GLOBULIN SER CALC-MCNC: 3.5 G/DL (ref 2–4)
GLUCOSE SERPL-MCNC: 107 MG/DL (ref 65–100)
HDLC SERPL-MCNC: 41 MG/DL
HDLC SERPL: 2.8 {RATIO} (ref 0–5)
LDLC SERPL CALC-MCNC: 12 MG/DL (ref 0–100)
LDLC SERPL DIRECT ASSAY-MCNC: 22 MG/DL (ref 0–100)
LIPID PROFILE,FLP: ABNORMAL
MAGNESIUM SERPL-MCNC: 2.1 MG/DL (ref 1.6–2.4)
POTASSIUM SERPL-SCNC: 4 MMOL/L (ref 3.5–5.1)
PROT SERPL-MCNC: 7.7 G/DL (ref 6.4–8.2)
SODIUM SERPL-SCNC: 138 MMOL/L (ref 136–145)
TRIGL SERPL-MCNC: 300 MG/DL (ref ?–150)
VLDLC SERPL CALC-MCNC: 60 MG/DL

## 2021-05-17 ENCOUNTER — TELEPHONE (OUTPATIENT)
Dept: CARDIOLOGY CLINIC | Age: 66
End: 2021-05-17

## 2021-05-17 RX ORDER — EVOLOCUMAB 140 MG/ML
INJECTION, SOLUTION SUBCUTANEOUS
Qty: 6 PEN | Refills: 3 | Status: SHIPPED | OUTPATIENT
Start: 2021-05-17

## 2021-05-17 NOTE — TELEPHONE ENCOUNTER
Patient's wife, Jennifer Dorman, states patient is completely out of repatha and needs a refill but [facility] no longer offers it. States she is unsure who will be able to. Also requesting recent lab results.      Phone: [phone]

## 2021-05-17 NOTE — TELEPHONE ENCOUNTER
LVM on spouse confidential phone. Kelvin sent to [facility] on file. Labs sent via [facility] previously. Lab numbers give. She may return call with any questions.      Lab Results   Component Value Date/Time    Cholesterol, total 113 04/20/2021 02:30 PM    HDL Cholesterol 41 04/20/2021 02:30 PM    LDL,Direct 22 04/20/2021 02:30 PM    LDL, calculated 12 04/20/2021 02:30 PM    VLDL, calculated 60 04/20/2021 02:30 PM    Triglyceride 300 (H) 04/20/2021 02:30 PM    CHOL/HDL Ratio 2.8 04/20/2021 02:30 PM

## 2021-07-07 ENCOUNTER — OFFICE VISIT (OUTPATIENT)
Dept: CARDIOLOGY CLINIC | Age: 66
End: 2021-07-07
Payer: COMMERCIAL

## 2021-07-07 ENCOUNTER — ANCILLARY PROCEDURE (OUTPATIENT)
Dept: CARDIOLOGY CLINIC | Age: 66
End: 2021-07-07
Payer: COMMERCIAL

## 2021-07-07 VITALS
SYSTOLIC BLOOD PRESSURE: 122 MMHG | HEIGHT: 71 IN | HEART RATE: 70 BPM | DIASTOLIC BLOOD PRESSURE: 84 MMHG | BODY MASS INDEX: 32.2 KG/M2 | WEIGHT: 230 LBS | OXYGEN SATURATION: 97 % | RESPIRATION RATE: 13 BRPM

## 2021-07-07 VITALS
HEIGHT: 71 IN | SYSTOLIC BLOOD PRESSURE: 136 MMHG | BODY MASS INDEX: 30.52 KG/M2 | WEIGHT: 218 LBS | DIASTOLIC BLOOD PRESSURE: 80 MMHG

## 2021-07-07 DIAGNOSIS — I25.118 CORONARY ARTERY DISEASE OF NATIVE ARTERY OF NATIVE HEART WITH STABLE ANGINA PECTORIS (HCC): Primary | ICD-10-CM

## 2021-07-07 DIAGNOSIS — R06.02 SOB (SHORTNESS OF BREATH): ICD-10-CM

## 2021-07-07 DIAGNOSIS — I25.10 CORONARY ARTERY DISEASE DUE TO LIPID RICH PLAQUE: ICD-10-CM

## 2021-07-07 DIAGNOSIS — I21.4 NON-ST ELEVATION (NSTEMI) MYOCARDIAL INFARCTION (HCC): ICD-10-CM

## 2021-07-07 DIAGNOSIS — E78.1 HYPERTRIGLYCERIDEMIA: ICD-10-CM

## 2021-07-07 DIAGNOSIS — I10 ESSENTIAL HYPERTENSION: ICD-10-CM

## 2021-07-07 DIAGNOSIS — E78.49 FAMILIAL HYPERLIPIDEMIA, HIGH LDL: ICD-10-CM

## 2021-07-07 DIAGNOSIS — I25.83 CORONARY ARTERY DISEASE DUE TO LIPID RICH PLAQUE: ICD-10-CM

## 2021-07-07 PROCEDURE — 99214 OFFICE O/P EST MOD 30 MIN: CPT | Performed by: INTERNAL MEDICINE

## 2021-07-07 PROCEDURE — 93010 ELECTROCARDIOGRAM REPORT: CPT | Performed by: INTERNAL MEDICINE

## 2021-07-07 PROCEDURE — 93005 ELECTROCARDIOGRAM TRACING: CPT | Performed by: INTERNAL MEDICINE

## 2021-07-07 PROCEDURE — G8752 SYS BP LESS 140: HCPCS | Performed by: INTERNAL MEDICINE

## 2021-07-07 PROCEDURE — G8510 SCR DEP NEG, NO PLAN REQD: HCPCS | Performed by: INTERNAL MEDICINE

## 2021-07-07 PROCEDURE — G8417 CALC BMI ABV UP PARAM F/U: HCPCS | Performed by: INTERNAL MEDICINE

## 2021-07-07 PROCEDURE — G8754 DIAS BP LESS 90: HCPCS | Performed by: INTERNAL MEDICINE

## 2021-07-07 PROCEDURE — 1101F PT FALLS ASSESS-DOCD LE1/YR: CPT | Performed by: INTERNAL MEDICINE

## 2021-07-07 PROCEDURE — G0463 HOSPITAL OUTPT CLINIC VISIT: HCPCS | Performed by: INTERNAL MEDICINE

## 2021-07-07 PROCEDURE — G8536 NO DOC ELDER MAL SCRN: HCPCS | Performed by: INTERNAL MEDICINE

## 2021-07-07 PROCEDURE — 3017F COLORECTAL CA SCREEN DOC REV: CPT | Performed by: INTERNAL MEDICINE

## 2021-07-07 PROCEDURE — G8427 DOCREV CUR MEDS BY ELIG CLIN: HCPCS | Performed by: INTERNAL MEDICINE

## 2021-07-07 PROCEDURE — 93306 TTE W/DOPPLER COMPLETE: CPT | Performed by: INTERNAL MEDICINE

## 2021-07-07 RX ORDER — BISMUTH SUBSALICYLATE 262 MG
2 TABLET,CHEWABLE ORAL DAILY
COMMUNITY

## 2021-07-07 NOTE — PROGRESS NOTES
[facility] [place]: Jose Raul Presley  [phone]  Seen by Dr. Shazia Prieto in the past     Alla Mart is a 72 y.o. male who was seen today for followup of his cad and chf    He doesn't feel well after doing strenuous exercise. Normal exercise is ok. Everything seems to be stable. Ex moving furniture will make his feel badly. Short winded, chest feels bad stops a bit rests and gets back to it. Not using or needing the ntg. He is eating differently and that may be helping. He was having so much leg pain he couldn't sleep and everything. Went to [facility] to use laser and red light therapy with a radical diet, cutting out sugar etc, cutting fruits etc.   It seems to have worked for him.   hed like to see the results with labs. Still tired and not a lot of energy but really seems to be hanging in there. Lost 12-15 pounds. Stable on our scale today. He is working more than he'd like to but overall he feels like he is doing better, overall his stamina is better. His wife says he is complaining the same but that he is actually doing better. BP running 152/91, pulse 72   Rare flutters but not too bad. A little out of breath if he overdoes it but recovers quickly. But overall he seems to be feeling better than he was on more medication with lower BP    He has been working a lot more recently and wants to work towards retiring. He went to Dr Dwight Ocasio and got cortisone shots in the shoulder. He is considering moving forward with his shoulder replacement surgeries, we discussed that this is a good time from cv standpoint- he feels well, and is a year out from last PCI to interrupt plavix. He hasn't had to take any ntg in the last few months.      Follows with Rheum now  Popliteal aneurysm appt, vascular followup  Fax lab order to [phone]    Way overdue for labs, insurance wasn't cooperating, but seems to be ok to do it now, will add cbc and he will get them done.    ASSESSMENT   Fatigue continues  Claudication and leg pain- seeing Dr. Amena Hill next week  /79 so it is doing ok, sometimes lower 130/80. PLAN   1. CAD with  angina/SPRING -   -treatment failure with Ranexa, had headaches with Imdur, previously treated with diltiazem, insufficient relief, fatigue  -changed from Toprol XL to bystolic for fatigue, cont low dose bystolic and the amlodipine.   -he is not taking L-arginine   -s/p  opening with Dr. Sofía Gaytan 9/19  -spring and fatigue continue, echo ok, monitor ok rhythm/bradycardia/sss  2. HTN - at goal on current regimen   3. Dyslipidemia- Lipids uncontrolled 12/19 with tg 591, needs recheck  -continue repatha, will check fasting lipids now  -TG > 600 now the past, previously improved with repatha, if not getting to goal will add vascepa  -has tried zetia, niacin, statins, fenofibrate  -failed pravastatin, simvastatin, rosuvastatin, fluvastatin - see records scanned from pharmacy regarding trials of meds. -could not take livalo due to nausea and severe leg cramps.   -ldl down to 12, will recheck no plans to reduce or stop repatha  4. LANI- betamethasone to nose/forehead  5. Vitamin D deficiency - will check Vitamin D level   6. CHF EF 45-50% on echo today.      Cath 10/16 LM ok LAD ok D1 ok D2 ok LCx mod 40-50% diseae prox. Lg RCA with large PDA/PBL filling from L-R collaterals  WILD ok in 2015  Cath 6/11 LM ok LAD 10% LCx 10% RCA mod size mid occlusion, with L-R collaterals  Stress 10/09 EF 43% inferior infarct, ischemia at [facility]     Soc no tob no etoh  FHX hx cad, htn dad     He  has a past medical history of CAD (coronary artery disease), GERD (gastroesophageal reflux disease), Hypercholesterolemia, Hypertension, and Ill-defined condition. Cardiovascular ROS: positive for - chest pain and dyspnea on exertion  Respiratory ROS: positive for - shortness of breath  Neurological ROS: no TIA or stroke symptoms  All other systems negative except as above. PE  Vitals:    07/07/21 1129   BP: 122/84   Pulse: 70   Resp: 13   SpO2: 97%   Weight: 230 lb (104.3 kg)   Height: 5' 11\" (1.803 m)    Body mass index is 32.08 kg/m².    General appearance - alert, well appearing, and in no distress  Mental status - affect appropriate to mood  Eyes - sclera anicteric, moist mucous membranes  Neck - supple, no carotid bruits   Lymphatics - not assessed   Chest - clear to auscultation, no wheezes, rales or rhonchi  Heart - normal rate, regular rhythm, normal S1, S2, no murmurs, rubs, clicks or gallops  Abdomen - soft, nontender, nondistended  Back exam - full range of motion, no tenderness  Neurological - cranial nerves II through XII grossly intact, no focal deficit  Musculoskeletal - no muscular tenderness noted, normal strength  Extremities - peripheral pulses normal, no pedal edema  Skin - normal coloration  no rashes    Recent Labs:  Lab Results   Component Value Date/Time    Cholesterol, total 113 04/20/2021 02:30 PM    HDL Cholesterol 41 04/20/2021 02:30 PM    LDL,Direct 22 04/20/2021 02:30 PM    LDL, calculated 12 04/20/2021 02:30 PM    Triglyceride 300 (H) 04/20/2021 02:30 PM    CHOL/HDL Ratio 2.8 04/20/2021 02:30 PM     Lab Results   Component Value Date/Time    Creatinine 0.89 04/20/2021 02:30 PM     Lab Results   Component Value Date/Time    BUN 13 04/20/2021 02:30 PM     Lab Results   Component Value Date/Time    Potassium 4.0 04/20/2021 02:30 PM     Lab Results   Component Value Date/Time    Hemoglobin A1c 5.9 (H) 10/25/2016 08:10 AM     Lab Results   Component Value Date/Time    HGB 15.4 10/21/2020 12:00 AM     Lab Results   Component Value Date/Time    PLATELET 901 23/94/6689 12:00 AM       Reviewed:  Past Medical History:   Diagnosis Date    CAD (coronary artery disease)     GERD (gastroesophageal reflux disease)     Hypercholesterolemia     Hypertension     Ill-defined condition     Reynauds     Social History     Tobacco Use   Smoking Status Former Smoker  Types: Cigarettes   Smokeless Tobacco Never Used   Tobacco Comment    quit 40 years ago     Social History     Substance and Sexual Activity   Alcohol Use Yes    Comment: occasional     Allergies   Allergen Reactions    Tetanus-Diphtheria Toxoids-Td Hives     Other reaction(s): red spot at injection site    Pcn [Penicillins] Other (comments)     Therapy ineffective         Current Outpatient Medications   Medication Sig    multivitamin (ONE A DAY) tablet Take 2 Tablets by mouth daily.  evolocumab (Repatha SureClick) pen injection INJECT 1 PEN UNDER THE SKIN EVERY 14 DAYS.  triamcinolone acetonide (KENALOG) 0.1 % topical cream APPLY  CREAM EXTERNALLY TO AFFECTED AREA TWICE DAILY FOR  10  TO  14  DAYS  USING  A  THIN  LAYER  ON  FACE    clotrimazole-betamethasone (LOTRISONE) topical cream APPLY  CREAM TOPICALLY TO AFFECTED AREA DAILY AS NEEDED    nitroglycerin (NITROSTAT) 0.4 mg SL tablet 1 Tab by SubLINGual route every five (5) minutes as needed for Chest Pain.  amLODIPine (NORVASC) 2.5 mg tablet Take 1 Tab by mouth daily.  icosapent ethyL (VASCEPA) 1 gram capsule Take 2 Caps by mouth two (2) times daily (with meals).  diclofenac (VOLTAREN) 1 % gel Apply 2 g to affected area four (4) times daily.  clopidogreL (Plavix) 75 mg tab Take 1 Tab by mouth daily. Indications: blood clot prevention following percutaneous coronary intervention    acetaminophen (Tylenol Extra Strength) 500 mg tablet Take 1,000 mg by mouth as needed for Pain.  valsartan (DIOVAN) 80 mg tablet Take 1 Tab by mouth daily.  cholecalciferol, vitamin D3, (VITAMIN D3) 2,000 unit tab Take 1 Tab by mouth daily.  naproxen sodium (ALEVE) 220 mg cap Take 1-2 Caps by mouth as needed.  esomeprazole (NEXIUM) 40 mg capsule Take 40 mg by mouth daily as needed.  aspirin delayed-release 81 mg tablet Take  by mouth daily.  guaiFENesin ER (MUCINEX) 600 mg ER tablet Take 1 Tab by mouth two (2) times a day.  Indications: cold symptoms, cough (Patient not taking: Reported on 7/7/2021)    fluticasone propionate (FLONASE) 50 mcg/actuation nasal spray 2 Sprays by Both Nostrils route daily. Indications: runny nose (Patient not taking: Reported on 7/7/2021)    ipratropium (ATROVENT) 42 mcg (0.06 %) nasal spray 2 Sprays by Both Nostrils route four (4) times daily. Indications: runny nose (Patient not taking: Reported on 7/7/2021)     No current facility-administered medications for this visit.        Eboni Blakely MD  [facility] and V[facility] [place]  [address] [facility] [address]  [address]

## 2021-07-07 NOTE — Clinical Note
Hi there! I saw this mutual patient today, he is finally ontrack with healthy lifestyle changes- ldl was down to 12 last check and he wanted to recheck his tg today. I may cut his vascepa down or stop if he has been able to get the tg down but I will continue the repatha at current dosages.  Call me at your convenience, Jorge Comment [phone]

## 2021-07-09 LAB
ECHO AO ASC DIAM: 3.54 CM
ECHO AO ROOT DIAM: 3.49 CM
ECHO AV AREA PEAK VELOCITY: 3.49 CM2
ECHO AV AREA VTI: 3.28 CM2
ECHO AV AREA/BSA PEAK VELOCITY: 1.6 CM2/M2
ECHO AV AREA/BSA VTI: 1.5 CM2/M2
ECHO AV MEAN GRADIENT: 2.07 MMHG
ECHO AV PEAK GRADIENT: 4.8 MMHG
ECHO AV PEAK VELOCITY: 109.58 CM/S
ECHO AV VTI: 20.42 CM
ECHO EST RA PRESSURE: 3 MMHG
ECHO IVC PROX: 1.43 CM
ECHO LA AREA 4C: 20.89 CM2
ECHO LA MAJOR AXIS: 3.09 CM
ECHO LA MINOR AXIS: 1.38 CM
ECHO LA VOL 2C: 64.31 ML (ref 18–58)
ECHO LA VOL 4C: 64.19 ML (ref 18–58)
ECHO LA VOL BP: 69.45 ML (ref 18–58)
ECHO LA VOL/BSA BIPLANE: 31 ML/M2 (ref 16–28)
ECHO LA VOLUME INDEX A2C: 28.71 ML/M2 (ref 16–28)
ECHO LA VOLUME INDEX A4C: 28.66 ML/M2 (ref 16–28)
ECHO LV E' LATERAL VELOCITY: 6.42 CM/S
ECHO LV E' SEPTAL VELOCITY: 4.2 CM/S
ECHO LV EDV A2C: 170.58 ML
ECHO LV EDV A4C: 194.02 ML
ECHO LV EDV BP: 191.12 ML (ref 67–155)
ECHO LV EDV INDEX A4C: 86.6 ML/M2
ECHO LV EDV INDEX BP: 85.3 ML/M2
ECHO LV EDV NDEX A2C: 76.2 ML/M2
ECHO LV EJECTION FRACTION A2C: 46 PERCENT
ECHO LV EJECTION FRACTION A4C: 51 PERCENT
ECHO LV EJECTION FRACTION BIPLANE: 45.3 PERCENT (ref 55–100)
ECHO LV ESV A2C: 92.44 ML
ECHO LV ESV A4C: 94.91 ML
ECHO LV ESV BP: 104.46 ML (ref 22–58)
ECHO LV ESV INDEX A2C: 41.3 ML/M2
ECHO LV ESV INDEX A4C: 42.4 ML/M2
ECHO LV ESV INDEX BP: 46.6 ML/M2
ECHO LV INTERNAL DIMENSION DIASTOLIC: 4.47 CM (ref 4.2–5.9)
ECHO LV INTERNAL DIMENSION SYSTOLIC: 3.42 CM
ECHO LV IVSD: 1.13 CM (ref 0.6–1)
ECHO LV MASS 2D: 168.8 G (ref 88–224)
ECHO LV MASS INDEX 2D: 75.3 G/M2 (ref 49–115)
ECHO LV POSTERIOR WALL DIASTOLIC: 1.03 CM (ref 0.6–1)
ECHO LVOT DIAM: 2.4 CM
ECHO LVOT PEAK GRADIENT: 2.85 MMHG
ECHO LVOT PEAK VELOCITY: 84.36 CM/S
ECHO LVOT SV: 66.9 ML
ECHO LVOT VTI: 14.76 CM
ECHO MV A VELOCITY: 97.22 CM/S
ECHO MV AREA PHT: 4.29 CM2
ECHO MV E DECELERATION TIME (DT): 176.9 MS
ECHO MV E VELOCITY: 47.16 CM/S
ECHO MV E/A RATIO: 0.49
ECHO MV E/E' LATERAL: 7.35
ECHO MV E/E' RATIO (AVERAGED): 9.29
ECHO MV E/E' SEPTAL: 11.23
ECHO MV PRESSURE HALF TIME (PHT): 51.3 MS
ECHO RA MAJOR AXIS: 4.46 CM
ECHO RIGHT VENTRICULAR SYSTOLIC PRESSURE (RVSP): 26 MMHG
ECHO RV INTERNAL DIMENSION: 4.11 CM
ECHO RV TAPSE: 2.05 CM (ref 1.5–2)
ECHO TV REGURGITANT MAX VELOCITY: 241.51 CM/S
ECHO TV REGURGITANT PEAK GRADIENT: 23.33 MMHG
LA VOL DISK BP: 64.94 ML (ref 18–58)
LVOT MG: 1.45 MMHG

## 2021-08-02 ENCOUNTER — CLINICAL SUPPORT (OUTPATIENT)
Dept: FAMILY MEDICINE CLINIC | Age: 66
End: 2021-08-02
Payer: MEDICARE

## 2021-08-02 VITALS — TEMPERATURE: 97.3 F

## 2021-08-02 DIAGNOSIS — I25.10 CORONARY ATHEROSCLEROSIS DUE TO LIPID RICH PLAQUE: Primary | ICD-10-CM

## 2021-08-02 DIAGNOSIS — E78.2 MIXED HYPERLIPIDEMIA: ICD-10-CM

## 2021-08-02 DIAGNOSIS — I10 ESSENTIAL HYPERTENSION, BENIGN: ICD-10-CM

## 2021-08-02 DIAGNOSIS — I25.83 CORONARY ATHEROSCLEROSIS DUE TO LIPID RICH PLAQUE: Primary | ICD-10-CM

## 2021-08-02 PROCEDURE — 36415 COLL VENOUS BLD VENIPUNCTURE: CPT | Performed by: NURSE PRACTITIONER

## 2021-08-02 NOTE — PROGRESS NOTES
Mr. Selena Ricks presented to the office for BW via right arm venipuncture without any complications, 2 gold tops were sent, refrigerator. An order was brought in by the patient and results should be sent to Dr. Faiza Willams, [facility], [id number] [place] [address], P- [phone].

## 2021-08-03 LAB
ALBUMIN SERPL-MCNC: 3.8 G/DL (ref 3.5–5)
ALBUMIN/GLOB SERPL: 1.2 {RATIO} (ref 1.1–2.2)
ALP SERPL-CCNC: 68 U/L (ref 45–117)
ALT SERPL-CCNC: 33 U/L (ref 12–78)
ANION GAP SERPL CALC-SCNC: 5 MMOL/L (ref 5–15)
AST SERPL-CCNC: 22 U/L (ref 15–37)
BILIRUB SERPL-MCNC: 0.2 MG/DL (ref 0.2–1)
BUN SERPL-MCNC: 16 MG/DL (ref 6–20)
BUN/CREAT SERPL: 20 (ref 12–20)
CALCIUM SERPL-MCNC: 9.2 MG/DL (ref 8.5–10.1)
CHLORIDE SERPL-SCNC: 111 MMOL/L (ref 97–108)
CHOLEST SERPL-MCNC: 111 MG/DL
CO2 SERPL-SCNC: 25 MMOL/L (ref 21–32)
COMMENT, HOLDF: NORMAL
CREAT SERPL-MCNC: 0.79 MG/DL (ref 0.7–1.3)
GLOBULIN SER CALC-MCNC: 3.2 G/DL (ref 2–4)
GLUCOSE SERPL-MCNC: 103 MG/DL (ref 65–100)
HDLC SERPL-MCNC: 40 MG/DL
HDLC SERPL: 2.8 {RATIO} (ref 0–5)
LDLC SERPL CALC-MCNC: 11.4 MG/DL (ref 0–100)
MAGNESIUM SERPL-MCNC: 2.1 MG/DL (ref 1.6–2.4)
POTASSIUM SERPL-SCNC: 4 MMOL/L (ref 3.5–5.1)
PROT SERPL-MCNC: 7 G/DL (ref 6.4–8.2)
SAMPLES BEING HELD,HOLD: NORMAL
SODIUM SERPL-SCNC: 141 MMOL/L (ref 136–145)
TRIGL SERPL-MCNC: 298 MG/DL (ref ?–150)
VLDLC SERPL CALC-MCNC: 59.6 MG/DL

## 2021-08-31 RX ORDER — CLOPIDOGREL BISULFATE 75 MG/1
TABLET ORAL
Qty: 90 TABLET | Refills: 1 | Status: SHIPPED | OUTPATIENT
Start: 2021-08-31

## 2021-08-31 NOTE — TELEPHONE ENCOUNTER
Requested Prescriptions     Signed Prescriptions Disp Refills    clopidogreL (PLAVIX) 75 mg tab 90 Tablet 1     Sig: TAKE 1 TABLET BY MOUTH ONCE DAILY AS  INDICATED  FOR  BLOOD  CLOT  PREVENTION     Authorizing Provider: Ragini Soares     Ordering User: Navneet Carrasquillo     Per verbal orders

## 2022-01-07 ENCOUNTER — DOCUMENTATION ONLY (OUTPATIENT)
Dept: CARDIOLOGY | Age: 67
End: 2022-01-07

## 2022-01-07 NOTE — PROGRESS NOTES
PCP: Suman Hernández NP     45 min total time    HPI: Yue Woodward is a 72 y.o. male who was seen today for follow up of his cad and chf. Last seen 6 months ago. He reports some shortness of breath upon exertion, which he states is pretty normal and not any worse than previous visits. He reports swelling all the time in his legs and feet. He is still working in [employer], but trying to slow down more. 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 He feels like he overdid it yesterday, running around in the snow. He needs some time off according to his wife. She think it is exhaustion. He feels like he felt the same a week ago and a month ago and that it is not changing. He gets just overdone and work out if he works more than 3-4 hours. Not installing or moving big equipment, he is teaching in [facility] and loves that. But has to dive all the way there. He has been doing two nights a week and plans to go to 4. We discussed how he really seems to be more tired and easily exhausted that prior and that this could represent an anginal equivalent. Prior to proceeding with upcoming surgeries, will evaluate with further cardiac testing. He doesn't feel well after doing strenuous exercise. Normal exercise is ok. Everything seems to be stable. Ex moving furniture will make his feel badly. Short winded, chest feels bad stops a bit rests and gets back to it. Not using or needing the ntg. He went to Dr Liu Roman and got cortisone shots in the shoulder. He is considering moving forward with his shoulder replacement surgeries, sounds like intense surgery, we discussed that this is a good time from cv standpoint- he feels well, and is a year out from last PCI to interrupt plavix. Plans to move forward. He hasn't had to take any ntg in the last few months.       Follows with Rheum now  Popliteal aneurysm appt, vascular followup  Fax lab order to [phone]     Fatigue continues  Claudication and leg pain- seeing Dr. Tyrone De León, overdue to followup. He wanted to do a CTA with runoff to look at the Aorta and legs again. /79 so it is doing ok, sometimes lower 130/80. Assessment/Plan  1. CAD with  angina/ROSALES -   -treatment failure with Ranexa, had headaches with Imdur, previously treated with diltiazem, insufficient relief, fatigue  -changed from Toprol XL to bystolic for fatigue, cont low dose bystolic and the amlodipine.   -he is not taking L-arginine   -s/p  opening with Dr. Jordana Dai 9/19  -rosales and fatigue continue, echo ok, monitor ok rhythm/bradycardia/sss  2. HTN - at goal on current regimen   3. Dyslipidemia- difficult, tg as high as 600  -continue repatha, vascepa   -has tried zetia, niacin, statins, fenofibrate  -failed pravastatin, simvastatin, rosuvastatin, fluvastatin    -could not take livalo due to nausea and severe leg cramps. 4. LANI- betamethasone to nose/forehead  5. Vitamin D deficiency - goal 30  6. CHF EF 45-50% on echo , recheck     7. Preop clearance- repeat nuc and echo given progressive fatigue, complex cad. Echo 7/21 EF45-50, APRIL, PAP 28 aov sclerosis without stenosis gridd    WILD 10/21 nl WILD r pop aneurysm- 3.1cm    Cath 6/19  prox % 2. 5x38 Xience    8/19 cath nl LM, LALD, OM1 85% rx with 2.75x15 Xience    Nuc 6/19 nl nuc EF 52%    Cath 10/16 LM ok LAD ok D1 ok D2 ok LCx mod 40-50% diseae prox. Lg RCA with large PDA/PBL filling from L-R collaterals  WILD ok in 2015  Cath 6/11 LM ok LAD 10% LCx 10% RCA mod size mid occlusion, with L-R collaterals  Stress 10/09 EF 43% inferior infarct, ischemia at hospitals     Soc no tob no etoh  FHX hx cad, htn dad      He  has a past medical history of CAD (coronary artery disease), GERD (gastroesophageal reflux disease), Hypercholesterolemia, Hypertension, and Ill-defined condition.      Cardiovascular ROS: positive for - chest pain and dyspnea on exertion  Respiratory ROS: positive for - shortness of breath  Neurological ROS: no TIA or stroke symptoms  All other systems negative except as above.

## 2022-01-18 ENCOUNTER — TELEPHONE (OUTPATIENT)
Dept: FAMILY MEDICINE CLINIC | Age: 67
End: 2022-01-18

## 2022-01-18 ENCOUNTER — VIRTUAL VISIT (OUTPATIENT)
Dept: FAMILY MEDICINE CLINIC | Age: 67
End: 2022-01-18
Payer: COMMERCIAL

## 2022-01-18 VITALS — TEMPERATURE: 98.2 F

## 2022-01-18 DIAGNOSIS — J06.9 VIRAL UPPER RESPIRATORY TRACT INFECTION: Primary | ICD-10-CM

## 2022-01-18 PROCEDURE — 99441 PR PHYS/QHP TELEPHONE EVALUATION 5-10 MIN: CPT | Performed by: NURSE PRACTITIONER

## 2022-01-18 NOTE — PROGRESS NOTES
"Subjective:       Patient ID: Chioma Wall is a 31 y.o. female.    Chief Complaint:  Well Woman (pap nl/hpv- 2018 )      History of Present Illness  - here for annual. C/o bilateral breast soreness prior to period that lingered this month after period ended. Wears sports bra when cycling. No nipple discharge, skin change, mass.  - taking Valtrex 500 mg daily to prevent cold sores.  - would like to restart ocps for acne; requests "pill with diuretic in it." Stopped ocps because thought that she gained weight, had lower libido, and was emotional due to ocps. No real change since stopping them. Has started exercising which she thinks has helped.  - moving to [place] this month as her partner (who has lupus) is starting an Int Med residency.      Past Medical History:   Diagnosis Date    Abnormal Pap smear of cervix     +HPV, normal since    BRCA gene mutation negative in female     Fibroadenoma of left breast in female 2014    - biopsy benign    History of HPV infection     resolved    HSV (herpes simplex virus) anogenital infection     Migraine headache     rare       Past Surgical History:   Procedure Laterality Date    BREAST BIOPSY Left     benign fibroadenoma    WISDOM TOOTH EXTRACTION           Current Outpatient Medications:     spironolactone (ALDACTONE) 50 MG tablet, TK 1 T PO BID, Disp: , Rfl: 2    valACYclovir (VALTREX) 1000 MG tablet, Take 1 tablet (1,000 mg total) by mouth once daily. Schedule annual exam for additional refills, Disp: 90 tablet, Rfl: 0    drospirenone-ethinyl estradiol (ERIBERTO) 3-0.02 mg per tablet, Take 1 tablet by mouth once daily., Disp: 84 tablet, Rfl: 3    Review of patient's allergies indicates:  No Known Allergies    GYN & OB History  Patient's last menstrual period was 2019.   Date of Last Pap: 2018    OB History    Para Term  AB Living   0 0 0 0 0 0   SAB TAB Ectopic Multiple Live Births   0 0 0 0     Obstetric Comments   Family hx " Charley Garrison is a 77 y.o. male presenting for/with:    Chief Complaint   Patient presents with    Cough     dry cough, chest soreness, SOB. Started last night.  Nasal Congestion     runny nose    Fatigue     no fevers       Visit Vitals  Temp 98.2 °F (36.8 °C)     Pain Scale: /10  Pain Location:     1. Have you been to the ER, urgent care clinic since your last visit? Hospitalized since your last visit? NO    2. Have you seen or consulted any other health care providers outside of the [address] [place] [address] since your last visit? Include any pap smears or colon screening. YES (cardiology)    Symptom review:  NO  Fever   NO  Shaking chills  NO  Cough  NO  Body aches  NO  Coughing up blood  NO  Chest congestion  NO  Chest pain  NO  Shortness of breath  NO  Profound Loss of smell/taste  NO  Nausea/Vomiting   NO  Loose stool/Diarrhea  NO  any skin issues    Patient Risk Factors Reviewed as follows:  NO  have you been in Close contact with confirmed COVID19 patient   NO  History of recent travel to affected geographical areas within the past 14 days  NO  COPD  NO  Active Cancer/Leukemia/Lymphoma/Chemotherapy  NO  Oral steroid use  NO  Pregnant  NO  Diabetes Mellitus  NO  Heart disease  NO  Asthma  NO Health care worker at home  NO Health care worker  NO Is there a Pregnant Woman in the home  NO Dialysis pt in the home   NO a large number of people living in the home    Learning Assessment 3/12/2021   PRIMARY LEARNER Patient   HIGHEST LEVEL OF EDUCATION - PRIMARY LEARNER  GRADUATED HIGH SCHOOL OR GED   BARRIERS PRIMARY LEARNER NONE   CO-LEARNER CAREGIVER No   PRIMARY LANGUAGE ENGLISH   LEARNER PREFERENCE PRIMARY READING   ANSWERED BY Patient   RELATIONSHIP SELF     Fall Risk Assessment, last 12 mths 1/18/2022   Able to walk? Yes   Fall in past 12 months? 0   Do you feel unsteady? 0   Are you worried about falling 0   Number of falls in past 12 months -   Fall with injury?  -       3 most recent PHQ Screens 1/18/2022   Little interest or pleasure in doing things Not at all   Feeling down, depressed, irritable, or hopeless Not at all   Total Score PHQ 2 0     Abuse Screening Questionnaire 1/18/2022   Do you ever feel afraid of your partner? N   Are you in a relationship with someone who physically or mentally threatens you? N   Is it safe for you to go home? Y       ADL Assessment 1/18/2022   Feeding yourself No Help Needed   Getting from bed to chair No Help Needed   Getting dressed No Help Needed   Bathing or showering No Help Needed   Walk across the room (includes cane/walker) No Help Needed   Using the telphone No Help Needed   Taking your medications No Help Needed   Preparing meals No Help Needed   Managing money (expenses/bills) No Help Needed   Moderately strenuous housework (laundry) No Help Needed   Shopping for personal items (toiletries/medicines) No Help Needed   Shopping for groceries No Help Needed   Driving No Help Needed   Climbing a flight of stairs No Help Needed   Getting to places beyond walking distances No Help Needed      No advanced directives on file. Verified emergency contact. of breast cancer for maternal grandmother and 2 maternal great aunts   Age at menarche 11       Social History     Socioeconomic History    Marital status: Single     Spouse name: Not on file    Number of children: Not on file    Years of education: Not on file    Highest education level: Not on file   Occupational History    Not on file   Social Needs    Financial resource strain: Not on file    Food insecurity:     Worry: Not on file     Inability: Not on file    Transportation needs:     Medical: Not on file     Non-medical: Not on file   Tobacco Use    Smoking status: Never Smoker    Smokeless tobacco: Never Used   Substance and Sexual Activity    Alcohol use: Yes     Frequency: 2-4 times a month     Drinks per session: 1 or 2     Comment: weekend/social    Drug use: No    Sexual activity: Yes     Partners: Male     Birth control/protection: None, Condom   Lifestyle    Physical activity:     Days per week: Not on file     Minutes per session: Not on file    Stress: Not on file   Relationships    Social connections:     Talks on phone: Not on file     Gets together: Not on file     Attends [employer] service: Not on file     Active member of club or organization: Not on file     Attends meetings of clubs or organizations: Not on file     Relationship status: Not on file   Other Topics Concern    Not on file   Social History Narrative    Not on file       Family History   Problem Relation Age of Onset    Breast cancer Maternal Grandmother 78    Multiple myeloma Maternal Grandmother     Heart attack Maternal Grandfather 45        x2    Hyperlipidemia Maternal Grandfather     No Known Problems Father     Hyperlipidemia Mother     Glaucoma Mother         glaucoma suspect    Cancer Paternal Grandfather         liver? smoker    Cataracts Paternal Grandmother     Mental illness Sister     Other Sister         bulimia    Eating disorder Sister         bulimia and anorexia    Other Sister      "    borderline personality disorder    Glaucoma Maternal Aunt         glaucoma suspect    Colon cancer Neg Hx     Hypertension Neg Hx     Ovarian cancer Neg Hx     Stroke Neg Hx     Macular degeneration Neg Hx        Review of Systems  Review of Systems   Respiratory: Negative for shortness of breath.    Cardiovascular: Negative for chest pain and palpitations.   Gastrointestinal: Negative for blood in stool, nausea and vomiting.   Genitourinary:        - see HPI   Skin: Negative for rash and wound.   Allergic/Immunologic: Negative for immunocompromised state.   Neurological: Negative for dizziness and syncope.   Hematological: Negative for adenopathy.   Psychiatric/Behavioral: Negative for behavioral problems.        Objective:     Vitals:    12/12/19 1500   BP: 120/74   Weight: 76.1 kg (167 lb 10.6 oz)   Height: 5' 1" (1.549 m)       Physical Exam:   Constitutional: She is oriented to person, place, and time. She appears well-developed and well-nourished.        Pulmonary/Chest: Right breast exhibits no mass, no nipple discharge, no skin change, no tenderness and no swelling. Left breast exhibits no mass, no nipple discharge, no skin change, no tenderness and no swelling. Breasts are symmetrical.        Abdominal: Soft. She exhibits no distension. There is no tenderness.     Genitourinary: Vagina normal and uterus normal. There is no tenderness or lesion on the right labia. There is no tenderness or lesion on the left labia. Cervix is normal. Right adnexum displays no mass, no tenderness and no fullness. Left adnexum displays no mass, no tenderness and no fullness. No vaginal discharge found. Additional cervical findings: pap smear done          Musculoskeletal: Moves all extremeties.       Neurological: She is alert and oriented to person, place, and time.     Psychiatric: She has a normal mood and affect.        Assessment/ Plan:     Orders Placed This Encounter    drospirenone-ethinyl estradiol (ERIBERTO) " 3-0.02 mg per tablet       Chioma was seen today for well woman.    Diagnoses and all orders for this visit:    Encounter for gynecological examination    BRCA gene mutation negative in female    Herpes genitalis in women    Other orders  -     drospirenone-ethinyl estradiol (ERIBERTO) 3-0.02 mg per tablet; Take 1 tablet by mouth once daily.    - discussed how tight sports bras can increase breast tenderness when they're removed. If not doing high impact cardio, may want to avoid wearing bras that are too tight. She'll keep me posted.  - sent in Rx for Eriberto. Will take at same time daily.  - will let me know when needs Valtrex refill.      Follow up in about 1 year (around 12/12/2020) for annual exam.

## 2022-01-18 NOTE — PATIENT INSTRUCTIONS
Upper Respiratory Infection (Cold): Care Instructions  Your Care Instructions     An upper respiratory infection, or URI, is an infection of the nose, sinuses, or throat. URIs are spread by coughs, sneezes, and direct contact. The common cold is the most frequent kind of URI. The flu and sinus infections are other kinds of URIs. Almost all URIs are caused by viruses. Antibiotics won't cure them. But you can treat most infections with home care. This may include drinking lots of fluids and taking over-the-counter pain medicine. You will probably feel better in 4 to 10 days. The doctor has checked you carefully, but problems can develop later. If you notice any problems or new symptoms, get medical treatment right away. Follow-up care is a key part of your treatment and safety. Be sure to make and go to all appointments, and call your doctor if you are having problems. It's also a good idea to know your test results and keep a list of the medicines you take. How can you care for yourself at home? · To prevent dehydration, drink plenty of fluids. Choose water and other clear liquids until you feel better. If you have kidney, heart, or liver disease and have to limit fluids, talk with your doctor before you increase the amount of fluids you drink. · Take an over-the-counter pain medicine, such as acetaminophen (Tylenol), ibuprofen (Advil, Motrin), or naproxen (Aleve). Read and follow all instructions on the label. · Before you use cough and cold medicines, check the label. These medicines may not be safe for young children or for people with certain health problems. · Be careful when taking over-the-counter cold or flu medicines and Tylenol at the same time. Many of these medicines have acetaminophen, which is Tylenol. Read the labels to make sure that you are not taking more than the recommended dose. Too much acetaminophen (Tylenol) can be harmful.   · Get plenty of rest.  · Do not smoke or allow others to smoke around you. If you need help quitting, talk to your doctor about stop-smoking programs and medicines. These can increase your chances of quitting for good. When should you call for help? Call 911 anytime you think you may need emergency care. For example, call if:    · You have severe trouble breathing. Call your doctor now or seek immediate medical care if:    · You seem to be getting much sicker.     · You have new or worse trouble breathing.     · You have a new or higher fever.     · You have a new rash. Watch closely for changes in your health, and be sure to contact your doctor if:    · You have a new symptom, such as a sore throat, an earache, or sinus pain.     · You cough more deeply or more often, especially if you notice more mucus or a change in the color of your mucus.     · You do not get better as expected. Where can you learn more? Go to http://www.gray.com/  Enter K520 in the search box to learn more about \"Upper Respiratory Infection (Cold): Care Instructions. \"  Current as of: July 6, 2021               Content Version: 13.0  © 2006-2021 [employer]. Care instructions adapted under license by [employer] (which disclaims liability or warranty for this information). If you have questions about a medical condition or this instruction, always ask your healthcare professional. [address] any warranty or liability for your use of this information.

## 2022-01-18 NOTE — TELEPHONE ENCOUNTER
Patient's wife calling, stating that her  Tea Fontenot tested pos for covid, she has concerns and would like a phone call back.  She may be reached at [phone]

## 2022-01-18 NOTE — PROGRESS NOTES
Ayden Padilla is a 77 y.o. male evaluated via telephone on 1/18/2022. Consent:    He and/or health care decision maker is aware that that he may receive a bill for this telephone service, depending on his insurance coverage, and has provided verbal consent to proceed: Yes    Documentation:  I communicated with the patient and/or health care decision maker about cough and congestion that started last night, woke up this morning with worsening cough, nasal drainage. Afebrile. + fatigue. + diarrhea, nausea but this has improved. Details of this discussion including any medical advice provided: Discussed likely viral cause of symptoms but I strongly urged him to get tested for COVID due to prevalence in our community and his co-morbidities. He is agreeable and has a home test. He declines a need for prescriptions. Cardiac stress test has been rescheduled. I affirm this is a Patient Initiated Episode with an Established Patient who has not had a related appointment within my department in the past 7 days or scheduled within the next 24 hours. ASSESSMENT AND PLAN:       ICD-10-CM ICD-9-CM    1. Viral upper respiratory tract infection  J06.9 465.9          Patient aware of plan of care and verbalized understanding. Questions answered. RTC PRN.     Sunita Gomez NP    Total Time: minutes: 5-10 minutes    Note: not billable if this call serves to triage the patient into an appointment for the relevant concern      Sunita Gomez NP

## 2022-01-18 NOTE — TELEPHONE ENCOUNTER
Pt called with cough and chest congestion, states he is sore in the chest. No fevers.  Please advise, Call back # [phone]

## 2022-03-18 PROBLEM — I24.0 RCA OCCLUSION (HCC): Status: ACTIVE | Noted: 2018-05-02

## 2022-03-19 PROBLEM — E78.49 FAMILIAL HYPERLIPIDEMIA, HIGH LDL: Status: ACTIVE | Noted: 2017-05-02

## 2022-03-20 PROBLEM — I21.4 NON-ST ELEVATION (NSTEMI) MYOCARDIAL INFARCTION (HCC): Status: ACTIVE | Noted: 2017-05-02

## 2022-03-20 PROBLEM — E78.1 HYPERTRIGLYCERIDEMIA: Status: ACTIVE | Noted: 2018-06-11

## 2022-03-20 PROBLEM — Z98.890 S/P CARDIAC CATH: Status: ACTIVE | Noted: 2019-08-01

## 2022-03-20 PROBLEM — Z78.9 STATIN INTOLERANCE: Status: ACTIVE | Noted: 2019-09-12

## 2022-05-17 ENCOUNTER — OFFICE VISIT (OUTPATIENT)
Dept: FAMILY MEDICINE CLINIC | Age: 67
End: 2022-05-17
Payer: COMMERCIAL

## 2022-05-17 VITALS
DIASTOLIC BLOOD PRESSURE: 90 MMHG | BODY MASS INDEX: 31.92 KG/M2 | TEMPERATURE: 97.8 F | HEART RATE: 87 BPM | OXYGEN SATURATION: 99 % | RESPIRATION RATE: 20 BRPM | WEIGHT: 228 LBS | SYSTOLIC BLOOD PRESSURE: 144 MMHG | HEIGHT: 71 IN

## 2022-05-17 DIAGNOSIS — L08.9 SPLINTER OF LEFT FOOT WITH INFECTION, INITIAL ENCOUNTER: Primary | ICD-10-CM

## 2022-05-17 DIAGNOSIS — I72.4 POPLITEAL ANEURYSM (HCC): ICD-10-CM

## 2022-05-17 DIAGNOSIS — I25.118 CORONARY ARTERY DISEASE OF NATIVE ARTERY OF NATIVE HEART WITH STABLE ANGINA PECTORIS (HCC): ICD-10-CM

## 2022-05-17 DIAGNOSIS — S90.852A SPLINTER OF LEFT FOOT WITH INFECTION, INITIAL ENCOUNTER: Primary | ICD-10-CM

## 2022-05-17 PROCEDURE — 10120 INC&RMVL FB SUBQ TISS SMPL: CPT | Performed by: NURSE PRACTITIONER

## 2022-05-17 PROCEDURE — 99214 OFFICE O/P EST MOD 30 MIN: CPT | Performed by: NURSE PRACTITIONER

## 2022-05-17 RX ORDER — SULFAMETHOXAZOLE AND TRIMETHOPRIM 800; 160 MG/1; MG/1
1 TABLET ORAL 2 TIMES DAILY
Qty: 10 TABLET | Refills: 0 | Status: SHIPPED | OUTPATIENT
Start: 2022-05-17 | End: 2022-05-22

## 2022-05-17 RX ORDER — SACUBITRIL AND VALSARTAN 24; 26 MG/1; MG/1
1 TABLET, FILM COATED ORAL 2 TIMES DAILY
COMMUNITY
Start: 2022-04-07

## 2022-05-17 NOTE — PROGRESS NOTES
Chief Complaint   Patient presents with    Foot Pain     left and swelling since Friday       HPI:    Victor Hugo Kruse is a 77 y.o. male in for an acute visit. Cardiac disease: Had PCA with Dr. Jose J Bacon to the Lcx and his RCA  9/2019. Hx CAD with SOB, angina. Echo 7/21 EF45-50, APRIL, PAP 28 aov sclerosis without stenosis     Dyslipidemia: TG remain elevated on Vascepa, Repatha. Polyarthralgia:  Chronic; follows with rheumatology    Right popliteal arterial aneurysm:  Stable, under the care of Dr. Kathryn Medrano.  + claudication. New issues: He planned for shoulder surgery with Dr. Shy Mena but requires further cardiac work up with Dr. Roman Cantu. Sx delayed. In today for an acute visit with a CC of L foot pain and swelling since Friday. He reports a sharp, shooting pain on the bottom of his foot, possibly stepped on something when letting the dogs out. The swelling and pain have improved sl overnight. Allergies   Allergen Reactions    Tetanus-Diphtheria Toxoids-Td Hives     Other reaction(s): red spot at injection site    Pcn [Penicillins] Other (comments)     Therapy ineffective         Current Outpatient Medications   Medication Sig    Entresto 24-26 mg tablet Take 1 Tablet by mouth two (2) times a day.  trimethoprim-sulfamethoxazole (BACTRIM DS, SEPTRA DS) 160-800 mg per tablet Take 1 Tablet by mouth two (2) times a day for 5 days.  clotrimazole-betamethasone (LOTRISONE) topical cream APPLY  CREAM TOPICALLY TO AFFECTED AREA AS NEEDED    triamcinolone acetonide (KENALOG) 0.1 % topical cream APPLY  CREAM EXTERNALLY TO AFFECTED AREA TWICE DAILY FOR  10  TO  14  DAYS  USING  A  THIN  LAYER  ON  FACE    amLODIPine (NORVASC) 2.5 mg tablet Take 1 tablet by mouth once daily    clopidogreL (PLAVIX) 75 mg tab TAKE 1 TABLET BY MOUTH ONCE DAILY AS  INDICATED  FOR  BLOOD  CLOT  PREVENTION    evolocumab (Repatha SureClick) pen injection INJECT 1 PEN UNDER THE SKIN EVERY 14 DAYS.     nitroglycerin (NITROSTAT) 0.4 mg SL tablet 1 Tab by SubLINGual route every five (5) minutes as needed for Chest Pain.  icosapent ethyL (VASCEPA) 1 gram capsule Take 2 Caps by mouth two (2) times daily (with meals).  acetaminophen (Tylenol Extra Strength) 500 mg tablet Take 1,000 mg by mouth as needed for Pain.  cholecalciferol, vitamin D3, (VITAMIN D3) 2,000 unit tab Take 1 Tab by mouth daily.  naproxen sodium (ALEVE) 220 mg cap Take 1-2 Caps by mouth as needed.  esomeprazole (NEXIUM) 40 mg capsule Take 40 mg by mouth daily as needed.  aspirin delayed-release 81 mg tablet Take  by mouth daily.  multivitamin (ONE A DAY) tablet Take 2 Tablets by mouth daily.  fluticasone propionate (FLONASE) 50 mcg/actuation nasal spray 2 Sprays by Both Nostrils route daily. Indications: runny nose (Patient not taking: Reported on 5/17/2022)    diclofenac (VOLTAREN) 1 % gel Apply 2 g to affected area four (4) times daily. (Patient not taking: Reported on 5/17/2022)     No current facility-administered medications for this visit. Past Medical History:   Diagnosis Date    CAD (coronary artery disease)     GERD (gastroesophageal reflux disease)     Hypercholesterolemia     Hypertension     Ill-defined condition     Reynauds       Family History   Problem Relation Age of Onset    Heart Disease Father     Hypertension Father     Elevated Lipids Father     Hypertension Mother     Cancer Maternal Grandfather         Throat    Liver Disease Paternal Grandmother     Heart Disease Paternal Grandmother     Heart Disease Paternal Grandfather        ROS:  Denies fever, chills, cough, chest pain, SOB,  nausea, vomiting, diarrhea, dysuria. Denies rashes, + wounds, arthralgias, weakness, numbness, visual changes, depression. Denies wt loss, wt gain, hemoptysis, hematochezia or melena. Patient is not experiencing chest pain radiating to the jaw and/or down the arms.     Physical Examination:    BP (!) 144/90 (BP 1 Location: Right upper arm, BP Patient Position: Sitting, BP Cuff Size: Large adult)   Pulse 87   Temp 97.8 °F (36.6 °C) (Temporal)   Resp 20   Ht 5' 11\" (1.803 m)   Wt 228 lb (103.4 kg)   SpO2 99%   BMI 31.80 kg/m²     Wt Readings from Last 3 Encounters:   22 228 lb (103.4 kg)   21 230 lb (104.3 kg)   21 218 lb (98.9 kg)         Constitutional: WDWN Male in no acute distress  HENT:  NC/AT  EYES: EOMI, PERRL  Neck:  Supple, no JVD, mass or bruit. No thyromegaly. Respiratory:  Respirations even and unlabored without use of accessory muscles, CTA throughout without wheezes, rales, rubs or rhonchi. Symmetrical chest expansion. Cardiac: RRR, no murmur  Abdomen:  +BS, soft, nontender without palpable HSM   Musculoskeletal:  No cyanosis, clubbing or edema of extremities. Moves all extremities without difficulty. Neurologic:  Smooth, even gait without assistance, CN 2-12 grossly intact. Skin:       Lymphadenopathy: no cervical or supraclavicular nodes  Psych: Pleasant and appropriate. Judgment normal. Alert and oriented x 3. Time out performed immediately prior to procedure:    Chart reviewed for the following:    *  Patient identified by name and   *  Agreement on procedure being performed  *  Risks and Benefits explained to the patient. *  Procedure site verified and marked as needed  *  Patient positioned for comfort  *  Consent was signed and verified    Time:  10:50  Date of Procedure:  22  Procedure performed by Romero Ramírez NP  Assistant:  Wesley Martin MA  Patient tolerated procedure well  Post procedural pain scale:  0 - no hurt  Comments:  none    Procedure: Removal of foreign body, 8 mm splinter to plantar aspect of L foot    Consent: signed, on chart. Lesion and surrounding skin cleansed with idoine swabs x 3. Local anesthesia of 1 mL of 1% lidocaine with epi injected around foreign body. Single vertical incision made to the subcutaneous layer. Splinter removed with forceps. Wound irrigated with NS copiously. Bandage applied. EBL: 0. Complications: none. Disposition: Pt tolerated well. Reviewed wound care instructions. Rx antibiotics due to medical hx. Patient aware of plan of care and verbalized understanding. Questions answered. RTC PRN.     Mirian Fatima NP middle

## 2022-05-17 NOTE — PROGRESS NOTES
1. \"Have you been to the ER, urgent care clinic since your last visit? Hospitalized since your last visit? \" No    2. \"Have you seen or consulted any other health care providers outside of the [address] [place] [address] since your last visit? \"  Yes cardiologist Dr Ga Caba 1-2 months ago    3. For patients aged 39-70: Has the patient had a colonoscopy / FIT/ Cologuard? Yes - Care Gap present. Rooming MA/LPN to request most recent results      If the patient is female:    4. For patients aged 41-77: Has the patient had a mammogram within the past 2 years? NA - based on age or sex      11. For patients aged 21-65: Has the patient had a pap smear?  NA - based on age or sex

## 2022-10-27 NOTE — TELEPHONE ENCOUNTER
Patient notified orders placed. Per Dr. Roma Hickey:    Yes continue other meds thanks    Spoke to patient's wife. (name noted on permission to release information). Identifiers x 2. Informed that patient is to continue other medications. States patient has not administered most recent dose of repatha. Had administered previous sample injection without difficulty. Confirmed follow up appointment.       Future Appointments   Date Time Provider [place] Kingi   1/14/2019  3:00 PM Jason Good  [address] 2/28/2019  7:30 AM Stephane Gray NP [address] [facility]

## 2022-11-15 ENCOUNTER — VIRTUAL VISIT (OUTPATIENT)
Dept: FAMILY MEDICINE CLINIC | Age: 67
End: 2022-11-15
Payer: COMMERCIAL

## 2022-11-15 DIAGNOSIS — J10.1 INFLUENZA A: Primary | ICD-10-CM

## 2022-11-15 LAB
EXP DATE SOLUTION: NORMAL
EXP DATE SWAB: NORMAL
LOT NUMBER SOLUTION: NORMAL
LOT NUMBER SWAB: NORMAL
SARS-COV-2 RNA POC: NEGATIVE

## 2022-11-15 PROCEDURE — 87635 SARS-COV-2 COVID-19 AMP PRB: CPT

## 2022-11-15 PROCEDURE — 87502 INFLUENZA DNA AMP PROBE: CPT

## 2022-11-15 PROCEDURE — 99442 PR PHYS/QHP TELEPHONE EVALUATION 11-20 MIN: CPT

## 2022-11-15 RX ORDER — PROMETHAZINE HYDROCHLORIDE AND DEXTROMETHORPHAN HYDROBROMIDE 6.25; 15 MG/5ML; MG/5ML
5 SYRUP ORAL
Qty: 118 ML | Refills: 0 | Status: SHIPPED | OUTPATIENT
Start: 2022-11-15 | End: 2022-11-22

## 2022-11-15 RX ORDER — OSELTAMIVIR PHOSPHATE 75 MG/1
75 CAPSULE ORAL 2 TIMES DAILY
Qty: 10 CAPSULE | Refills: 0 | Status: SHIPPED | OUTPATIENT
Start: 2022-11-15 | End: 2022-11-20

## 2022-11-15 NOTE — PROGRESS NOTES
1. \"Have you been to the ER, urgent care clinic since your last visit? Hospitalized since your last visit? \" No    2. \"Have you seen or consulted any other health care providers outside of the [address] [place] [address] since your last visit? \" No     3. For patients aged 39-70: Has the patient had a colonoscopy / FIT/ Cologuard? Yes - no Care Gap present      If the patient is female:    4. For patients aged 41-77: Has the patient had a mammogram within the past 2 years? NA - based on age or sex      11. For patients aged 21-65: Has the patient had a pap smear?  NA - based on age or sex    Chief Complaint   Patient presents with    Cough     Fever, vomiting, body aches, chest congestions,head ache started Sunday covid tested last night was negative temp 100.5

## 2022-11-15 NOTE — PROGRESS NOTES
Jimbo Berry (: 1955) is a 79 y.o. male, established patient, here for evaluation of the following chief complaint(s):   Cough (Fever, vomiting, body aches, chest congestions,head ache started  covid tested last night was negative temp 100.5)       ASSESSMENT/PLAN:  Below is the assessment and plan developed based on review of pertinent history, labs, studies, and medications. 1. Influenza A  -     AMB POC COVID-19 COV  -     AMB POC INFLUENZA A  AND B REAL-TIME RT-PCR  -     oseltamivir (TAMIFLU) 75 mg capsule; Take 1 Capsule by mouth two (2) times a day for 5 days. , Normal, Disp-10 Capsule, R-0  -     promethazine-dextromethorphan (PROMETHAZINE-DM) 6.25-15 mg/5 mL syrup; Take 5 mL by mouth every four (4) hours as needed for Cough for up to 7 days. , Normal, Disp-118 mL, R-0      Return if symptoms worsen or fail to improve. SUBJECTIVE/OBJECTIVE:  Jimbo Berry endorses cough, fever Tmax 100.5, body aches, headache, fatigue, and single episode of vomiting onset 2 days ago; he has been using ibuprofen and APAP with some relief of pain. A home COVID test was negative. Review of Systems   Constitutional:  Positive for chills, fatigue and fever. HENT:  Positive for congestion. Respiratory:  Positive for cough. Negative for shortness of breath and wheezing. Cardiovascular:  Negative for chest pain and palpitations. Gastrointestinal:  Positive for nausea and vomiting. Negative for abdominal pain and diarrhea. Skin:  Negative for rash. Neurological:  Positive for headaches. Negative for dizziness. On this date 11/15/2022 I have spent 15 minutes reviewing previous notes, test results and face to face (virtual) with the patient discussing the diagnosis and importance of compliance with the treatment plan as well as documenting on the day of the visit. Jimbo Berry, was evaluated through a synchronous (real-time) audio only encounter.  The patient (or guardian if applicable) is aware that this is a billable service, which includes applicable co-pays. This Virtual Visit was conducted with patient's (and/or legal guardian's) consent. The visit was conducted pursuant to the emergency declaration under the [facility], [address] [place] [address] authority and the [employer] and [employer] General Act. Patient identification was verified, and a caregiver was present when appropriate. The patient was located at: Home: [address] [place] [phone]  The provider was located at: Facility ([facility] Department): [id number] [place] [phone]       An electronic signature was used to authenticate this note.   -- Millie Shafer NP

## 2022-11-16 LAB
FLUAV+FLUBV AG NOSE QL IA.RAPID: NEGATIVE
FLUAV+FLUBV AG NOSE QL IA.RAPID: POSITIVE
VALID INTERNAL CONTROL?: YES

## 2022-11-17 ENCOUNTER — TELEPHONE (OUTPATIENT)
Dept: FAMILY MEDICINE CLINIC | Age: 67
End: 2022-11-17

## 2022-11-17 NOTE — TELEPHONE ENCOUNTER
Sondra Catia had a VV with Julia 11-15. He is not getting any better and can't keep the tamaflu down. The cough syrup he was given doesn't seem to be working either. He is extremely sore from coughing and having difficulty breathing. Wants advise on what he can do or take.

## 2022-11-18 NOTE — TELEPHONE ENCOUNTER
Pt declines OV. States that he is feel better than he was, just can't keep the meds down, so he isn't taking them. Feels better if he just takes it easy. Will call if he needs to.
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
## 2023-07-02 ENCOUNTER — HOSPITAL ENCOUNTER (EMERGENCY)
Facility: HOSPITAL | Age: 68
Discharge: HOME | End: 2023-07-02
Attending: FAMILY MEDICINE | Admitting: FAMILY MEDICINE
Payer: MEDICARE

## 2023-07-02 VITALS
TEMPERATURE: 98.2 F | SYSTOLIC BLOOD PRESSURE: 158 MMHG | WEIGHT: 230 LBS | HEART RATE: 80 BPM | RESPIRATION RATE: 18 BRPM | OXYGEN SATURATION: 99 % | HEIGHT: 70 IN | BODY MASS INDEX: 32.93 KG/M2 | DIASTOLIC BLOOD PRESSURE: 79 MMHG

## 2023-07-02 DIAGNOSIS — T14.8XXA HEMATOMA: Primary | ICD-10-CM

## 2023-07-02 PROCEDURE — 99282 EMERGENCY DEPT VISIT SF MDM: CPT | Performed by: FAMILY MEDICINE

## 2023-07-02 ASSESSMENT — PAIN SCALES - GENERAL
PAINLEVEL_OUTOF10: 0
PAINLEVEL_OUTOF10: 4

## 2023-07-02 ASSESSMENT — PAIN - FUNCTIONAL ASSESSMENT: PAIN_FUNCTIONAL_ASSESSMENT: 0-10

## 2023-07-02 ASSESSMENT — LIFESTYLE VARIABLES
HOW MANY STANDARD DRINKS CONTAINING ALCOHOL DO YOU HAVE ON A TYPICAL DAY: PATIENT DOES NOT DRINK
HOW OFTEN DO YOU HAVE A DRINK CONTAINING ALCOHOL: NEVER

## 2023-07-03 NOTE — ED PROVIDER NOTES
\[facility]\"" EMERGENCY DEP  EMERGENCY DEPARTMENT ENCOUNTER       Pt Name: Michel Toth  MRN: [id number]  [address] 1955  Date of evaluation: 7/2/2023  Provider: Tamra Carmona MD   PCP: MARINA Jimenez NP  Note Started: 7:19 AM EDT 7/3/23     CHIEF COMPLAINT       Chief Complaint   Patient presents with    Leg Pain        HISTORY OF PRESENT ILLNESS: 1 or more elements      History From: Patient  HPI Limitations: None     Michel Toth is a 76 y.o. male who presents to the ED with a concern about an injury to his left calf. A week ago he hit his calf on the trailer hitch, and over the next several days he had a large bruise that improved. He is on Plavix so he was not surprised to see a larger amount of blood than usual. Today his wife became concerned because there is still a palpable lump in the calf, and she was worried that there was a blood clot. Patient's pain is improving, and he has had no chest pain or shortness of breath. Nursing Notes were all reviewed and agreed with or any disagreements were addressed in the HPI. REVIEW OF SYSTEMS      Review of Systems     Positives and Pertinent negatives as per HPI.     PAST HISTORY     Past Medical History:  Past Medical History:   Diagnosis Date    CAD (coronary artery disease)     GERD (gastroesophageal reflux disease)     Hypercholesterolemia     Hypertension     Ill-defined condition     Reynauds         Past Surgical History:  Past Surgical History:   Procedure Laterality Date    CARDIAC CATHETERIZATION  2012    X2, no stents    COLONOSCOPY  2012    est, neg       Family History:  Family History   Problem Relation Age of Onset    Heart Disease Paternal Grandmother     Liver Disease Paternal Grandmother     Cancer Maternal Grandfather         Throat    Hypertension Mother     Elevated Lipids Father     Hypertension Father     Heart Disease Father     Heart Disease Paternal Grandfather        Social History:  Social History     Tobacco Use

## 2023-08-07 RX ORDER — CLOTRIMAZOLE AND BETAMETHASONE DIPROPIONATE 10; .64 MG/G; MG/G
CREAM TOPICAL
Qty: 15 G | Refills: 0 | Status: SHIPPED | OUTPATIENT
Start: 2023-08-07

## 2023-12-27 RX ORDER — TRIAMCINOLONE ACETONIDE 1 MG/G
CREAM TOPICAL
Qty: 30 G | Refills: 0 | Status: SHIPPED | OUTPATIENT
Start: 2023-12-27

## 2023-12-27 RX ORDER — CLOTRIMAZOLE AND BETAMETHASONE DIPROPIONATE 10; .64 MG/G; MG/G
CREAM TOPICAL
Qty: 15 G | Refills: 0 | OUTPATIENT
Start: 2023-12-27

## 2023-12-27 NOTE — TELEPHONE ENCOUNTER
Patient requesting refill on     Requested Prescriptions     Pending Prescriptions Disp Refills    triamcinolone (KENALOG) 0.1 % cream [Pharmacy Med Name: Triamcinolone Acetonide 0.1 % External Cream] 30 g 0     Sig: APPLY CREAM EXTERNALLY TO AFFECTED AREA TWICE DAILY FOR 10 TO 14 DAYS USE A THIN LAYER ON FACE        Last OV 6/16/2023

## 2024-03-19 DIAGNOSIS — Z95.5 STENTED CORONARY ARTERY: Primary | ICD-10-CM

## 2024-04-01 RX ORDER — AMLODIPINE BESYLATE 2.5 MG/1
2.5 TABLET ORAL DAILY
Qty: 90 TABLET | Refills: 5 | Status: SHIPPED | OUTPATIENT
Start: 2024-04-01

## 2024-04-01 NOTE — TELEPHONE ENCOUNTER
Patient requesting refill on     Requested Prescriptions     Pending Prescriptions Disp Refills    amLODIPine (NORVASC) 2.5 MG tablet [Pharmacy Med Name: amLODIPine Besylate 2.5 MG Oral Tablet] 90 tablet 0     Sig: Take 1 tablet by mouth once daily        Last OV 6/16/2023

## 2024-04-15 ENCOUNTER — CLINICAL DOCUMENTATION (OUTPATIENT)
Age: 69
End: 2024-04-15

## 2024-04-24 ENCOUNTER — TELEPHONE (OUTPATIENT)
Age: 69
End: 2024-04-24

## 2024-04-24 ENCOUNTER — OFFICE VISIT (OUTPATIENT)
Age: 69
End: 2024-04-24
Payer: MEDICARE

## 2024-04-24 VITALS
SYSTOLIC BLOOD PRESSURE: 138 MMHG | HEART RATE: 73 BPM | HEIGHT: 70 IN | BODY MASS INDEX: 32.56 KG/M2 | RESPIRATION RATE: 18 BRPM | OXYGEN SATURATION: 98 % | TEMPERATURE: 97.6 F | DIASTOLIC BLOOD PRESSURE: 78 MMHG | WEIGHT: 227.4 LBS

## 2024-04-24 DIAGNOSIS — E78.49 FAMILIAL HYPERLIPIDEMIA, HIGH LDL: ICD-10-CM

## 2024-04-24 DIAGNOSIS — Z78.9 STATIN INTOLERANCE: ICD-10-CM

## 2024-04-24 DIAGNOSIS — I25.110 CORONARY ARTERY DISEASE INVOLVING NATIVE CORONARY ARTERY OF NATIVE HEART WITH UNSTABLE ANGINA PECTORIS (HCC): ICD-10-CM

## 2024-04-24 DIAGNOSIS — M10.9 ACUTE GOUT OF RIGHT ANKLE, UNSPECIFIED CAUSE: Primary | ICD-10-CM

## 2024-04-24 DIAGNOSIS — I10 PRIMARY HYPERTENSION: ICD-10-CM

## 2024-04-24 LAB
COMMENT:: NORMAL
SPECIMEN HOLD: NORMAL

## 2024-04-24 PROCEDURE — G8427 DOCREV CUR MEDS BY ELIG CLIN: HCPCS

## 2024-04-24 PROCEDURE — 3017F COLORECTAL CA SCREEN DOC REV: CPT

## 2024-04-24 PROCEDURE — 1036F TOBACCO NON-USER: CPT

## 2024-04-24 PROCEDURE — 1123F ACP DISCUSS/DSCN MKR DOCD: CPT

## 2024-04-24 PROCEDURE — 3075F SYST BP GE 130 - 139MM HG: CPT

## 2024-04-24 PROCEDURE — 36415 COLL VENOUS BLD VENIPUNCTURE: CPT

## 2024-04-24 PROCEDURE — 99213 OFFICE O/P EST LOW 20 MIN: CPT

## 2024-04-24 PROCEDURE — G8417 CALC BMI ABV UP PARAM F/U: HCPCS

## 2024-04-24 PROCEDURE — 3078F DIAST BP <80 MM HG: CPT

## 2024-04-24 RX ORDER — COLCHICINE 0.6 MG/1
0.6 TABLET ORAL DAILY
Qty: 30 TABLET | Refills: 0 | Status: SHIPPED | OUTPATIENT
Start: 2024-04-24

## 2024-04-24 SDOH — ECONOMIC STABILITY: INCOME INSECURITY: HOW HARD IS IT FOR YOU TO PAY FOR THE VERY BASICS LIKE FOOD, HOUSING, MEDICAL CARE, AND HEATING?: NOT HARD AT ALL

## 2024-04-24 SDOH — ECONOMIC STABILITY: HOUSING INSECURITY
IN THE LAST 12 MONTHS, WAS THERE A TIME WHEN YOU DID NOT HAVE A STEADY PLACE TO SLEEP OR SLEPT IN A SHELTER (INCLUDING NOW)?: NO

## 2024-04-24 SDOH — ECONOMIC STABILITY: FOOD INSECURITY: WITHIN THE PAST 12 MONTHS, THE FOOD YOU BOUGHT JUST DIDN'T LAST AND YOU DIDN'T HAVE MONEY TO GET MORE.: NEVER TRUE

## 2024-04-24 SDOH — ECONOMIC STABILITY: FOOD INSECURITY: WITHIN THE PAST 12 MONTHS, YOU WORRIED THAT YOUR FOOD WOULD RUN OUT BEFORE YOU GOT MONEY TO BUY MORE.: NEVER TRUE

## 2024-04-24 ASSESSMENT — PATIENT HEALTH QUESTIONNAIRE - PHQ9
2. FEELING DOWN, DEPRESSED OR HOPELESS: NOT AT ALL
SUM OF ALL RESPONSES TO PHQ QUESTIONS 1-9: 0
SUM OF ALL RESPONSES TO PHQ9 QUESTIONS 1 & 2: 0
SUM OF ALL RESPONSES TO PHQ QUESTIONS 1-9: 0
1. LITTLE INTEREST OR PLEASURE IN DOING THINGS: NOT AT ALL
SUM OF ALL RESPONSES TO PHQ QUESTIONS 1-9: 0
SUM OF ALL RESPONSES TO PHQ QUESTIONS 1-9: 0

## 2024-04-24 ASSESSMENT — ENCOUNTER SYMPTOMS
ALLERGIC/IMMUNOLOGIC NEGATIVE: 1
CONSTIPATION: 0
BLOOD IN STOOL: 0
WHEEZING: 0
DIARRHEA: 0
SHORTNESS OF BREATH: 0
EYES NEGATIVE: 1
RESPIRATORY NEGATIVE: 1
COUGH: 0

## 2024-04-24 NOTE — PROGRESS NOTES
Labs drawn in left arm per Ester's orders,  tolerated well, 3 SST, 1 plain red top for pull off serum, 2 lavenders - 1 for pull off for plasma.        
\"Have you been to the ER, urgent care clinic since your last visit?  Hospitalized since your last visit?\"    NO    “Have you seen or consulted any other health care providers outside of [facility] since your last visit?”    Dr Ponce Cardio Rickie La       “Have you had a colorectal cancer screening such as a colonoscopy/FIT/Cologuard?    NO\    Chief Complaint   Patient presents with    Ankle Pain     With swelling x 1 wk        Vitals:    04/24/24 0914   BP: 138/78   Pulse: 73   Resp: 18   Temp: 97.6 °F (36.4 °C)   SpO2: 98%     
for dysphoric mood and sleep disturbance. The patient is not nervous/anxious.           Vitals:    04/24/24 0914   BP: 138/78   Site: Left Upper Arm   Position: Sitting   Cuff Size: Large Adult   Pulse: 73   Resp: 18   Temp: 97.6 °F (36.4 °C)   TempSrc: Infrared   SpO2: 98%   Weight: 103.1 kg (227 lb 6.4 oz)   Height: 1.778 m (5' 10\")        Wt Readings from Last 3 Encounters:   04/24/24 103.1 kg (227 lb 6.4 oz)   07/02/23 104.3 kg (230 lb)   06/16/23 105.1 kg (231 lb 12.8 oz)           4/24/2024     9:14 AM   PHQ-9    Little interest or pleasure in doing things 0   Feeling down, depressed, or hopeless 0   PHQ-2 Score 0   PHQ-9 Total Score 0        Physical Exam  Constitutional:       Appearance: Normal appearance.   HENT:      Head: Normocephalic and atraumatic.   Eyes:      Extraocular Movements: Extraocular movements intact.      Pupils: Pupils are equal, round, and reactive to light.   Cardiovascular:      Rate and Rhythm: Normal rate.   Pulmonary:      Effort: Pulmonary effort is normal.   Musculoskeletal:      Comments: Right foot and ankle nonpitting edema with erythema, warmth, and tenderness over lateral malleolus; there is some dependent ecchymosis in the toes and heal  DP/PT pulses 2+   Skin:     General: Skin is warm and dry.   Neurological:      General: No focal deficit present.      Mental Status: He is alert and oriented to person, place, and time.   Psychiatric:         Mood and Affect: Mood normal.         Behavior: Behavior normal.         Thought Content: Thought content normal.         Judgment: Judgment normal.          ASSESSMENT AND PLAN:     1. Acute gout of right ankle, unspecified cause  -     colchicine (COLCRYS) 0.6 MG tablet; Take 1 tablet by mouth daily, Disp-30 tablet, R-0Normal  2. Coronary artery disease involving native coronary artery of native heart with unstable angina pectoris (HCC)  -     Lipoprotein NMR; Future  -     Comprehensive Metabolic Panel; Future  -     Hemoglobin

## 2024-04-24 NOTE — TELEPHONE ENCOUNTER
CHAZ Hunter to confirm the proper send out handling for the NMRL,( [id number]) seemed a little confusing.  Per Elsa, definitely not a frozen specimen, but is kept in the refrigerator, ice pack placed in the box while waiting for carrier to .  Instructions shared with Nurse SADIA Galarza.

## 2024-04-25 LAB
ALBUMIN SERPL-MCNC: 3.8 G/DL (ref 3.5–5)
ALBUMIN/GLOB SERPL: 1.2 (ref 1.1–2.2)
ALP SERPL-CCNC: 87 U/L (ref 45–117)
ALT SERPL-CCNC: 25 U/L (ref 12–78)
ANION GAP SERPL CALC-SCNC: 6 MMOL/L (ref 5–15)
AST SERPL-CCNC: 17 U/L (ref 15–37)
BILIRUB SERPL-MCNC: 0.4 MG/DL (ref 0.2–1)
BUN SERPL-MCNC: 16 MG/DL (ref 6–20)
BUN/CREAT SERPL: 16 (ref 12–20)
CALCIUM SERPL-MCNC: 9.3 MG/DL (ref 8.5–10.1)
CHLORIDE SERPL-SCNC: 111 MMOL/L (ref 97–108)
CO2 SERPL-SCNC: 25 MMOL/L (ref 21–32)
CREAT SERPL-MCNC: 1.03 MG/DL (ref 0.7–1.3)
EST. AVERAGE GLUCOSE BLD GHB EST-MCNC: 108 MG/DL
GLOBULIN SER CALC-MCNC: 3.3 G/DL (ref 2–4)
GLUCOSE SERPL-MCNC: 109 MG/DL (ref 65–100)
HBA1C MFR BLD: 5.4 % (ref 4–5.6)
NT PRO BNP: 130 PG/ML
POTASSIUM SERPL-SCNC: 4.1 MMOL/L (ref 3.5–5.1)
PROT SERPL-MCNC: 7.1 G/DL (ref 6.4–8.2)
SODIUM SERPL-SCNC: 142 MMOL/L (ref 136–145)

## 2024-04-26 LAB
APO B SERPL-MCNC: 44 MG/DL
CHOLEST SERPL-MCNC: 124 MG/DL (ref 100–199)
HDL SERPL-SCNC: 30.8 UMOL/L
HDLC SERPL-MCNC: 36 MG/DL
LDL SERPL QN: ABNORMAL NM
LDL SERPL-SCNC: 412 NMOL/L
LDL SMALL SERPL-SCNC: 136 NMOL/L
LDLC SERPL CALC-MCNC: 50 MG/DL (ref 0–99)
LP-IR SCORE SERPL: 75
LPA SERPL-SCNC: 14.8 NMOL/L
TRIGL SERPL-MCNC: 237 MG/DL (ref 0–149)

## 2024-05-06 ENCOUNTER — HOSPITAL ENCOUNTER (OUTPATIENT)
Facility: HOSPITAL | Age: 69
Setting detail: RECURRING SERIES
Discharge: HOME | End: 2024-05-09
Payer: MEDICARE

## 2024-05-06 VITALS — WEIGHT: 227 LBS | OXYGEN SATURATION: 67 % | HEIGHT: 70 IN | BODY MASS INDEX: 32.5 KG/M2

## 2024-05-06 PROCEDURE — 93797 PHYS/QHP OP CAR RHAB WO ECG: CPT

## 2024-05-06 PROCEDURE — 93798 PHYS/QHP OP CAR RHAB W/ECG: CPT

## 2024-05-06 ASSESSMENT — PATIENT HEALTH QUESTIONNAIRE - PHQ9
3. TROUBLE FALLING OR STAYING ASLEEP: NEARLY EVERY DAY
SUM OF ALL RESPONSES TO PHQ QUESTIONS 1-9: 8
SUM OF ALL RESPONSES TO PHQ QUESTIONS 1-9: 8
SUM OF ALL RESPONSES TO PHQ9 QUESTIONS 1 & 2: 0
8. MOVING OR SPEAKING SO SLOWLY THAT OTHER PEOPLE COULD HAVE NOTICED. OR THE OPPOSITE, BEING SO FIGETY OR RESTLESS THAT YOU HAVE BEEN MOVING AROUND A LOT MORE THAN USUAL: NOT AT ALL
SUM OF ALL RESPONSES TO PHQ QUESTIONS 1-9: 8
4. FEELING TIRED OR HAVING LITTLE ENERGY: NEARLY EVERY DAY
SUM OF ALL RESPONSES TO PHQ QUESTIONS 1-9: 8
5. POOR APPETITE OR OVEREATING: SEVERAL DAYS
7. TROUBLE CONCENTRATING ON THINGS, SUCH AS READING THE NEWSPAPER OR WATCHING TELEVISION: NOT AT ALL
1. LITTLE INTEREST OR PLEASURE IN DOING THINGS: NOT AT ALL
9. THOUGHTS THAT YOU WOULD BE BETTER OFF DEAD, OR OF HURTING YOURSELF: NOT AT ALL
2. FEELING DOWN, DEPRESSED OR HOPELESS: NOT AT ALL
6. FEELING BAD ABOUT YOURSELF - OR THAT YOU ARE A FAILURE OR HAVE LET YOURSELF OR YOUR FAMILY DOWN: SEVERAL DAYS

## 2024-05-06 ASSESSMENT — LIFESTYLE VARIABLES
ALCOHOL_TYPE: BEER
ALCOHOL_AMOUNT: 1
ALCOHOL_USE: SPECIAL

## 2024-05-06 ASSESSMENT — EXERCISE STRESS TEST
PEAK_HR: 97
PEAK_BP: 160/80
PEAK_RPE: 10
PEAK_RPE: 10
PEAK_RPD: 0
PEAK_METS: 2.6
PEAK_HR: 62
PEAK_BP: 160/80
PEAK_BP: 160/80

## 2024-05-06 ASSESSMENT — EJECTION FRACTION
EF_VALUE: 60
EF_VALUE: 60

## 2024-05-06 NOTE — CARDIO/PULMONARY
INTAKE APPOINTMENT NOTE  2024    NAME: David Quevedo : 1955 AGE: 68 y.o.  GENDER: male    CARDIAC REHAB ADMITTING DIAGNOSIS: Stented coronary artery [Z95.5]    REFERRING PHYSICIAN: Nasra Ponce, *    MEDICAL HX:  Past Medical History:   Diagnosis Date    CAD (coronary artery disease)     GERD (gastroesophageal reflux disease)     Hypercholesterolemia     Hypertension     Ill-defined condition     Reynauds       LABS:     No results found for: \"HBA1C\", \"[id number]\"  Lab Results   Component Value Date/Time    CHOL 124 2024 09:42 AM    HDL 36 2024 09:42 AM    LDL 50 2024 09:42 AM    VLDL 75.8 2023 03:41 PM         ANTHROPOMETRICS:      Ht Readings from Last 1 Encounters:   24 1.778 m (5' 10\")      Wt Readings from Last 1 Encounters:   24 103.1 kg (227 lb 6.4 oz)        WAIST: 42       VISIT SUMMARY:    David Quevedo 68 y.o. presented to Cardiac Rehab for program orientation and 6 minute walk test today with a primary diagnosis of Stented coronary artery [Z95.5]. EF is 60 % Cardiac risk factors include family history, dyslipidemia, obesity, hypertension, stress, prior MI.   Patient is a current/recent smoker.  David Quevedo is  and lives with his wife. Patient was evaluated for depression using the PHQ-9 assessment tool with a result of 8 which is considered mild. The result was discussed with patient.  Patient denied chest pain or SOB during 6 minute walk test and was in  SR/ST/freq PVC's. Patient walked 329  meters around the track  for a final MET level of  2.6.   Exercise prescription developed using exercise tolerance results and patient stated goals, to be supplemented with home exercise recommendations. Education manual given to patient and reviewed. Patient will attend cardiac rehab 2-3 times/week which will include both exercise and education sessions.    Patient states that he/she would like to accomplish the following by

## 2024-05-13 ENCOUNTER — HOSPITAL ENCOUNTER (OUTPATIENT)
Facility: HOSPITAL | Age: 69
Setting detail: RECURRING SERIES
Discharge: HOME | End: 2024-05-16
Payer: MEDICARE

## 2024-05-13 VITALS — BODY MASS INDEX: 32.97 KG/M2 | WEIGHT: 229.8 LBS

## 2024-05-13 PROCEDURE — 93798 PHYS/QHP OP CAR RHAB W/ECG: CPT

## 2024-05-13 ASSESSMENT — EXERCISE STRESS TEST
PEAK_RPE: 10
PEAK_HR: 90
PEAK_METS: 2

## 2024-05-20 ENCOUNTER — HOSPITAL ENCOUNTER (OUTPATIENT)
Facility: HOSPITAL | Age: 69
Setting detail: RECURRING SERIES
Discharge: HOME | End: 2024-05-23
Payer: MEDICARE

## 2024-05-20 VITALS — WEIGHT: 226 LBS | BODY MASS INDEX: 32.43 KG/M2

## 2024-05-20 PROCEDURE — 93798 PHYS/QHP OP CAR RHAB W/ECG: CPT

## 2024-05-20 ASSESSMENT — EXERCISE STRESS TEST
PEAK_HR: 86
PEAK_METS: 2
PEAK_RPE: 8

## 2024-08-02 ENCOUNTER — TELEPHONE (OUTPATIENT)
Facility: HOSPITAL | Age: 69
End: 2024-08-02

## 2024-08-15 ENCOUNTER — TELEPHONE (OUTPATIENT)
Facility: HOSPITAL | Age: 69
End: 2024-08-15

## 2024-08-15 NOTE — TELEPHONE ENCOUNTER
Cardiac Rehab chart broken down 8/15/24.   Add High Risk Medication Management Associated Diagnosis?: Yes Detail Level: Generalized Initial Quantiferon Gold (Optional): 04/2021

## 2024-09-27 PROBLEM — E78.2 MIXED DYSLIPIDEMIA: Status: ACTIVE | Noted: 2017-06-13

## 2024-09-27 PROBLEM — I21.4 NON-ST ELEVATION (NSTEMI) MYOCARDIAL INFARCTION (HCC): Status: ACTIVE | Noted: 2017-05-02

## 2024-09-27 PROBLEM — I50.22 CHRONIC HFREF (HEART FAILURE WITH REDUCED EJECTION FRACTION) (HCC): Chronic | Status: ACTIVE | Noted: 2022-07-21

## 2024-09-27 PROBLEM — K21.9 GERD (GASTROESOPHAGEAL REFLUX DISEASE): Status: ACTIVE | Noted: 2017-06-12

## 2024-09-27 PROBLEM — I20.9 ANGINA PECTORIS (HCC): Status: ACTIVE | Noted: 2017-05-02

## 2024-10-03 PROBLEM — M17.0 BILATERAL PRIMARY OSTEOARTHRITIS OF KNEE: Status: ACTIVE | Noted: 2024-10-03

## 2024-10-16 ENCOUNTER — HOSPITAL ENCOUNTER (OUTPATIENT)
Facility: HOSPITAL | Age: 69
Discharge: HOME | End: 2024-10-19
Payer: MEDICARE

## 2024-10-16 VITALS
DIASTOLIC BLOOD PRESSURE: 79 MMHG | HEIGHT: 69 IN | BODY MASS INDEX: 32.23 KG/M2 | RESPIRATION RATE: 18 BRPM | WEIGHT: 217.6 LBS | TEMPERATURE: 97.5 F | SYSTOLIC BLOOD PRESSURE: 133 MMHG | HEART RATE: 57 BPM

## 2024-10-16 LAB
ABO + RH BLD: NORMAL
ANION GAP SERPL CALC-SCNC: 4 MMOL/L (ref 2–12)
APPEARANCE UR: CLEAR
BACTERIA URNS QL MICRO: NEGATIVE /HPF
BILIRUB UR QL: NEGATIVE
BLOOD GROUP ANTIBODIES SERPL: NORMAL
BUN SERPL-MCNC: 15 MG/DL (ref 6–20)
BUN/CREAT SERPL: 15 (ref 12–20)
CALCIUM SERPL-MCNC: 8.9 MG/DL (ref 8.5–10.1)
CHLORIDE SERPL-SCNC: 111 MMOL/L (ref 97–108)
CO2 SERPL-SCNC: 26 MMOL/L (ref 21–32)
COLOR UR: NORMAL
CREAT SERPL-MCNC: 1 MG/DL (ref 0.7–1.3)
EPITH CASTS URNS QL MICRO: NORMAL /LPF
ERYTHROCYTE [DISTWIDTH] IN BLOOD BY AUTOMATED COUNT: 13.6 % (ref 11.5–14.5)
EST. AVERAGE GLUCOSE BLD GHB EST-MCNC: 103 MG/DL
GLUCOSE SERPL-MCNC: 94 MG/DL (ref 65–100)
GLUCOSE UR STRIP.AUTO-MCNC: NEGATIVE MG/DL
HBA1C MFR BLD: 5.2 % (ref 4–5.6)
HCT VFR BLD AUTO: 40.4 % (ref 36.6–50.3)
HGB BLD-MCNC: 13.5 G/DL (ref 12.1–17)
HGB UR QL STRIP: NEGATIVE
HYALINE CASTS URNS QL MICRO: NORMAL /LPF (ref 0–5)
INR PPP: 1 (ref 0.9–1.1)
KETONES UR QL STRIP.AUTO: NEGATIVE MG/DL
LEUKOCYTE ESTERASE UR QL STRIP.AUTO: NEGATIVE
MCH RBC QN AUTO: 31 PG (ref 26–34)
MCHC RBC AUTO-ENTMCNC: 33.4 G/DL (ref 30–36.5)
MCV RBC AUTO: 92.9 FL (ref 80–99)
NITRITE UR QL STRIP.AUTO: NEGATIVE
NRBC # BLD: 0 K/UL (ref 0–0.01)
NRBC BLD-RTO: 0 PER 100 WBC
PH UR STRIP: 8 (ref 5–8)
PLATELET # BLD AUTO: 242 K/UL (ref 150–400)
PMV BLD AUTO: 10.1 FL (ref 8.9–12.9)
POTASSIUM SERPL-SCNC: 3.9 MMOL/L (ref 3.5–5.1)
PROT UR STRIP-MCNC: NEGATIVE MG/DL
PROTHROMBIN TIME: 10.3 SEC (ref 9–11.1)
RBC # BLD AUTO: 4.35 M/UL (ref 4.1–5.7)
RBC #/AREA URNS HPF: NORMAL /HPF (ref 0–5)
SODIUM SERPL-SCNC: 141 MMOL/L (ref 136–145)
SP GR UR REFRACTOMETRY: 1.02 (ref 1–1.03)
SPECIMEN EXP DATE BLD: NORMAL
URINE CULTURE IF INDICATED: NORMAL
UROBILINOGEN UR QL STRIP.AUTO: 0.2 EU/DL (ref 0.2–1)
WBC # BLD AUTO: 6.4 K/UL (ref 4.1–11.1)
WBC URNS QL MICRO: NORMAL /HPF (ref 0–4)

## 2024-10-16 PROCEDURE — 81001 URINALYSIS AUTO W/SCOPE: CPT

## 2024-10-16 PROCEDURE — 85027 COMPLETE CBC AUTOMATED: CPT

## 2024-10-16 PROCEDURE — 85610 PROTHROMBIN TIME: CPT

## 2024-10-16 PROCEDURE — 36415 COLL VENOUS BLD VENIPUNCTURE: CPT

## 2024-10-16 PROCEDURE — 83036 HEMOGLOBIN GLYCOSYLATED A1C: CPT

## 2024-10-16 PROCEDURE — 86901 BLOOD TYPING SEROLOGIC RH(D): CPT

## 2024-10-16 PROCEDURE — 86850 RBC ANTIBODY SCREEN: CPT

## 2024-10-16 PROCEDURE — 80048 BASIC METABOLIC PNL TOTAL CA: CPT

## 2024-10-16 PROCEDURE — 86900 BLOOD TYPING SEROLOGIC ABO: CPT

## 2024-10-16 ASSESSMENT — PROMIS GLOBAL HEALTH SCALE
HOW IS THE PROMIS V1.1 BEING ADMINISTERED?: PAPER
SUM OF RESPONSES TO QUESTIONS 2, 4, 5, & 10: 17
IN GENERAL, PLEASE RATE HOW WELL YOU CARRY OUT YOUR USUAL SOCIAL ACTIVITIES (INCLUDES ACTIVITIES AT HOME, AT WORK, AND IN YOUR COMMUNITY, AND RESPONSIBILITIES AS A PARENT, CHILD, SPOUSE, EMPLOYEE, FRIEND, ETC) [ON A SCALE OF 1 (POOR) TO 5 (EXCELLENT)]?: GOOD
IN GENERAL, HOW WOULD YOU RATE YOUR SATISFACTION WITH YOUR SOCIAL ACTIVITIES AND RELATIONSHIPS [ON A SCALE OF 1 (POOR) TO 5 (EXCELLENT)]?: VERY GOOD
IN THE PAST 7 DAYS, HOW WOULD YOU RATE YOUR FATIGUE ON AVERAGE [ON A SCALE FROM 1 (NONE) TO 5 (VERY SEVERE)]?: MODERATE
IN GENERAL, HOW WOULD YOU RATE YOUR MENTAL HEALTH, INCLUDING YOUR MOOD AND YOUR ABILITY TO THINK [ON A SCALE OF 1 (POOR) TO 5 (EXCELLENT)]?: EXCELLENT
IN THE PAST 7 DAYS, HOW OFTEN HAVE YOU BEEN BOTHERED BY EMOTIONAL PROBLEMS, SUCH AS FEELING ANXIOUS, DEPRESSED, OR IRRITABLE [ON A SCALE FROM 1 (NEVER) TO 5 (ALWAYS)]?: NEVER
IN GENERAL, HOW WOULD YOU RATE YOUR PHYSICAL HEALTH [ON A SCALE OF 1 (POOR) TO 5 (EXCELLENT)]?: GOOD
TO WHAT EXTENT ARE YOU ABLE TO CARRY OUT YOUR EVERYDAY PHYSICAL ACTIVITIES SUCH AS WALKING, CLIMBING STAIRS, CARRYING GROCERIES, OR MOVING A CHAIR [ON A SCALE OF 1 (NOT AT ALL) TO 5 (COMPLETELY)]?: MODERATELY
IN GENERAL, WOULD YOU SAY YOUR HEALTH IS...[ON A SCALE OF 1 (POOR) TO 5 (EXCELLENT)]: GOOD
IN GENERAL, WOULD YOU SAY YOUR QUALITY OF LIFE IS...[ON A SCALE OF 1 (POOR) TO 5 (EXCELLENT)]: GOOD
SUM OF RESPONSES TO QUESTIONS 3, 6, 7, & 8: 16
WHO IS THE PERSON COMPLETING THE PROMIS V1.1 SURVEY?: SELF
IN THE PAST 7 DAYS, HOW WOULD YOU RATE YOUR PAIN ON AVERAGE [ON A SCALE FROM 0 (NO PAIN) TO 10 (WORST IMAGINABLE PAIN)]?: 7

## 2024-10-16 ASSESSMENT — KOOS JR
STANDING UPRIGHT: SEVERE
HOW SEVERE IS YOUR KNEE STIFFNESS AFTER FIRST WAKING IN MORNING: SEVERE
KOOS JR TOTAL INTERVAL SCORE: 42.281
BENDING TO THE FLOOR TO PICK UP OBJECT: MODERATE
STRAIGHTENING KNEE FULLY: MODERATE
GOING UP OR DOWN STAIRS: SEVERE
TWISING OR PIVOTING ON KNEE: SEVERE
RISING FROM SITTING: MODERATE

## 2024-10-16 ASSESSMENT — PAIN DESCRIPTION - FREQUENCY: FREQUENCY: INTERMITTENT

## 2024-10-16 ASSESSMENT — PAIN SCALES - GENERAL: PAINLEVEL_OUTOF10: 4

## 2024-10-16 ASSESSMENT — PAIN DESCRIPTION - LOCATION: LOCATION: KNEE

## 2024-10-16 ASSESSMENT — PAIN DESCRIPTION - ORIENTATION: ORIENTATION: RIGHT

## 2024-10-16 ASSESSMENT — PAIN DESCRIPTION - PAIN TYPE: TYPE: CHRONIC PAIN

## 2024-10-16 ASSESSMENT — PAIN - FUNCTIONAL ASSESSMENT: PAIN_FUNCTIONAL_ASSESSMENT: PREVENTS OR INTERFERES WITH ALL ACTIVE AND SOME PASSIVE ACTIVITIES

## 2024-10-16 ASSESSMENT — PAIN DESCRIPTION - DESCRIPTORS: DESCRIPTORS: SORE;BURNING;STABBING

## 2024-10-16 NOTE — PERIOP NOTE
[address] [address]   MAIN OR                     [phone]    MAIN PRE OP             [phone]                                                                                AMBULATORY PRE OP          [phone]  PRE-ADMISSION TESTING    [phone]     Surgery Date:  10/30/24       Is surgery arrival time given by surgeon?    NO    If “NO”, [facility] staff will call you between 4 and 7pm the day before your surgery with your arrival time. (If your surgery is on a Monday, we will call you the Friday before.)    Call [phone] after 7pm Monday-Friday if you did not receive this call.    INSTRUCTIONS BEFORE YOUR SURGERY   When You  Arrive Arrive at [facility] Patient Access on 1st floor the day of your surgery.  Have your insurance card, photo ID,living will/advanced directive/POA (if applicable),  and any copayment (if needed)   Food   and   Drink NO solid food after midnight the night before surgery. You can drink clear liquids from midnight until ONE hour prior to your arrival at the hospital on the day of your surgery. Clear liquids include:  Water  Fruit juices without pulp  Carbonated beverages  Black coffee(no cream/milk)  Tea(no cream/milk)  Gatorade    No alcohol (beer, wine, liquor) or marijuana (smoking) 24 hours, edibles (3 days). Stop smoking cigarettes 14 days before surgery (helps w/healing and breathing).   Medications to   TAKE   Morning of Surgery MEDICATIONS TO TAKE THE MORNING OF SURGERY WITH A SIP OF WATER:   NONE  You may take these medications, IF NEEDED, the morning of surgery: NITROGLYCERINE, ESOMEPRAZOLE.  Ask your surgeon/prescribing doctor for instructions on taking or stopping these medications prior to surgery: PROSURGREL ( PAT NP WILL CALL WITH INSTRUCTIONS.   Medications to STOP  before surgery Non-Steroidal anti-inflammatory Drugs (NSAID's): for example, Ibuprofen (Advil, Motrin), Naproxen (Aleve) 3 
PROMIS AND KOOS COMPLETED, ENTERED INTO EPIC AND PLACED IN FOLDER AT .     Ortho Discharge Planning form placed in folder at .     PT GIVEN INFORMATION FOR ONLINE JOINT CLASS.      LORENA ALEXIS NP STATED SHE WILL  ALL PATIENT WITH EFFIENT INSTRUCTIONS.    
(NOTE)  HbA1C Interpretive Ranges  <5.7              Normal  5.7 - 6.4         Consider Prediabetes  >6.5              Consider Diabetes      Estimated Avg Glucose 10/16/2024 103  mg/dL Final    INR 10/16/2024 1.0  0.9 - 1.1   Final    A single therapeutic range for Vit K antagonists may not be optimal for all indications - see June, 2008 issue of Chest, American College of Chest [employer]icians Evidence-Based Clinical Practice Guidelines, 8th Edition.    Protime 10/16/2024 10.3  9.0 - 11.1 sec Final    Crossmatch expiration date 10/16/2024 10/30/2024,2359   Final    ABO/Rh 10/16/2024 A POSITIVE   Final    Antibody Screen 10/16/2024 NEG   Final    Color, UA 10/16/2024 YELLOW/STRAW    Final    Color Reference Range: Straw, Yellow or Dark Yellow    Appearance 10/16/2024 CLEAR  CLEAR   Final    Specific [place], UA 10/16/2024 1.016  1.003 - 1.030   Final    pH, Urine 10/16/2024 8.0  5.0 - 8.0   Final    Protein, UA 10/16/2024 Negative  NEG mg/dL Final    Glucose, Ur 10/16/2024 Negative  NEG mg/dL Final    Ketones, Urine 10/16/2024 Negative  NEG mg/dL Final    Bilirubin, Urine 10/16/2024 Negative  NEG   Final    Blood, Urine 10/16/2024 Negative  NEG   Final    Urobilinogen, Urine 10/16/2024 0.2  0.2 - 1.0 EU/dL Final    Nitrite, Urine 10/16/2024 Negative  NEG   Final    Leukocyte Esterase, Urine 10/16/2024 Negative  NEG   Final    WBC, UA 10/16/2024 0-4  0 - 4 /hpf Final    RBC, UA 10/16/2024 0-5  0 - 5 /hpf Final    Epithelial Cells, UA 10/16/2024 FEW  FEW /lpf Final    Epithelial cell category consists of squamous cells and /or transitional urothelial cells. Renal tubular cells, if present, are separately identified as such.    BACTERIA, URINE 10/16/2024 Negative  NEG /hpf Final    Urine Culture if Indicated 10/16/2024 CULTURE NOT INDICATED BY UA RESULT  CNI   Final    Hyaline Casts, UA 10/16/2024 0-2  0 - 5 /lpf Final                MARINA CONNER - NP  Available via [employer]

## 2024-10-17 LAB
BACTERIA SPEC CULT: NORMAL
BACTERIA SPEC CULT: NORMAL
SERVICE CMNT-IMP: NORMAL

## 2024-12-16 ENCOUNTER — OFFICE VISIT (OUTPATIENT)
Age: 69
End: 2024-12-16
Payer: MEDICARE

## 2024-12-16 VITALS
HEART RATE: 66 BPM | OXYGEN SATURATION: 99 % | TEMPERATURE: 97.7 F | DIASTOLIC BLOOD PRESSURE: 72 MMHG | HEIGHT: 70 IN | SYSTOLIC BLOOD PRESSURE: 106 MMHG | WEIGHT: 219 LBS | BODY MASS INDEX: 31.35 KG/M2 | RESPIRATION RATE: 19 BRPM

## 2024-12-16 DIAGNOSIS — J40 BRONCHITIS: Primary | ICD-10-CM

## 2024-12-16 LAB
INFLUENZA A ANTIGEN, POC: NEGATIVE
INFLUENZA B ANTIGEN, POC: NEGATIVE
VALID INTERNAL CONTROL, POC: NORMAL

## 2024-12-16 PROCEDURE — G8427 DOCREV CUR MEDS BY ELIG CLIN: HCPCS

## 2024-12-16 PROCEDURE — 3074F SYST BP LT 130 MM HG: CPT

## 2024-12-16 PROCEDURE — 99213 OFFICE O/P EST LOW 20 MIN: CPT

## 2024-12-16 PROCEDURE — 1036F TOBACCO NON-USER: CPT

## 2024-12-16 PROCEDURE — G8484 FLU IMMUNIZE NO ADMIN: HCPCS

## 2024-12-16 PROCEDURE — G8417 CALC BMI ABV UP PARAM F/U: HCPCS

## 2024-12-16 PROCEDURE — 3017F COLORECTAL CA SCREEN DOC REV: CPT

## 2024-12-16 PROCEDURE — 1159F MED LIST DOCD IN RCRD: CPT

## 2024-12-16 PROCEDURE — 1126F AMNT PAIN NOTED NONE PRSNT: CPT

## 2024-12-16 PROCEDURE — 1123F ACP DISCUSS/DSCN MKR DOCD: CPT

## 2024-12-16 PROCEDURE — 3078F DIAST BP <80 MM HG: CPT

## 2024-12-16 PROCEDURE — 87502 INFLUENZA DNA AMP PROBE: CPT

## 2024-12-16 RX ORDER — BENZONATATE 100 MG/1
100 CAPSULE ORAL 3 TIMES DAILY PRN
Qty: 30 CAPSULE | Refills: 0 | Status: SHIPPED | OUTPATIENT
Start: 2024-12-16 | End: 2024-12-26

## 2024-12-16 RX ORDER — DEXTROMETHORPHAN HYDROBROMIDE AND PROMETHAZINE HYDROCHLORIDE 15; 6.25 MG/5ML; MG/5ML
5 SYRUP ORAL 4 TIMES DAILY PRN
Qty: 120 ML | Refills: 0 | Status: SHIPPED | OUTPATIENT
Start: 2024-12-16 | End: 2024-12-23

## 2024-12-16 ASSESSMENT — ENCOUNTER SYMPTOMS
SHORTNESS OF BREATH: 0
COUGH: 1
SORE THROAT: 0
GASTROINTESTINAL NEGATIVE: 1
TROUBLE SWALLOWING: 0
WHEEZING: 0
RHINORRHEA: 1

## 2024-12-16 ASSESSMENT — PATIENT HEALTH QUESTIONNAIRE - PHQ9
SUM OF ALL RESPONSES TO PHQ QUESTIONS 1-9: 0
2. FEELING DOWN, DEPRESSED OR HOPELESS: NOT AT ALL
1. LITTLE INTEREST OR PLEASURE IN DOING THINGS: NOT AT ALL
SUM OF ALL RESPONSES TO PHQ QUESTIONS 1-9: 0
SUM OF ALL RESPONSES TO PHQ QUESTIONS 1-9: 0
SUM OF ALL RESPONSES TO PHQ9 QUESTIONS 1 & 2: 0
SUM OF ALL RESPONSES TO PHQ QUESTIONS 1-9: 0

## 2024-12-16 NOTE — PROGRESS NOTES
David Quevedo is a 69 y.o. male presenting for/with:    Chief Complaint   Patient presents with    Sinus Problem     Cough, Tickle in the back of his throat. Fever \"the other day\" Sweats at the same time. S/S >1 week. Denies N/V/D/C. States he was coughing so much that he threw up last night. (-) COVID test this morning.  Max fever last week 101.       Vitals:    12/16/24 1031   BP: 106/72   Site: Left Upper Arm   Position: Sitting   Cuff Size: Large Adult   Pulse: 66   Resp: 19   Temp: 97.7 °F (36.5 °C)   TempSrc: Temporal   SpO2: 99%   Weight: 99.3 kg (219 lb)   Height: 1.778 m (5' 10\")       Pain Scale: 0 - No pain/10  Pain Location:     \"Have you been to the ER, urgent care clinic since your last visit?  Hospitalized since your last visit?\"    NO    “Have you seen or consulted any other health care providers outside of [facility] since your last visit?”    NO    “Have you had a colorectal cancer screening such as a colonoscopy/FIT/Cologuard?    NO    No colonoscopy on file  No cologuard on file  No FIT/FOBT on file   No flexible sigmoidoscopy on file                  12/16/2024    10:23 AM   PHQ-9    Little interest or pleasure in doing things 0   Feeling down, depressed, or hopeless 0   PHQ-2 Score 0   PHQ-9 Total Score 0           12/16/2024    10:30 AM   [facility] AMB LEARNING ASSESSMENT   Primary Learner Patient   Primary Language ENGLISH   Learning Preference DEMONSTRATION   Answered By self   Relationship to Learner SELF            12/16/2024    10:23 AM   Amb Fall Risk Assessment and TUG Test   Do you feel unsteady or are you worried about falling?  no   2 or more falls in past year? no   Fall with injury in past year? no           12/16/2024    10:00 AM   ADL ASSESSMENT   Feeding yourself No Help Needed   Getting from bed to chair No Help Needed   Getting dressed No Help Needed   Bathing or showering No Help Needed   Walk across the room (includes cane/walker) No Help Needed   Using the 
(from the past 24 hour(s))   AMB POC INFLUENZA A  AND B REAL-TIME RT-PCR    Collection Time: 12/16/24 10:35 AM   Result Value Ref Range    Valid Internal Control, POC Pass     Influenza A Antigen, POC Negative Not Detected    Influenza B Antigen, POC Negative Not Detected    Continue symptomatic management of symptoms; APAP per package instructions for discomfort, antitussives for cough, push fluids and rest.  Orders:    AMB POC INFLUENZA A  AND B REAL-TIME RT-PCR    benzonatate (TESSALON) 100 MG capsule; Take 1 capsule by mouth 3 times daily as needed for Cough    promethazine-dextromethorphan (PROMETHAZINE-DM) 6.25-15 MG/5ML syrup; Take 5 mLs by mouth 4 times daily as needed for Cough         Medication Side Effects and Warnings were discussed with patient: Yes  Patient Labs were reviewed:  Yes  Patient Past Records were reviewed:  Yes    Patient aware of plan of care and verbalized understanding. Questions answered. RTC PRN or sooner if needed.    On this date 12/16/2024 I have spent 20 minutes reviewing previous notes, test results and face to face with the patient discussing the diagnosis and importance of compliance with the treatment plan as well as documenting on the day of the visit.    MARINA Welch - NP

## 2025-03-24 ENCOUNTER — TRANSCRIBE ORDERS (OUTPATIENT)
Facility: HOSPITAL | Age: 70
End: 2025-03-24

## 2025-03-24 DIAGNOSIS — I72.4 ANEURYSM OF ARTERY OF LOWER EXTREMITY: Primary | ICD-10-CM

## 2025-03-28 ENCOUNTER — OFFICE VISIT (OUTPATIENT)
Age: 70
End: 2025-03-28

## 2025-03-28 VITALS
SYSTOLIC BLOOD PRESSURE: 120 MMHG | RESPIRATION RATE: 17 BRPM | WEIGHT: 221.8 LBS | HEIGHT: 70 IN | OXYGEN SATURATION: 97 % | HEART RATE: 68 BPM | BODY MASS INDEX: 31.75 KG/M2 | TEMPERATURE: 97.8 F | DIASTOLIC BLOOD PRESSURE: 70 MMHG

## 2025-03-28 DIAGNOSIS — J30.1 SEASONAL ALLERGIC RHINITIS DUE TO POLLEN: Primary | ICD-10-CM

## 2025-03-28 RX ORDER — DEXAMETHASONE SODIUM PHOSPHATE 4 MG/ML
4 INJECTION, SOLUTION INTRA-ARTICULAR; INTRALESIONAL; INTRAMUSCULAR; INTRAVENOUS; SOFT TISSUE ONCE
Status: COMPLETED | OUTPATIENT
Start: 2025-03-28 | End: 2025-03-28

## 2025-03-28 RX ORDER — METHYLPREDNISOLONE ACETATE 40 MG/ML
40 INJECTION, SUSPENSION INTRA-ARTICULAR; INTRALESIONAL; INTRAMUSCULAR; SOFT TISSUE ONCE
Status: COMPLETED | OUTPATIENT
Start: 2025-03-28 | End: 2025-03-28

## 2025-03-28 RX ADMIN — METHYLPREDNISOLONE ACETATE 40 MG: 40 INJECTION, SUSPENSION INTRA-ARTICULAR; INTRALESIONAL; INTRAMUSCULAR; SOFT TISSUE at 14:37

## 2025-03-28 RX ADMIN — DEXAMETHASONE SODIUM PHOSPHATE 4 MG: 4 INJECTION, SOLUTION INTRA-ARTICULAR; INTRALESIONAL; INTRAMUSCULAR; INTRAVENOUS; SOFT TISSUE at 14:37

## 2025-03-28 ASSESSMENT — PATIENT HEALTH QUESTIONNAIRE - PHQ9
SUM OF ALL RESPONSES TO PHQ QUESTIONS 1-9: 0
SUM OF ALL RESPONSES TO PHQ QUESTIONS 1-9: 0
2. FEELING DOWN, DEPRESSED OR HOPELESS: NOT AT ALL
SUM OF ALL RESPONSES TO PHQ QUESTIONS 1-9: 0
1. LITTLE INTEREST OR PLEASURE IN DOING THINGS: NOT AT ALL
SUM OF ALL RESPONSES TO PHQ QUESTIONS 1-9: 0

## 2025-03-28 NOTE — PROGRESS NOTES
Chief Complaint   Patient presents with    Ear Pain     Runny nose,R & L ear pain       ASSESSMENT AND PLAN:       1. Seasonal allergic rhinitis due to pollen    - methylPREDNISolone acetate (DEPO-MEDROL) injection 40 mg  - dexAMETHasone (DECADRON) injection 4 mg     Recommend a daily Zyrtec, avoidance of triggers, and a steroid injection in the office. He will call Monday if no improvement.     Patient aware of plan of care and verbalized understanding. Questions answered. RTC 1 month for his AWV and check up (Prevnar), or sooner if needed.    HPI:     is a 69 y.o. male in today for sore throat, throat swelling, ear pain and soreness. Symptoms started this weekend. He notes a fever at the beginning of the weak. Runny nose.     Allergies   Allergen Reactions    Tetanus-Diphtheria Toxoids Td Hives     Other reaction(s): red spot at injection site    Penicillins Other (See Comments)       Prior to Visit Medications    Medication Sig Taking? Authorizing Provider   furosemide (LASIX) 20 MG tablet Take 1 tablet by mouth nightly  Yulia Ponce MD   metFORMIN (GLUCOPHAGE) 500 MG tablet Take 1 tablet by mouth nightly  Yulia Ponce MD   nebivolol (BYSTOLIC) 5 MG tablet Take 1 tablet by mouth nightly  Yulia Ponce MD   prasugrel (EFFIENT) 10 MG TABS Take 1 tablet by mouth nightly  Yulia Ponce MD   ranolazine (RANEXA) 1000 MG extended release tablet Take 2 tablets by mouth nightly  Yulia Ponce MD   colchicine (COLCRYS) 0.6 MG tablet Take 1 tablet by mouth daily  Patient taking differently: Take 1 tablet by mouth nightly  Ester Hatfield APRN - NP   clotrimazole-betamethasone (LOTRISONE) 1-0.05 % cream APPLY CREAM TOPICALLY TO AFFECTED AREA AS NEEDED  Ester Hatfield APRN - NP   acetaminophen (TYLENOL) 500 MG tablet Take 2 tablets by mouth as needed  Yulia Ponce MD   aspirin 81 MG EC tablet Take by mouth daily  Yulia Ponce MD   Cholecalciferol 50

## 2025-07-10 ENCOUNTER — HOSPITAL ENCOUNTER (OUTPATIENT)
Facility: HOSPITAL | Age: 70
Discharge: HOME | End: 2025-07-13
Attending: RADIOLOGY
Payer: MEDICARE

## 2025-07-10 DIAGNOSIS — I72.4 ANEURYSM OF ARTERY OF LOWER EXTREMITY: ICD-10-CM

## 2025-07-10 LAB — CREAT BLD-MCNC: 1 MG/DL (ref 0.6–1.3)

## 2025-07-10 PROCEDURE — 6360000004 HC RX CONTRAST MEDICATION: Performed by: RADIOLOGY

## 2025-07-10 PROCEDURE — 82565 ASSAY OF CREATININE: CPT

## 2025-07-10 PROCEDURE — 75635 CT ANGIO ABDOMINAL ARTERIES: CPT

## 2025-07-10 RX ORDER — IOPAMIDOL 755 MG/ML
100 INJECTION, SOLUTION INTRAVASCULAR
Status: COMPLETED | OUTPATIENT
Start: 2025-07-10 | End: 2025-07-10

## 2025-07-10 RX ADMIN — IOPAMIDOL 100 ML: 755 INJECTION, SOLUTION INTRAVENOUS at 12:52

## (undated) DEVICE — BLADE ASSEMB CLP HAIR FINE --

## (undated) DEVICE — TR BAND RADIAL ARTERY COMPRESSION DEVICE: Brand: TR BAND

## (undated) DEVICE — TREK CORONARY DILATATION CATHETER 3.50 MM X 8 MM / RAPID-EXCHANGE: Brand: TREK

## (undated) DEVICE — Device: Brand: CONFIANZA PRO 12

## (undated) DEVICE — DECANTER FLD L85IN IV FLX TBNG CLMP DLP

## (undated) DEVICE — Device: Brand: FIELDER FC

## (undated) DEVICE — CUSTOM KT PTCA INFL DEV K05 00053H

## (undated) DEVICE — ANGIO-SEAL VIP VASCULAR CLOSURE DEVICE: Brand: ANGIO-SEAL

## (undated) DEVICE — ANGIOGRAPHY KIT CUST [K0910930B] [MERIT MEDICAL SYSTEMS INC]

## (undated) DEVICE — GLIDESHEATH SLENDER ACCESS KIT: Brand: GLIDESHEATH SLENDER

## (undated) DEVICE — CATH GUID .062IN 7FR 150CM -- GUIDELINER V3

## (undated) DEVICE — PACK PROCEDURE SURG HRT CATH

## (undated) DEVICE — TOWEL,OR,DSP,ST,BLUE,STD,2/PK,40PK/CS: Brand: MEDLINE

## (undated) DEVICE — CATHETER ANGIO JR4 AD 5 FRX100 CM 25 CM PERFORMA

## (undated) DEVICE — CATHETER GUID 7FR L100CM DIA0.081IN NYL SHFT AL1.0 W/ SIDE

## (undated) DEVICE — SYR ART 700 CLEAR MARK 7 -- ARTERION

## (undated) DEVICE — RADIFOCUS OPTITORQUE ANGIOGRAPHIC CATHETER: Brand: OPTITORQUE

## (undated) DEVICE — HI-TORQUE PILOT 200 GUIDE WIRE .014 STRAIGHT TIP 3.0 CM X 190 CM: Brand: HI-TORQUE PILOT

## (undated) DEVICE — Device: Brand: FIELDER XT

## (undated) DEVICE — CATH GUID COR EB40 6FR 100CM -- LAUNCHER

## (undated) DEVICE — TREK CORONARY DILATATION CATHETER 3.0 MM X 30 MM / RAPID-EXCHANGE: Brand: TREK

## (undated) DEVICE — CATHETER GUID 5FR L100CM DIA0.058IN NYL SHFT EBU3.75 W/O

## (undated) DEVICE — SHIELD RAD ANGIO 16X14 IN RADIAL FEN STRL RADPAD

## (undated) DEVICE — TREK CORONARY DILATATION CATHETER 3.0 MM X 12 MM / RAPID-EXCHANGE: Brand: TREK

## (undated) DEVICE — VALVE INSRT TOOL ADPT MTL 9FR -- ACCESS

## (undated) DEVICE — PINNACLE PRECISION ACCESS SYSTEM INTRODUCER SHEATH: Brand: PINNACLE PRECISION ACCESS SYSTEM

## (undated) DEVICE — PRESSURE GUIDEWIRE: Brand: COMET

## (undated) DEVICE — CATH TRAPPER EXCHANGE --

## (undated) DEVICE — Device: Brand: CORSAIR PRO

## (undated) DEVICE — Device: Brand: ASAHI SION

## (undated) DEVICE — DIGIT TRAP FINGER GRASPING DEVICE: Brand: DIGIT TRAP

## (undated) DEVICE — TREK CORONARY DILATATION CATHETER 2.50 MM X 30 MM / RAPID-EXCHANGE: Brand: TREK

## (undated) DEVICE — AIRLIFE™  ADULT CUSHION NASAL CANNULA WITH 7 FOOT (2.1 M) CRUSH-RESISTANT OXYGEN TUBING, AND U/CONNECT-IT ADAPTER: Brand: AIRLIFE™

## (undated) DEVICE — Device: Brand: ASAHI SION BLUE

## (undated) DEVICE — TREK CORONARY DILATATION CATHETER 2.75 MM X 15 MM / RAPID-EXCHANGE: Brand: TREK

## (undated) DEVICE — COPILOT BLEEDBACK CONTROL VALVE: Brand: COPILOT

## (undated) DEVICE — DRAPE PRB US TRNSDCR 6X96IN --

## (undated) DEVICE — 3M™ TEGADERM™ TRANSPARENT FILM DRESSING FRAME STYLE, 1626W, 4 IN X 4-3/4 IN (10 CM X 12 CM), 50/CT 4CT/CASE: Brand: 3M™ TEGADERM™

## (undated) DEVICE — SYR POWER 150ML 8IN FILL TUBE --

## (undated) DEVICE — GUIDEWIRE VASC L145CM 0.035IN J TIP L3MM PTFE FIX COR NAMIC

## (undated) DEVICE — SYRINGE ANGIO 10ML RED FLAT GRP FIX M LUER CONN MEDALLION

## (undated) DEVICE — DRESSING HEMOSTATIC SFT INTVENT W/O SLT DBL WRP QUIKCLOT LF

## (undated) DEVICE — DRAPE OPHTH 4 PLY SGL FEN